# Patient Record
Sex: MALE | Race: WHITE | NOT HISPANIC OR LATINO | Employment: OTHER | URBAN - METROPOLITAN AREA
[De-identification: names, ages, dates, MRNs, and addresses within clinical notes are randomized per-mention and may not be internally consistent; named-entity substitution may affect disease eponyms.]

---

## 2020-09-16 ENCOUNTER — OFFICE VISIT CONVERTED (OUTPATIENT)
Dept: NEUROLOGY | Facility: CLINIC | Age: 70
End: 2020-09-16
Attending: PSYCHIATRY & NEUROLOGY

## 2021-05-10 NOTE — H&P
History and Physical      Patient Name: CASSANDRA ARRINGTON   Patient ID: [id number]   Sex: Male   YOB: 1950    Referring Provider: Asa Marsh MD    Visit Date: September 16, 2020    Provider: Remy Barth MD   Location: [facility]   Location Address: [address] [place] [address]  [id number]   Location Phone: [phone]          Chief Complaint     New pt here for Parkinson's disease.       History Of Present Illness  CASSANDRA ARRINGTON is a 70 year old male who presents today to [facility] today referred from Asa Marsh MD.      70-year-old man evaluated for Parkinson's disease but [place].  He states that a neurologist at that time gave him Neupro patch which given dyskinetic movements.  He states that he came to [place] about 6 years ago with his wife and he saw a neurologist in [place] back then who gave him carbidopa/levodopa and he tried it for a week at a time but it gave him dyskinetic movements.  He tried it a couple of times more and he had side effects and he refuses to take it at this time.  He has also been taking pramipexole 0.5 mg and he supposed to take it 3 times a day but he is only taking it once a day.  He is also taking amantadine 100 mg twice a day which he only takes once a day.  His wife thinks that he has memory problems however he takes care of his own medications.    His wife helps him with all activities of daily living because he has had 2 hip replacement and had 3 spine surgeries.  He needs assistance for bathing, dressing.  He needs at times assistance getting up from a sitting to standing position.  He was using a cane before 1-1/2 to 2 years ago.  Since that time is been using a walker.  He has fallen down a few times sideways and backwards.  He states that 6 years ago he was able to walk 1 mile a day.    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  He has not driven for 2 years.  He has problems with sleep.  His wife states that he goes into his study and reads in the evenings.  He drinks coffee in the morning and Pepsi during the daytime.  He does not watch his caffeine intake.  He takes melatonin at night.  He wants to know if there is any other stronger medications that he can take.    He has no history of REM behavior disorder.  He does not give any symptoms suggestive of REM behavior disorder at this time as well.  His wife states that he has significant amount of drooling.       Medication List  amantadine HCl 100 mg oral capsule; aspirin 81 mg oral tablet,delayed release (DR/EC); atorvastatin 80 mg oral tablet; buspirone 5 mg oral tablet; Bystolic 5 mg oral tablet; ciprofloxacin 500 mg/5 mL oral suspension,microcapsule recon; clopidogrel 75 mg oral tablet; diltiazem HCl 360 mg oral capsule,extended release 24 hr; furosemide 20 mg oral tablet; glycopyrrol 2 mg oral; isosorbide mononitrate 30 mg oral tablet extended release 24 hr; nitroglycerin 0.4 mg sublingual tablet, sublingual; pramipexole 0.5 mg oral tablet; tamsulosin 0.4 mg oral capsule; tramadol 50 mg oral tablet         Allergy List  Codiene         Social History  Alcohol (Never); Tobacco (Never)         Review of Systems  · Constitutional  o Admits  o : fatigue, weight loss  o Denies  o : chills, excessive sweating, fever, sycope/passing out, weight gain  · Eyes  o Admits  o : blurry vision  o Denies  o : changes in vision, double vision  · HENT  o Admits  o : nasal congestion  o Denies  o : loss of hearing, ringing in the ears, ear aches, sore throat, sinus pain, nose bleeds, seasonal allergies  · Cardiovascular  o Admits  o : easy burising or bleeding  o Denies  o : blood clots, swollen legs, anemia, transfusions  · Respiratory  o Admits  o : shortness of breath  o Denies  o : dry cough, productive cough, pneumonia, COPD  · Gastrointestinal  o Admits  o : difficulty  "swallowing  o Denies  o : reflux  · Genitourinary  o Denies  o : incontinence, erectile dysfunction  · Neurologic  o Admits  o : loss of balance, falls, dizziness/vertigo, difficulty with sleep, difficulty with coordination  o Denies  o : headache, seizure, stroke, tremor, numbness/tingling/paresthesia , difficulty with dexterity, weakness  · Musculoskeletal  o Admits  o : weakness, low back pain  o Denies  o : neck stiffness/pain, swollen lymph nodes, muscle aches, joint pain, spasms, sciatica, pain radiating in arm, pain radiating in leg  · Endocrine  o Admits  o : diabetes  o Denies  o : thyroid disorder  · Psychiatric  o Denies  o : anxiety, depression      Vitals  Date Time BP Position Site L\R Cuff Size HR RR TEMP (F) WT  HT  BMI kg/m2 BSA m2 O2 Sat HC       09/16/2020 03:08 PM        97.6         09/16/2020 03:32 /45 Sitting    75 - R   200lbs 0oz 5'  7\" 31.32 2.07           Physical Examination     He is alert, fluent, phasic, follows commands well.  His Mini-Mental status score is 26 out of 30.  He missed 3 out of 3 recent memory and he missed the date.  Clock drawing is intact.  He can tell me the president [place] at the previous president.  Optic disks are normal bilaterally, visual fields are full, EOMs are limited on upward gaze.  He cannot look upwards.  Facial strength is full, soft palate elevation and tongue are normal.  There is a mild tremor noted in the left hand at rest.  There is no significant rigidity or cogwheeling.  There is no weakness individual muscle testing.  Reflexes are absent.  Station and gait he has difficulty getting up from a chair and needed to be pushed up with the help of his wife.  He has start hesitation.  He takes slow and small shuffling steps.  Once going is able to ambulate but he has freezing when he stops.  Turning is significantly difficult for him with small steps.  Heart is regular in rhythm normal in rate.           Assessment  · Dementia associated " with Parkinson's disease       Parkinson's disease     331.82/G20  Dementia in other diseases classified elsewhere without behavioral disturbance     331.82/F02.80  I discussed with his wife that he needs assistance for his medications and his wife should be the one to make sure that he is taking his medications properly. In regards to the sleep problems I told the wife that he cannot take caffeine after 12 noon. He should not take caffeinated drinks such as Pepsi. Once he is ready to sleep he should just get ready and not stimulate himself and then go to bed at a designated time and keep that schedule.  · Parkinson's Disease     332.0/G20  I discussed with him and his wife the difference between Parkinson's disease and Parkinson syndrome. It is possible he has supranuclear palsy. I also told him that we cannot tell the difference at this time since is not taking any medications. I told him that pramipexole is 0.5 mg is ineffective and so is amounted in the 100 mg daily. He refuses to take carbidopa/levodopa. I will increase pramipexole to take 0.5 mg 3 times a day at 6 AM, 12 noon and 6 PM for 1 week and then 1 mg 3 times a day the second week if there is no improvement of symptoms and then 1.5 mg 3 times a day the third week and on. I told him that side effects of higher doses of pramipexole includes confusion, hallucination, dyskinetic movements. They are to call our office for any problems with the medications. He is to take amantadine 100 mg twice a day. I told him and his wife that amantadine can also cause hallucinations and confusion.    I will refer him to the [facility] movement disorder clinic to be evaluated for Duopa pump as well as possible DBS. I do not think he is a candidate for DBS however. I will see him again in 2 months time for follow-up.    60 minutes was spent for this high complexity encounter more than half the time was spent face-to-face with the patient for examination,  counseling, planning and recommendations.    Problems Reconciled  Plan  · Orders  o NEUROLOGY CONSULTATION. (NEURO) - 332.0/G20, 331.82/G20, 331.82/F02.80 - 09/17/2020   [facility] movement disorder clinic for duopa pump for DBS evaluation  · Medications  o Medications have been Reconciled  o Transition of Care or Provider Policy  · Instructions  o Encouraged to follow-up with Primary Care Provider for preventative care.            Electronically Signed by: Remy Barth MD -Author on September 17, 2020 08:22:34 AM

## 2021-05-12 ENCOUNTER — CONVERSION ENCOUNTER (OUTPATIENT)
Dept: CARDIOLOGY | Facility: CLINIC | Age: 71
End: 2021-05-12

## 2021-05-12 ENCOUNTER — OFFICE VISIT CONVERTED (OUTPATIENT)
Dept: CARDIOLOGY | Facility: CLINIC | Age: 71
End: 2021-05-12
Attending: INTERNAL MEDICINE

## 2021-05-14 VITALS
HEIGHT: 67 IN | SYSTOLIC BLOOD PRESSURE: 125 MMHG | HEART RATE: 75 BPM | DIASTOLIC BLOOD PRESSURE: 45 MMHG | BODY MASS INDEX: 31.39 KG/M2 | WEIGHT: 200 LBS | TEMPERATURE: 97.6 F

## 2021-05-23 ENCOUNTER — TRANSCRIBE ORDERS (OUTPATIENT)
Dept: CARDIOLOGY | Facility: CLINIC | Age: 71
End: 2021-05-23

## 2021-05-23 DIAGNOSIS — Z95.828 STATUS POST VASCULAR BYPASS: Primary | ICD-10-CM

## 2021-06-05 NOTE — H&P
History and Physical      Patient Name: Imtiaz Cabezas   Patient ID: [id number]   Sex: Male   YOB: 1950    Referring Provider: José Luis SPANN    Visit Date: May 12, 2021    Provider: Simone Ross MD   Location: [facility]   Location Address: [address] [place] [address], [address]   [place]  [id number]   Location Phone: [phone]          Chief Complaint     Shortness of breath, evaluate his coronary artery disease, and establish cardiology.       History Of Present Illness  Consult requested by: José Luis SPANN   Imtiaz Cabezas is a 70 year old male who has seen a cardiologist in [place] long term but as he gets older he wants one closer to home, he does not want to be driving back and forth to [place] so he comes here to establish a cardiologist. He complains of shortness of breath, particularly with activities, mild with moderate exertion and also when he gets excited. That is long term and attributed to his COPD. He has some swelling which is mild and long term for him. He denies any chest pain, no palpitations. He has some dizziness that he attributes to his Parkinson's but no syncope, PND, or orthopnea.   PAST MEDICAL HISTORY: Positive for diabetes, hypertension, prostate cancer, sleep apnea, congestive heart failure, arthritis, previous MI and circulation issues. Hospitalizations/surgeries include a bypass x3 in January 2012 at [place] (LIMA to the left anterior descending, SVG to right coronary artery, SVG to OM), a stent to the right coronary artery in January 2016. He had ICD implant for V-tach he says was last checked about a week ago.   FAMILY HISTORY: Positive for hypertension and heart disease. Negative for diabetes. His mom had rheumatic fever and had mitral valve issues from a young age.   CURRENT MEDICATIONS: Based on list and some bottles. Amantadine 100 mg twice daily; Nitroglycerin 0.4 mg as needed; Hydroxyzine HCL 10 mg as needed; Mirtazapine 30 mg daily as  "needed; Tramadol HCL 50 mg three times daily as needed; Sinemet twice daily; Flomax 0.4 mg 2 daily; Bystolic 5 mg daily; Glycopyrrolate 2 mg 1/2 daily as needed; Pramipexole 0.25 mg twice daily; Buspirone 5 mg three times daily; Furosemide 20 mg daily; Vitamin D3 125 mcg daily; Diltiazem 24-HR  mg; Clopidogrel 75 mg daily. He admits that he has stopped taking his atorvastatin for no particular reason and he has also stopped taking his aspirin.   CHOLESTEROL STATUS: Uncertain.   PSYCHOSOCIAL HISTORY: Denies alcohol or tobacco use.   ALLERGIES: Codeine.       Review of Systems  · Constitutional  o Admits  o : fatigue, good general health lately, recent weight changes   · Eyes  o Denies  o : double vision  · HENT  o Admits  o : chronic sinus problem  o Denies  o : hearing loss or ringing, swollen glands in neck  · Cardiovascular  o Admits  o : swelling (feet, ankles, hands), shortness of breath while walking or lying flat  o Denies  o : chest pain, palpitations (fast, fluttering, or skipping beats)  · Respiratory  o Denies  o : shortness of breath, asthma or wheezing, COPD  · Gastrointestinal  o Denies  o : ulcers, nausea or vomiting  · Neurologic  o Admits  o : lightheaded or dizzy, stroke  o Denies  o : headaches  · Musculoskeletal  o Admits  o : joint pain, back pain  · Endocrine  o Admits  o : diabetes  o Denies  o : thyroid disease, heat or cold intolerance, excessive thirst or urination  · Heme-Lymph  o Admits  o : bleeding or bruising tendency  o Denies  o : anemia      Vitals  Date Time BP Position Site L\R Cuff Size HR RR TEMP (F) WT  HT  BMI kg/m2 BSA m2 O2 Sat FR L/min FiO2        05/12/2021 02:13 /64 Sitting    76 - R   194lbs 16oz 5'  7\" 30.54 2.04             Physical Examination  · Constitutional  o Appearance  o : Awake, alert, in no acute distress. He ambulates with a rolling walker. He is accompanied by his wife.   · Eyes  o Conjunctivae  o : Conjunctivae normal.  o Pupils and " Irises  o : Pupils equal and reactive to light.  · Ears, Nose, Mouth and Throat  o Oral Cavity  o :   § Oral Mucosa  § : Normal oral mucosa.  · Neck  o Inspection/Palpation/Auscultation  o : No lymphadenopathy. No JVD. No bruit. Good carotid upstroke.  · Respiratory  o Respiratory  o : Clear to percussion and auscultation. Good respiratory effort.  · Cardiovascular  o Heart  o : PMI is slightly displaced. Heart sounds are distant. S1 and S2 are regular. No S3 and no S4. He has a faint murmur at the base, negative diastolic murmur.   o Peripheral Vascular System  o :   § Femoral Arteries  § : Good femoral pulses.  § Pedal Pulses  § : Good pedal pulses. No pedal edema.  · Gastrointestinal  o Abdominal Examination  o : Soft. No masses or tenderness noted. No hepatosplenomegaly. Abdominal aorta not palpable.  · Musculoskeletal  o General  o : Muscle strength is normal with normal tone.  · Skin and Subcutaneous Tissue  o General Inspection  o : Within normal limits.  o Digits and Nails  o : Nails are normal.  · EKG  o EKG  o : Was performed in the office today  o Indications  o : Coronary artery disease.  o Results  o : Sinus rhythm wtih a rate of 75 with a left bundle branch block.  o Comparison  o : No other EKGs to compare with.  · Labs  o Labs  o : Cholesterol taken recently by primary care provider showed total cholesterol 186, triglycerides 115, HDL is 52, LDL is at 113.           Assessment     1.  Shortness of breath is stable.  2.  Coronary artery disease, previous bypass, stents, and MI without angina.  3.  Hyperlipidemia, needs tighter control, would like LDL below 70.  4.  Hypertension, controlled.   5.  Presence of an ICD device.   6.  Diabetes mellitus, insulin dependent without an ACE or ARB.          Plan     1. He has issues with some swelling at times as well as constipation. We are going to stop his diltiazem.  2. Increase Bystolic to 10 mg a day for rate control and his blood pressure.  3.  Start  losartan 25 mg half a tab in the morning for 6 days and then 1 tab for both secondary prevention with diabetes as well as for hypertension.  4.  Start aspirin 81 every other day because he complains of bruising at times.   5.  Start atorvastatin 40 mg once a day for his hyperlipidemia.   6.  Will followup in 4 months with an echocardiogram. Will check lipids and CMP at that time.   7.  Will check a BMP in 1 month since starting the losartan.   8.  We will try to get his ICD records and start checking it here and moving his remove readings down to us.   9.  We discussed the risks versus benefits of COVID vaccine and I strongly encouraged him to get it down.         AGUSTO Thakkar/iSmone Ross MD, [facility]  JF:PM:vh             Electronically Signed by: Amina Sanchez-, OT -Author on May 15, 2021 07:39:48 AM  Electronically Co-signed by: AGUSTO Ackerman -Reviewer on May 17, 2021 07:32:21 AM  Electronically Co-signed by: Simone Ross MD -Reviewer on May 22, 2021 04:46:03 PM

## 2021-06-16 ENCOUNTER — TELEPHONE (OUTPATIENT)
Dept: CARDIOLOGY | Facility: CLINIC | Age: 71
End: 2021-06-16

## 2021-06-16 NOTE — TELEPHONE ENCOUNTER
I accepted patients home remote follow up and I received an alert that his Right Ventricular lead has noise on it and lead impedence is 2725 ohms.  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

## 2021-07-15 VITALS
HEART RATE: 76 BPM | SYSTOLIC BLOOD PRESSURE: 134 MMHG | WEIGHT: 195 LBS | DIASTOLIC BLOOD PRESSURE: 64 MMHG | BODY MASS INDEX: 30.61 KG/M2 | HEIGHT: 67 IN

## 2021-09-03 PROBLEM — I25.810 CORONARY ARTERY DISEASE INVOLVING CORONARY BYPASS GRAFT OF NATIVE HEART WITHOUT ANGINA PECTORIS: Status: ACTIVE | Noted: 2021-09-03

## 2021-09-03 PROBLEM — Z95.810 PRESENCE OF COMBINATION INTERNAL CARDIAC DEFIBRILLATOR (ICD) AND PACEMAKER: Chronic | Status: ACTIVE | Noted: 2021-09-03

## 2021-09-03 PROBLEM — E78.5 HYPERLIPIDEMIA: Chronic | Status: ACTIVE | Noted: 2021-09-03

## 2021-09-03 PROBLEM — E78.5 HYPERLIPIDEMIA: Status: ACTIVE | Noted: 2021-09-03

## 2021-09-03 PROBLEM — I10 HIGH BLOOD PRESSURE: Chronic | Status: ACTIVE | Noted: 2021-09-03

## 2021-09-03 PROBLEM — I10 HIGH BLOOD PRESSURE: Status: ACTIVE | Noted: 2021-09-03

## 2021-09-08 ENCOUNTER — CLINICAL SUPPORT NO REQUIREMENTS (OUTPATIENT)
Dept: CARDIOLOGY | Facility: CLINIC | Age: 71
End: 2021-09-08

## 2021-09-08 ENCOUNTER — OFFICE VISIT (OUTPATIENT)
Dept: CARDIOLOGY | Facility: CLINIC | Age: 71
End: 2021-09-08

## 2021-09-08 VITALS
SYSTOLIC BLOOD PRESSURE: 146 MMHG | DIASTOLIC BLOOD PRESSURE: 64 MMHG | HEART RATE: 76 BPM | WEIGHT: 190 LBS | HEIGHT: 67 IN | BODY MASS INDEX: 29.82 KG/M2

## 2021-09-08 DIAGNOSIS — I25.810 CORONARY ARTERY DISEASE INVOLVING CORONARY BYPASS GRAFT OF NATIVE HEART WITHOUT ANGINA PECTORIS: Chronic | ICD-10-CM

## 2021-09-08 DIAGNOSIS — I10 ESSENTIAL HYPERTENSION: Chronic | ICD-10-CM

## 2021-09-08 DIAGNOSIS — I50.42 CHRONIC COMBINED SYSTOLIC AND DIASTOLIC CONGESTIVE HEART FAILURE (HCC): ICD-10-CM

## 2021-09-08 DIAGNOSIS — I25.810 CORONARY ARTERY DISEASE INVOLVING CORONARY BYPASS GRAFT OF NATIVE HEART WITHOUT ANGINA PECTORIS: Primary | Chronic | ICD-10-CM

## 2021-09-08 DIAGNOSIS — Z95.810 PRESENCE OF COMBINATION INTERNAL CARDIAC DEFIBRILLATOR (ICD) AND PACEMAKER: Chronic | ICD-10-CM

## 2021-09-08 DIAGNOSIS — E78.5 HYPERLIPIDEMIA, UNSPECIFIED HYPERLIPIDEMIA TYPE: Chronic | ICD-10-CM

## 2021-09-08 DIAGNOSIS — E78.5 HYPERLIPIDEMIA, UNSPECIFIED HYPERLIPIDEMIA TYPE: ICD-10-CM

## 2021-09-08 DIAGNOSIS — Z95.810 PRESENCE OF COMBINATION INTERNAL CARDIAC DEFIBRILLATOR (ICD) AND PACEMAKER: Primary | ICD-10-CM

## 2021-09-08 PROCEDURE — 93283 PRGRMG EVAL IMPLANTABLE DFB: CPT | Performed by: INTERNAL MEDICINE

## 2021-09-08 PROCEDURE — 99214 OFFICE O/P EST MOD 30 MIN: CPT | Performed by: INTERNAL MEDICINE

## 2021-09-08 NOTE — PROGRESS NOTES
Chief Complaint  Hypertension and Follow-up    Subjective            Imtiaz Cabezas presents to [facility]  Mr. Cabezas is a 71 years old gentleman with coronary disease previous bypass surgery hypertension hyperlipidemia who is doing better. He denies any definite chest pain palpitations dizziness syncope. His device check reveals that his RV lead is not working with increasing impedance. He did not bring his medication bottles and does not know his medications      Past Medical History:   Diagnosis Date   • Arthritis    • Coronary artery disease involving coronary bypass graft of native heart without angina pectoris 1/1/2016     bypass x3 in January 2012 at [place] (LIMA to the left anterior descending, SVG to right coronary artery, SVG to OM), a stent to the right coronary artery in January 2016   • Diabetes (CMS/[facility])    • Heart attack (CMS/[facility])    • High blood pressure    • Parkinson disease (CMS/[facility])        Allergies   Allergen Reactions   • Codeine Anaphylaxis        Past Surgical History:   Procedure Laterality Date   • ARTERIAL BYPASS SURGERY     • BACK SURGERY     • CARDIAC SURGERY     • CAROTID STENT     • HIP SURGERY     • INSERT / REPLACE / REMOVE PACEMAKER     • PACEMAKER IMPLANTATION          Social History     Tobacco Use   • Smoking status: Never Smoker   • Smokeless tobacco: Never Used   Vaping Use   • Vaping Use: Never used   Substance Use Topics   • Alcohol use: Never   • Drug use: Never       Family History   Problem Relation Age of Onset   • Parkinsonism Other    • Diabetes Other    • Other Mother         mitral valve replacement        Prior to Admission medications    Not on File        Review of Systems   Constitutional: Positive for fatigue.   Respiratory: Negative for cough and shortness of breath.    Cardiovascular: Negative for chest pain, palpitations and leg swelling.   Neurological: Positive for dizziness.        Objective     /64   Pulse 76   Ht  "170 cm (66.93\")   Wt 86.2 kg (190 lb)   BMI 29.82 kg/m²       Physical Exam  Constitutional:       General: He is awake.      Appearance: Normal appearance.   Neck:      Thyroid: No thyromegaly.      Vascular: No carotid bruit or JVD.   Cardiovascular:      Rate and Rhythm: Normal rate and regular rhythm.      Chest Wall: PMI is not displaced.      Pulses: Normal pulses.      Heart sounds: S1 normal and S2 normal. Murmur heard.   Systolic murmur is present.   No friction rub. No gallop. No S3 or S4 sounds.    Pulmonary:      Effort: Pulmonary effort is normal.      Breath sounds: Normal breath sounds and air entry. No wheezing, rhonchi or rales.   Abdominal:      General: Bowel sounds are normal.      Palpations: Abdomen is soft. There is no mass.      Tenderness: There is no abdominal tenderness.   Musculoskeletal:      Cervical back: Neck supple.      Right lower leg: No edema.      Left lower leg: No edema.   Neurological:      Mental Status: He is alert and oriented to person, place, and time.   Psychiatric:         Mood and Affect: Mood normal.         Behavior: Behavior is cooperative.           Result Review :                      Outside lab including CBC chemistry lipid panel was reviewed     Assessment and Plan        Diagnoses and all orders for this visit:    1. Coronary artery disease involving coronary bypass graft of native heart without angina pectoris (Primary)  Assessment & Plan:  Coronary artery disease is terrible has not had any angina pectoris. He had bypass x3 in January 2012 at [facility] with LIMA to the LAD, SVG to her right coronary artery and SVG to obtuse marginal branch and a subsequent stent to the right coronary artery in January 2016. He had a ICD placed in February or March of this year for an episode of V. tach in [place]    Orders:  -     Lipid Panel; Future  -     Comprehensive Metabolic Panel; Future  -     Magnesium; Future  -     TSH; Future  -     proBNP; " Future    2. Hyperlipidemia, unspecified hyperlipidemia type  Assessment & Plan:  Lipid abnormalities are at goal.  HDL 59 LDL 52 triglycerides 67    Orders:  -     Lipid Panel; Future  -     Comprehensive Metabolic Panel; Future  -     Magnesium; Future  -     TSH; Future  -     proBNP; Future    3. Essential hypertension  -     Lipid Panel; Future  -     Comprehensive Metabolic Panel; Future  -     Magnesium; Future  -     TSH; Future  -     proBNP; Future    4. Presence of combination internal cardiac defibrillator (ICD) and pacemaker  Assessment & Plan:  Patient's ICD is functioning okay.  His RV lead impedance is gone up.  He has not had any ventricular tachycardia episodes.    Approximate battery life is 5 to 6 years.  Patient is being recommended to visit his electrophysiology in [place] again to see if the RV lead needs to be repositioned or changed    Orders:  -     Lipid Panel; Future  -     Comprehensive Metabolic Panel; Future  -     Magnesium; Future  -     TSH; Future  -     proBNP; Future    5. Chronic combined systolic and diastolic congestive heart failure (CMS/HCC)  -     Lipid Panel; Future  -     Comprehensive Metabolic Panel; Future  -     Magnesium; Future  -     TSH; Future  -     proBNP; Future    He has been recommended to continue his Bystolic 10 mg a day losartan 25 mg a day and atorvastatin 40 mg at. He will do a 2-week blood pressure log since he checks his pressures at home at least 2-3 times a day and states that they are much better controlled than what we found today. He will bring it to us in 2 to 3 weeks and also do a 2-week blood pressure log prior to his appointment in 4 to 5 months. I already contacted Dr. Thompson  about his RV lead issues. He will see him in the office in the next week or 2      Follow Up     Return in about 6 months (around 3/8/2022) for Device check, Labs with outside lab ordered.    Patient was given instructions and counseling regarding his condition or  for health maintenance advice. Please see specific information pulled into the AVS if appropriate.

## 2021-09-08 NOTE — ASSESSMENT & PLAN NOTE
Patient's ICD is functioning okay.  His RV lead impedance is gone up.  He has not had any ventricular tachycardia episodes.    Approximate battery life is 5 to 6 years.  Patient is being recommended to visit his electrophysiology in [place] again to see if the RV lead needs to be repositioned or changed

## 2021-09-08 NOTE — ASSESSMENT & PLAN NOTE
Coronary artery disease is stable.  He has not had any angina pectoris. He had bypass x3 in January 2012 at [facility], [place] with LIMA to the LAD, SVG to her right coronary artery and SVG to obtuse marginal branch and a subsequent stent to the right coronary artery in January 2016. He had a ICD placed in February or March of this year for an episode of V. tach in [place]

## 2021-09-08 NOTE — PROGRESS NOTES
Patient has a dual chamber ICD device that was Implanted by Dr. Thompson.  The Right Ventricular lead is showing signs of insulation fracture due to noise, a chest xray was performed and there were no visual sighting of insulation fracture.  His device was programmed to extend detection of sensing the noise and he is on home remote follow up.

## 2021-09-09 ENCOUNTER — TELEPHONE (OUTPATIENT)
Dept: CARDIOLOGY | Facility: CLINIC | Age: 71
End: 2021-09-09

## 2021-10-04 ENCOUNTER — TELEPHONE (OUTPATIENT)
Dept: CARDIOLOGY | Facility: CLINIC | Age: 71
End: 2021-10-04

## 2021-10-05 NOTE — TELEPHONE ENCOUNTER
Please restart losartan 25 mg a day.  This is not just for blood pressure but also to help protect the kidneys in view of his diabetes.  We had tried to stop his diltiazem because of his complaints of swelling and constipation.  If he is not have any issues with this he can continue this med until his appointment.  Do another blood pressure log please

## 2021-10-05 NOTE — TELEPHONE ENCOUNTER
meds are as follows    Lasix 20mg q am  Isosorbide 30mg q am  Plavix 75mg q am  Pramipexole  Bid  Hydroxyzine 10mg PRN    Diltiazem Er 360mg q evening (didn't stop as directed at [facility])  Bystolic 10mg q evening  Flomax bid  Atorvastatin 40mg qhs  Asa 81mg when he remembers  Amantadine 100mg qd    Not taking losartan.  He took one does and it didn't help his BP so he stopped it.

## 2021-10-05 NOTE — TELEPHONE ENCOUNTER
Blood pressures appear to be high frequently.  Can you please clarify his medications when he takes them?

## 2022-08-24 ENCOUNTER — TELEPHONE (OUTPATIENT)
Dept: NEUROLOGY | Facility: OTHER | Age: 72
End: 2022-08-24

## 2022-08-24 NOTE — TELEPHONE ENCOUNTER
PT'S WIFE CALLED TO SEE ABOUT TRANSFERRING CARE FOR HER  FOR PARKINSON'S AS THEY HAVE RECENTLY MOVED.    GAVE HER OFFICE PHONE AND THE HUB FAX.    PT'S WIFE PHONE [phone].    GAVE HER [employer] PHONE NUMBER ALSO AS PT DOESN'T HAVE INSURANCE.

## 2023-02-10 ENCOUNTER — OFFICE VISIT (OUTPATIENT)
Dept: NEUROSURGERY | Facility: CLINIC | Age: 73
End: 2023-02-10

## 2023-02-10 VITALS — WEIGHT: 172 LBS | HEIGHT: 67 IN | BODY MASS INDEX: 27 KG/M2 | TEMPERATURE: 97.1 F

## 2023-02-10 DIAGNOSIS — R26.9 ABNORMALITY OF GAIT: ICD-10-CM

## 2023-02-10 DIAGNOSIS — M48.062 SPINAL STENOSIS, LUMBAR REGION, WITH NEUROGENIC CLAUDICATION: Primary | ICD-10-CM

## 2023-02-10 DIAGNOSIS — G20 PARKINSON DISEASE: ICD-10-CM

## 2023-02-10 DIAGNOSIS — M47.816 LUMBAR SPONDYLOSIS: ICD-10-CM

## 2023-02-10 DIAGNOSIS — M51.37 DEGENERATION OF LUMBAR OR LUMBOSACRAL INTERVERTEBRAL DISC: ICD-10-CM

## 2023-02-10 DIAGNOSIS — Z00.6 EXAMINATION FOR NORMAL COMPARISON FOR CLINICAL RESEARCH: Primary | ICD-10-CM

## 2023-02-10 PROCEDURE — 99204 OFFICE O/P NEW MOD 45 MIN: CPT | Performed by: PHYSICIAN ASSISTANT

## 2023-02-10 NOTE — PROGRESS NOTES
Patient: Imtiaz Cabezas  : 1950  Chart #: [id number]    Date of Service: 02/10/2023    CHIEF COMPLAINT: Back pain with walking difficulties    History of Present Illness Mr. Cabezas is a 72 year old gentleman with a PMHx significant for Parkinson Disease, DMII, CAD (s/p bypass x3), ICD and pacemaker placed, prostate CA. He underwent decompressive laminectomies at L2-3, L3-4, and L4-5 in .  He presents today with complaints of back pain as well as unsteady gait.  Symptoms have been present and progressing over the past several years.  In part his gait is unsteady due to his Parkinson's disease.  His back bothers him all the time regardless of position but it does seem to be worse when standing.  He has a motorized wheelchair that he is in today but he likes to use a rolling walker when he is at home.  He has some pain into the right thigh.  He has not had formal therapy in some time.  He used to do water therapy.  He denies focal weakness.  He is fearful of falling backwards when walking.  Again he has had no recent therapies for his back.      Past Medical History:   Diagnosis Date   • Arthritis    • Coronary artery disease involving coronary bypass graft of native heart without angina pectoris 2016     bypass x3 in 2012 at [place] (LIMA to the left anterior descending, SVG to right coronary artery, SVG to OM), a stent to the right coronary artery in 2016   • Diabetes (CMS/[facility]ly [facility])    • Heart attack (CMS/Formerly [facility])    • High blood pressure    • Parkinson disease (CMS/Formerly [facility])        No current outpatient medications on file.    Past Surgical History:   Procedure Laterality Date   • ARTERIAL BYPASS SURGERY     • BACK SURGERY     • CARDIAC SURGERY     • CAROTID STENT     • HIP SURGERY     • INSERT / REPLACE / REMOVE PACEMAKER     • PACEMAKER IMPLANTATION         Social History     Socioeconomic History   • Marital status:    Tobacco Use   • Smoking status: Never   • Smokeless tobacco: Never  "  Vaping Use   • Vaping Use: Never used   Substance and Sexual Activity   • Alcohol use: Never   • Drug use: Never   • Sexual activity: Defer         Review of Systems    Objective   Vital Signs: Temperature 97.1 °F (36.2 °C), temperature source Infrared, height 170.2 cm (67\"), weight 78 kg (172 lb).  Physical Exam  Vitals and nursing note reviewed.   Constitutional:       General: He is not in acute distress.     Appearance: He is well-developed.   HENT:      Head: Normocephalic and atraumatic.   Eyes:      Pupils: Pupils are equal, round, and reactive to light.   Pulmonary:      Breath sounds: Normal breath sounds.   Psychiatric:         Behavior: Behavior normal.         Thought Content: Thought content normal.     Musculoskeletal:     Strength is intact in upper and lower extremities to direct testing.     Patient is able to stand and walk in the room with assistance.  His gait is shuffling     Straight leg raising is negative.  Edy sign negative  Neurologic:     Muscle tone is normal throughout.     Coordination is intact.     Deep tendon reflexes: 1+ and symmetrical.     Sensation is intact to light touch throughout.     Patient is oriented to person, place, and time.       Independent review of radiographic imaging: Patient has a CT scan of the lumbar spine for my review today.  This demonstrates multilevel lumbar spondylosis and disc narrowing    Assessment & Plan   Diagnosis:  1.  Chronic back pain with probable lumbar stenosis  2.  Parkinson's to disease  3.  Gait difficulty    Medical Decision Making: Mr. Cabezas is a very pleasant 72-year-old gentleman who has a chronic history of back difficulties.  He had multilevel lumbar stenosis status post laminectomies many years ago.  He has worked hard on his life and has a lot of wear and tear on his spine, and has always had some degree of pain. He weaned himself from tramadol recently because he felt he was forming a habit of being on it. He is on Plavix " and is limited in terms of medicines he can take for arthritic pain.  He also has Parkinson's disease.  A combination of the above limits his walking.  He has a complicated medical history that makes him a poor surgical candidate.  He cannot have an MRI due to pacemaker status.  Given he has not tried any recent therapies and the fact that I would not be eager to recommend a surgery given the aforementioned complicated medical history, I would like to see him do some formal physical therapy and maybe even water therapy for a while.  If pain becomes more of an issue we might consider a referral to pain management for injections.  I will see him back after he has done a few months of physical therapy and further recommendations will be made at that time.    Diagnoses and all orders for this visit:    1. Spinal stenosis, lumbar region, with neurogenic claudication (Primary)    2. Lumbar spondylosis  -     Ambulatory Referral to Physical Therapy Evaluate and treat    3. Abnormality of gait    4. Parkinson disease (HCC)    5. Degeneration of lumbar or lumbosacral intervertebral disc                        Enma Olivarez PA-C  Patient Care Team:  Hadley Reyna APRN as PCP - General (Nurse Practitioner)

## 2024-05-08 ENCOUNTER — APPOINTMENT (OUTPATIENT)
Dept: CT IMAGING | Facility: HOSPITAL | Age: 74
End: 2024-05-08
Payer: MEDICAID

## 2024-05-08 ENCOUNTER — HOSPITAL ENCOUNTER (OUTPATIENT)
Facility: HOSPITAL | Age: 74
Setting detail: OBSERVATION
Discharge: NURSING, REHAB OR LONG-TERM CARE | End: 2024-05-10
Attending: STUDENT IN AN ORGANIZED HEALTH CARE EDUCATION/TRAINING PROGRAM | Admitting: STUDENT IN AN ORGANIZED HEALTH CARE EDUCATION/TRAINING PROGRAM
Payer: MEDICAID

## 2024-05-08 DIAGNOSIS — F02.C0 SEVERE DEMENTIA DUE TO PARKINSON'S DISEASE, WITHOUT BEHAVIORAL DISTURBANCE, PSYCHOTIC DISTURBANCE, MOOD DISTURBANCE, OR ANXIETY: ICD-10-CM

## 2024-05-08 DIAGNOSIS — R53.1 GENERALIZED WEAKNESS: ICD-10-CM

## 2024-05-08 DIAGNOSIS — G20.A1 MILD DEMENTIA DUE TO PARKINSON'S DISEASE, WITHOUT BEHAVIORAL DISTURBANCE, PSYCHOTIC DISTURBANCE, MOOD DISTURBANCE, OR ANXIETY: ICD-10-CM

## 2024-05-08 DIAGNOSIS — G93.41 METABOLIC ENCEPHALOPATHY: ICD-10-CM

## 2024-05-08 DIAGNOSIS — E16.0 HYPOGLYCEMIA DUE TO INSULIN: Primary | ICD-10-CM

## 2024-05-08 DIAGNOSIS — T38.3X5A HYPOGLYCEMIA DUE TO INSULIN: Primary | ICD-10-CM

## 2024-05-08 DIAGNOSIS — G20.A1 SEVERE DEMENTIA DUE TO PARKINSON'S DISEASE, WITHOUT BEHAVIORAL DISTURBANCE, PSYCHOTIC DISTURBANCE, MOOD DISTURBANCE, OR ANXIETY: ICD-10-CM

## 2024-05-08 DIAGNOSIS — F02.A0 MILD DEMENTIA DUE TO PARKINSON'S DISEASE, WITHOUT BEHAVIORAL DISTURBANCE, PSYCHOTIC DISTURBANCE, MOOD DISTURBANCE, OR ANXIETY: ICD-10-CM

## 2024-05-08 LAB
BASOPHILS # BLD AUTO: 0.07 10*3/MM3 (ref 0–0.2)
BASOPHILS NFR BLD AUTO: 1.2 % (ref 0–1.5)
DEPRECATED RDW RBC AUTO: 41.5 FL (ref 37–54)
EOSINOPHIL # BLD AUTO: 0.31 10*3/MM3 (ref 0–0.4)
EOSINOPHIL NFR BLD AUTO: 5.3 % (ref 0.3–6.2)
ERYTHROCYTE [DISTWIDTH] IN BLOOD BY AUTOMATED COUNT: 12.7 % (ref 12.3–15.4)
GLUCOSE BLDC GLUCOMTR-MCNC: 41 MG/DL (ref 70–130)
HCT VFR BLD AUTO: 38.4 % (ref 37.5–51)
HGB BLD-MCNC: 13.4 G/DL (ref 13–17.7)
HOLD SPECIMEN: NORMAL
HOLD SPECIMEN: NORMAL
IMM GRANULOCYTES # BLD AUTO: 0.01 10*3/MM3 (ref 0–0.05)
IMM GRANULOCYTES NFR BLD AUTO: 0.2 % (ref 0–0.5)
LYMPHOCYTES # BLD AUTO: 1.67 10*3/MM3 (ref 0.7–3.1)
LYMPHOCYTES NFR BLD AUTO: 28.4 % (ref 19.6–45.3)
MCH RBC QN AUTO: 31.2 PG (ref 26.6–33)
MCHC RBC AUTO-ENTMCNC: 34.9 G/DL (ref 31.5–35.7)
MCV RBC AUTO: 89.5 FL (ref 79–97)
MONOCYTES # BLD AUTO: 0.67 10*3/MM3 (ref 0.1–0.9)
MONOCYTES NFR BLD AUTO: 11.4 % (ref 5–12)
NEUTROPHILS NFR BLD AUTO: 3.16 10*3/MM3 (ref 1.7–7)
NEUTROPHILS NFR BLD AUTO: 53.5 % (ref 42.7–76)
NRBC BLD AUTO-RTO: 0 /100 WBC (ref 0–0.2)
PLATELET # BLD AUTO: 217 10*3/MM3 (ref 140–450)
PMV BLD AUTO: 9.7 FL (ref 6–12)
RBC # BLD AUTO: 4.29 10*6/MM3 (ref 4.14–5.8)
WBC NRBC COR # BLD AUTO: 5.89 10*3/MM3 (ref 3.4–10.8)
WHOLE BLOOD HOLD COAG: NORMAL
WHOLE BLOOD HOLD SPECIMEN: NORMAL

## 2024-05-08 PROCEDURE — 99285 EMERGENCY DEPT VISIT HI MDM: CPT

## 2024-05-08 PROCEDURE — 85730 THROMBOPLASTIN TIME PARTIAL: CPT | Performed by: STUDENT IN AN ORGANIZED HEALTH CARE EDUCATION/TRAINING PROGRAM

## 2024-05-08 PROCEDURE — 93005 ELECTROCARDIOGRAM TRACING: CPT | Performed by: STUDENT IN AN ORGANIZED HEALTH CARE EDUCATION/TRAINING PROGRAM

## 2024-05-08 PROCEDURE — 0042T HC CT CEREBRAL PERFUSION W/WO CONTRAST: CPT

## 2024-05-08 PROCEDURE — 70496 CT ANGIOGRAPHY HEAD: CPT

## 2024-05-08 PROCEDURE — 70498 CT ANGIOGRAPHY NECK: CPT

## 2024-05-08 PROCEDURE — 99291 CRITICAL CARE FIRST HOUR: CPT

## 2024-05-08 PROCEDURE — 85025 COMPLETE CBC W/AUTO DIFF WBC: CPT | Performed by: STUDENT IN AN ORGANIZED HEALTH CARE EDUCATION/TRAINING PROGRAM

## 2024-05-08 PROCEDURE — 96376 TX/PRO/DX INJ SAME DRUG ADON: CPT

## 2024-05-08 PROCEDURE — 80053 COMPREHEN METABOLIC PANEL: CPT | Performed by: STUDENT IN AN ORGANIZED HEALTH CARE EDUCATION/TRAINING PROGRAM

## 2024-05-08 PROCEDURE — G0378 HOSPITAL OBSERVATION PER HR: HCPCS

## 2024-05-08 PROCEDURE — 70450 CT HEAD/BRAIN W/O DYE: CPT

## 2024-05-08 PROCEDURE — 82948 REAGENT STRIP/BLOOD GLUCOSE: CPT | Performed by: STUDENT IN AN ORGANIZED HEALTH CARE EDUCATION/TRAINING PROGRAM

## 2024-05-08 PROCEDURE — 85610 PROTHROMBIN TIME: CPT | Performed by: STUDENT IN AN ORGANIZED HEALTH CARE EDUCATION/TRAINING PROGRAM

## 2024-05-08 PROCEDURE — 84484 ASSAY OF TROPONIN QUANT: CPT | Performed by: STUDENT IN AN ORGANIZED HEALTH CARE EDUCATION/TRAINING PROGRAM

## 2024-05-08 RX ORDER — DEXTROSE MONOHYDRATE 25 G/50ML
50 INJECTION, SOLUTION INTRAVENOUS
Status: DISCONTINUED | OUTPATIENT
Start: 2024-05-08 | End: 2024-05-10 | Stop reason: HOSPADM

## 2024-05-08 RX ORDER — NALOXONE HYDROCHLORIDE 1 MG/ML
2 INJECTION INTRAMUSCULAR; INTRAVENOUS; SUBCUTANEOUS ONCE
Status: DISCONTINUED | OUTPATIENT
Start: 2024-05-08 | End: 2024-05-10 | Stop reason: HOSPADM

## 2024-05-08 RX ORDER — SODIUM CHLORIDE 0.9 % (FLUSH) 0.9 %
10 SYRINGE (ML) INJECTION AS NEEDED
Status: DISCONTINUED | OUTPATIENT
Start: 2024-05-08 | End: 2024-05-10 | Stop reason: HOSPADM

## 2024-05-08 RX ORDER — DEXTROSE MONOHYDRATE 25 G/50ML
INJECTION, SOLUTION INTRAVENOUS
Status: COMPLETED
Start: 2024-05-08 | End: 2024-05-08

## 2024-05-08 RX ADMIN — DEXTROSE MONOHYDRATE 50 ML: 25 INJECTION, SOLUTION INTRAVENOUS at 23:40

## 2024-05-09 ENCOUNTER — APPOINTMENT (OUTPATIENT)
Dept: GENERAL RADIOLOGY | Facility: HOSPITAL | Age: 74
End: 2024-05-09
Payer: MEDICAID

## 2024-05-09 PROBLEM — T38.3X5A HYPOGLYCEMIA DUE TO INSULIN: Status: ACTIVE | Noted: 2024-05-09

## 2024-05-09 PROBLEM — E16.0 HYPOGLYCEMIA DUE TO INSULIN: Status: ACTIVE | Noted: 2024-05-09

## 2024-05-09 LAB
ALBUMIN SERPL-MCNC: 4 G/DL (ref 3.5–5.2)
ALBUMIN/GLOB SERPL: 1.5 G/DL
ALP SERPL-CCNC: 102 U/L (ref 39–117)
ALT SERPL W P-5'-P-CCNC: 13 U/L (ref 1–41)
ANION GAP SERPL CALCULATED.3IONS-SCNC: 10.3 MMOL/L (ref 5–15)
ANION GAP SERPL CALCULATED.3IONS-SCNC: 9.4 MMOL/L (ref 5–15)
APTT PPP: 30 SECONDS (ref 23.5–35.5)
AST SERPL-CCNC: 13 U/L (ref 1–40)
BASOPHILS # BLD AUTO: 0.07 10*3/MM3 (ref 0–0.2)
BASOPHILS NFR BLD AUTO: 1.2 % (ref 0–1.5)
BILIRUB SERPL-MCNC: 0.4 MG/DL (ref 0–1.2)
BUN SERPL-MCNC: 14 MG/DL (ref 8–23)
BUN SERPL-MCNC: 16 MG/DL (ref 8–23)
BUN/CREAT SERPL: 15.1 (ref 7–25)
BUN/CREAT SERPL: 15.8 (ref 7–25)
CALCIUM SPEC-SCNC: 8.7 MG/DL (ref 8.6–10.5)
CALCIUM SPEC-SCNC: 9 MG/DL (ref 8.6–10.5)
CHLORIDE SERPL-SCNC: 104 MMOL/L (ref 98–107)
CHLORIDE SERPL-SCNC: 106 MMOL/L (ref 98–107)
CO2 SERPL-SCNC: 24.7 MMOL/L (ref 22–29)
CO2 SERPL-SCNC: 25.6 MMOL/L (ref 22–29)
CREAT SERPL-MCNC: 0.93 MG/DL (ref 0.76–1.27)
CREAT SERPL-MCNC: 1.01 MG/DL (ref 0.76–1.27)
DEPRECATED RDW RBC AUTO: 42.3 FL (ref 37–54)
EGFRCR SERPLBLD CKD-EPI 2021: 78.5 ML/MIN/1.73
EGFRCR SERPLBLD CKD-EPI 2021: 86.7 ML/MIN/1.73
EOSINOPHIL # BLD AUTO: 0.23 10*3/MM3 (ref 0–0.4)
EOSINOPHIL NFR BLD AUTO: 3.9 % (ref 0.3–6.2)
ERYTHROCYTE [DISTWIDTH] IN BLOOD BY AUTOMATED COUNT: 12.8 % (ref 12.3–15.4)
GEN 5 2HR TROPONIN T REFLEX: 49 NG/L
GLOBULIN UR ELPH-MCNC: 2.6 GM/DL
GLUCOSE BLDC GLUCOMTR-MCNC: 120 MG/DL (ref 70–130)
GLUCOSE BLDC GLUCOMTR-MCNC: 192 MG/DL (ref 70–130)
GLUCOSE BLDC GLUCOMTR-MCNC: 200 MG/DL (ref 70–130)
GLUCOSE BLDC GLUCOMTR-MCNC: 225 MG/DL (ref 70–130)
GLUCOSE BLDC GLUCOMTR-MCNC: 281 MG/DL (ref 70–130)
GLUCOSE BLDC GLUCOMTR-MCNC: 42 MG/DL (ref 70–130)
GLUCOSE BLDC GLUCOMTR-MCNC: 81 MG/DL (ref 70–130)
GLUCOSE BLDC GLUCOMTR-MCNC: 82 MG/DL (ref 70–130)
GLUCOSE BLDC GLUCOMTR-MCNC: 84 MG/DL (ref 70–130)
GLUCOSE BLDC GLUCOMTR-MCNC: 85 MG/DL (ref 70–130)
GLUCOSE BLDC GLUCOMTR-MCNC: 93 MG/DL (ref 70–130)
GLUCOSE SERPL-MCNC: 36 MG/DL (ref 65–99)
GLUCOSE SERPL-MCNC: 94 MG/DL (ref 65–99)
HBA1C MFR BLD: 7.7 % (ref 4.8–5.6)
HCT VFR BLD AUTO: 35.8 % (ref 37.5–51)
HGB BLD-MCNC: 12.4 G/DL (ref 13–17.7)
IMM GRANULOCYTES # BLD AUTO: 0.02 10*3/MM3 (ref 0–0.05)
IMM GRANULOCYTES NFR BLD AUTO: 0.3 % (ref 0–0.5)
INR PPP: 1.11 (ref 0.9–1.1)
LYMPHOCYTES # BLD AUTO: 1.13 10*3/MM3 (ref 0.7–3.1)
LYMPHOCYTES NFR BLD AUTO: 19 % (ref 19.6–45.3)
MCH RBC QN AUTO: 31.2 PG (ref 26.6–33)
MCHC RBC AUTO-ENTMCNC: 34.6 G/DL (ref 31.5–35.7)
MCV RBC AUTO: 90.2 FL (ref 79–97)
MONOCYTES # BLD AUTO: 0.56 10*3/MM3 (ref 0.1–0.9)
MONOCYTES NFR BLD AUTO: 9.4 % (ref 5–12)
NEUTROPHILS NFR BLD AUTO: 3.93 10*3/MM3 (ref 1.7–7)
NEUTROPHILS NFR BLD AUTO: 66.2 % (ref 42.7–76)
NRBC BLD AUTO-RTO: 0 /100 WBC (ref 0–0.2)
PLATELET # BLD AUTO: 184 10*3/MM3 (ref 140–450)
PMV BLD AUTO: 9.7 FL (ref 6–12)
POTASSIUM SERPL-SCNC: 3.6 MMOL/L (ref 3.5–5.2)
POTASSIUM SERPL-SCNC: 3.8 MMOL/L (ref 3.5–5.2)
PROT SERPL-MCNC: 6.6 G/DL (ref 6–8.5)
PROTHROMBIN TIME: 14.8 SECONDS (ref 12.3–15.1)
RBC # BLD AUTO: 3.97 10*6/MM3 (ref 4.14–5.8)
SODIUM SERPL-SCNC: 139 MMOL/L (ref 136–145)
SODIUM SERPL-SCNC: 141 MMOL/L (ref 136–145)
TROPONIN T DELTA: -10 NG/L
TROPONIN T SERPL HS-MCNC: 59 NG/L
WBC NRBC COR # BLD AUTO: 5.94 10*3/MM3 (ref 3.4–10.8)

## 2024-05-09 PROCEDURE — 96372 THER/PROPH/DIAG INJ SC/IM: CPT

## 2024-05-09 PROCEDURE — 25810000003 DEXTROSE-NACL PER 500 ML: Performed by: STUDENT IN AN ORGANIZED HEALTH CARE EDUCATION/TRAINING PROGRAM

## 2024-05-09 PROCEDURE — 82948 REAGENT STRIP/BLOOD GLUCOSE: CPT

## 2024-05-09 PROCEDURE — 25010000002 ENOXAPARIN PER 10 MG: Performed by: STUDENT IN AN ORGANIZED HEALTH CARE EDUCATION/TRAINING PROGRAM

## 2024-05-09 PROCEDURE — 96365 THER/PROPH/DIAG IV INF INIT: CPT

## 2024-05-09 PROCEDURE — 71045 X-RAY EXAM CHEST 1 VIEW: CPT

## 2024-05-09 PROCEDURE — 83036 HEMOGLOBIN GLYCOSYLATED A1C: CPT | Performed by: NURSE PRACTITIONER

## 2024-05-09 PROCEDURE — 84484 ASSAY OF TROPONIN QUANT: CPT | Performed by: STUDENT IN AN ORGANIZED HEALTH CARE EDUCATION/TRAINING PROGRAM

## 2024-05-09 PROCEDURE — 92610 EVALUATE SWALLOWING FUNCTION: CPT

## 2024-05-09 PROCEDURE — 25510000001 IOPAMIDOL 61 % SOLUTION: Performed by: STUDENT IN AN ORGANIZED HEALTH CARE EDUCATION/TRAINING PROGRAM

## 2024-05-09 PROCEDURE — G0378 HOSPITAL OBSERVATION PER HR: HCPCS

## 2024-05-09 PROCEDURE — 97166 OT EVAL MOD COMPLEX 45 MIN: CPT

## 2024-05-09 PROCEDURE — 85025 COMPLETE CBC W/AUTO DIFF WBC: CPT | Performed by: STUDENT IN AN ORGANIZED HEALTH CARE EDUCATION/TRAINING PROGRAM

## 2024-05-09 PROCEDURE — 80048 BASIC METABOLIC PNL TOTAL CA: CPT | Performed by: STUDENT IN AN ORGANIZED HEALTH CARE EDUCATION/TRAINING PROGRAM

## 2024-05-09 PROCEDURE — 82948 REAGENT STRIP/BLOOD GLUCOSE: CPT | Performed by: STUDENT IN AN ORGANIZED HEALTH CARE EDUCATION/TRAINING PROGRAM

## 2024-05-09 PROCEDURE — 96366 THER/PROPH/DIAG IV INF ADDON: CPT

## 2024-05-09 PROCEDURE — 99204 OFFICE O/P NEW MOD 45 MIN: CPT | Performed by: PSYCHIATRY & NEUROLOGY

## 2024-05-09 PROCEDURE — 36415 COLL VENOUS BLD VENIPUNCTURE: CPT

## 2024-05-09 PROCEDURE — 97162 PT EVAL MOD COMPLEX 30 MIN: CPT

## 2024-05-09 PROCEDURE — 96361 HYDRATE IV INFUSION ADD-ON: CPT

## 2024-05-09 PROCEDURE — 99223 1ST HOSP IP/OBS HIGH 75: CPT | Performed by: STUDENT IN AN ORGANIZED HEALTH CARE EDUCATION/TRAINING PROGRAM

## 2024-05-09 PROCEDURE — 63710000001 INSULIN LISPRO (HUMAN) PER 5 UNITS: Performed by: STUDENT IN AN ORGANIZED HEALTH CARE EDUCATION/TRAINING PROGRAM

## 2024-05-09 PROCEDURE — 25810000003 SODIUM CHLORIDE 0.9 % SOLUTION: Performed by: STUDENT IN AN ORGANIZED HEALTH CARE EDUCATION/TRAINING PROGRAM

## 2024-05-09 RX ORDER — DEXTROSE AND SODIUM CHLORIDE 5; .9 G/100ML; G/100ML
75 INJECTION, SOLUTION INTRAVENOUS CONTINUOUS
Status: DISCONTINUED | OUTPATIENT
Start: 2024-05-09 | End: 2024-05-09

## 2024-05-09 RX ORDER — DILTIAZEM HYDROCHLORIDE 120 MG/1
360 CAPSULE, COATED, EXTENDED RELEASE ORAL DAILY PRN
COMMUNITY
End: 2024-05-10 | Stop reason: HOSPADM

## 2024-05-09 RX ORDER — DILTIAZEM HYDROCHLORIDE 180 MG/1
360 CAPSULE, COATED, EXTENDED RELEASE ORAL DAILY PRN
Status: DISCONTINUED | OUTPATIENT
Start: 2024-05-09 | End: 2024-05-09

## 2024-05-09 RX ORDER — QUETIAPINE FUMARATE 100 MG/1
150 TABLET, FILM COATED ORAL NIGHTLY
COMMUNITY

## 2024-05-09 RX ORDER — INSULIN ASPART 100 [IU]/ML
INJECTION, SOLUTION INTRAVENOUS; SUBCUTANEOUS 3 TIMES DAILY
COMMUNITY
End: 2024-05-10 | Stop reason: HOSPADM

## 2024-05-09 RX ORDER — POLYETHYLENE GLYCOL 3350 17 G/17G
17 POWDER, FOR SOLUTION ORAL DAILY PRN
Status: DISCONTINUED | OUTPATIENT
Start: 2024-05-09 | End: 2024-05-10 | Stop reason: HOSPADM

## 2024-05-09 RX ORDER — CLOPIDOGREL BISULFATE 75 MG/1
75 TABLET ORAL DAILY
COMMUNITY

## 2024-05-09 RX ORDER — TRAMADOL HYDROCHLORIDE 50 MG/1
50 TABLET ORAL EVERY 12 HOURS PRN
Status: DISCONTINUED | OUTPATIENT
Start: 2024-05-09 | End: 2024-05-10 | Stop reason: HOSPADM

## 2024-05-09 RX ORDER — CLOPIDOGREL BISULFATE 75 MG/1
75 TABLET ORAL DAILY
Status: DISCONTINUED | OUTPATIENT
Start: 2024-05-09 | End: 2024-05-10 | Stop reason: HOSPADM

## 2024-05-09 RX ORDER — POLYETHYLENE GLYCOL 3350 17 G/17G
17 POWDER, FOR SOLUTION ORAL DAILY
COMMUNITY

## 2024-05-09 RX ORDER — ACETAMINOPHEN 160 MG/5ML
650 SOLUTION ORAL EVERY 4 HOURS PRN
Status: DISCONTINUED | OUTPATIENT
Start: 2024-05-09 | End: 2024-05-09

## 2024-05-09 RX ORDER — ACETAMINOPHEN 650 MG/1
650 SUPPOSITORY RECTAL EVERY 4 HOURS PRN
Status: DISCONTINUED | OUTPATIENT
Start: 2024-05-09 | End: 2024-05-09

## 2024-05-09 RX ORDER — PRAMIPEXOLE DIHYDROCHLORIDE 0.5 MG/1
0.5 TABLET ORAL 3 TIMES DAILY
COMMUNITY

## 2024-05-09 RX ORDER — SODIUM CHLORIDE 9 MG/ML
40 INJECTION, SOLUTION INTRAVENOUS AS NEEDED
Status: DISCONTINUED | OUTPATIENT
Start: 2024-05-09 | End: 2024-05-10 | Stop reason: HOSPADM

## 2024-05-09 RX ORDER — MIRTAZAPINE 30 MG/1
30 TABLET, FILM COATED ORAL NIGHTLY
COMMUNITY

## 2024-05-09 RX ORDER — SODIUM CHLORIDE 0.9 % (FLUSH) 0.9 %
10 SYRINGE (ML) INJECTION AS NEEDED
Status: DISCONTINUED | OUTPATIENT
Start: 2024-05-09 | End: 2024-05-10 | Stop reason: HOSPADM

## 2024-05-09 RX ORDER — DILTIAZEM HYDROCHLORIDE 120 MG/1
120 CAPSULE, COATED, EXTENDED RELEASE ORAL
Status: DISCONTINUED | OUTPATIENT
Start: 2024-05-09 | End: 2024-05-10 | Stop reason: HOSPADM

## 2024-05-09 RX ORDER — ALBUTEROL SULFATE 90 UG/1
2 AEROSOL, METERED RESPIRATORY (INHALATION) EVERY 4 HOURS PRN
Status: DISCONTINUED | OUTPATIENT
Start: 2024-05-09 | End: 2024-05-10 | Stop reason: HOSPADM

## 2024-05-09 RX ORDER — TRAMADOL HYDROCHLORIDE 50 MG/1
50 TABLET ORAL EVERY 12 HOURS PRN
COMMUNITY

## 2024-05-09 RX ORDER — AMANTADINE HYDROCHLORIDE 100 MG/1
100 CAPSULE, GELATIN COATED ORAL 2 TIMES DAILY
Status: DISCONTINUED | OUTPATIENT
Start: 2024-05-09 | End: 2024-05-10 | Stop reason: HOSPADM

## 2024-05-09 RX ORDER — LISINOPRIL 5 MG/1
2.5 TABLET ORAL DAILY
Status: DISCONTINUED | OUTPATIENT
Start: 2024-05-09 | End: 2024-05-10 | Stop reason: HOSPADM

## 2024-05-09 RX ORDER — HYDROXYZINE HYDROCHLORIDE 10 MG/1
10 TABLET, FILM COATED ORAL 2 TIMES DAILY
COMMUNITY

## 2024-05-09 RX ORDER — NICOTINE POLACRILEX 4 MG
15 LOZENGE BUCCAL
Qty: 60 G
Start: 2024-05-09

## 2024-05-09 RX ORDER — ALBUTEROL SULFATE 90 UG/1
2 AEROSOL, METERED RESPIRATORY (INHALATION) EVERY 4 HOURS PRN
COMMUNITY

## 2024-05-09 RX ORDER — TAMSULOSIN HYDROCHLORIDE 0.4 MG/1
0.4 CAPSULE ORAL DAILY
Status: DISCONTINUED | OUTPATIENT
Start: 2024-05-09 | End: 2024-05-10 | Stop reason: HOSPADM

## 2024-05-09 RX ORDER — INSULIN LISPRO 100 [IU]/ML
3-14 INJECTION, SOLUTION INTRAVENOUS; SUBCUTANEOUS
Status: DISCONTINUED | OUTPATIENT
Start: 2024-05-09 | End: 2024-05-09

## 2024-05-09 RX ORDER — BISACODYL 5 MG/1
5 TABLET, DELAYED RELEASE ORAL DAILY PRN
Status: DISCONTINUED | OUTPATIENT
Start: 2024-05-09 | End: 2024-05-10 | Stop reason: HOSPADM

## 2024-05-09 RX ORDER — GLYCOPYRROLATE 1 MG/1
1 TABLET ORAL DAILY PRN
Status: DISCONTINUED | OUTPATIENT
Start: 2024-05-09 | End: 2024-05-10 | Stop reason: HOSPADM

## 2024-05-09 RX ORDER — ENOXAPARIN SODIUM 100 MG/ML
40 INJECTION SUBCUTANEOUS DAILY
Status: DISCONTINUED | OUTPATIENT
Start: 2024-05-09 | End: 2024-05-10 | Stop reason: HOSPADM

## 2024-05-09 RX ORDER — AMOXICILLIN 250 MG
2 CAPSULE ORAL 2 TIMES DAILY PRN
Status: DISCONTINUED | OUTPATIENT
Start: 2024-05-09 | End: 2024-05-10 | Stop reason: HOSPADM

## 2024-05-09 RX ORDER — TAMSULOSIN HYDROCHLORIDE 0.4 MG/1
1 CAPSULE ORAL DAILY
COMMUNITY

## 2024-05-09 RX ORDER — NICOTINE POLACRILEX 4 MG
15 LOZENGE BUCCAL
Start: 2024-05-09 | End: 2024-05-09

## 2024-05-09 RX ORDER — NICOTINE POLACRILEX 4 MG
15 LOZENGE BUCCAL
Status: DISCONTINUED | OUTPATIENT
Start: 2024-05-09 | End: 2024-05-10 | Stop reason: HOSPADM

## 2024-05-09 RX ORDER — GLYCOPYRROLATE 1 MG/1
1 TABLET ORAL DAILY PRN
COMMUNITY

## 2024-05-09 RX ORDER — PRAMIPEXOLE DIHYDROCHLORIDE 0.25 MG/1
0.5 TABLET ORAL 3 TIMES DAILY
Status: DISCONTINUED | OUTPATIENT
Start: 2024-05-09 | End: 2024-05-10 | Stop reason: HOSPADM

## 2024-05-09 RX ORDER — LISINOPRIL 2.5 MG/1
2.5 TABLET ORAL DAILY
COMMUNITY

## 2024-05-09 RX ORDER — AMANTADINE HYDROCHLORIDE 100 MG/1
100 CAPSULE, GELATIN COATED ORAL 2 TIMES DAILY
COMMUNITY

## 2024-05-09 RX ORDER — ONDANSETRON 2 MG/ML
4 INJECTION INTRAMUSCULAR; INTRAVENOUS EVERY 6 HOURS PRN
Status: DISCONTINUED | OUTPATIENT
Start: 2024-05-09 | End: 2024-05-10 | Stop reason: HOSPADM

## 2024-05-09 RX ORDER — ACETAMINOPHEN 325 MG/1
650 TABLET ORAL EVERY 4 HOURS PRN
Status: DISCONTINUED | OUTPATIENT
Start: 2024-05-09 | End: 2024-05-10 | Stop reason: HOSPADM

## 2024-05-09 RX ORDER — SODIUM CHLORIDE 0.9 % (FLUSH) 0.9 %
10 SYRINGE (ML) INJECTION EVERY 12 HOURS SCHEDULED
Status: DISCONTINUED | OUTPATIENT
Start: 2024-05-09 | End: 2024-05-10 | Stop reason: HOSPADM

## 2024-05-09 RX ORDER — DILTIAZEM HYDROCHLORIDE 120 MG/1
120 CAPSULE, COATED, EXTENDED RELEASE ORAL
Start: 2024-05-10 | End: 2024-05-09

## 2024-05-09 RX ORDER — DEXTROSE MONOHYDRATE 25 G/50ML
25 INJECTION, SOLUTION INTRAVENOUS
Status: DISCONTINUED | OUTPATIENT
Start: 2024-05-09 | End: 2024-05-10 | Stop reason: HOSPADM

## 2024-05-09 RX ORDER — BISACODYL 10 MG
10 SUPPOSITORY, RECTAL RECTAL DAILY PRN
Status: DISCONTINUED | OUTPATIENT
Start: 2024-05-09 | End: 2024-05-10 | Stop reason: HOSPADM

## 2024-05-09 RX ORDER — NITROGLYCERIN 0.4 MG/1
0.4 TABLET SUBLINGUAL
Status: DISCONTINUED | OUTPATIENT
Start: 2024-05-09 | End: 2024-05-10 | Stop reason: HOSPADM

## 2024-05-09 RX ORDER — DILTIAZEM HYDROCHLORIDE 120 MG/1
120 CAPSULE, COATED, EXTENDED RELEASE ORAL
Qty: 30 CAPSULE | Refills: 0
Start: 2024-05-10

## 2024-05-09 RX ORDER — LATANOPROST 50 UG/ML
1 SOLUTION/ DROPS OPHTHALMIC NIGHTLY
COMMUNITY

## 2024-05-09 RX ADMIN — PRAMIPEXOLE DIHYDROCHLORIDE 0.5 MG: 0.25 TABLET ORAL at 18:05

## 2024-05-09 RX ADMIN — DEXTROSE AND SODIUM CHLORIDE 75 ML/HR: 5; 900 INJECTION, SOLUTION INTRAVENOUS at 01:50

## 2024-05-09 RX ADMIN — CLOPIDOGREL BISULFATE 75 MG: 75 TABLET ORAL at 09:21

## 2024-05-09 RX ADMIN — AMANTADINE HYDROCHLORIDE 100 MG: 100 CAPSULE ORAL at 20:19

## 2024-05-09 RX ADMIN — LISINOPRIL 2.5 MG: 5 TABLET ORAL at 09:21

## 2024-05-09 RX ADMIN — Medication 10 ML: at 20:19

## 2024-05-09 RX ADMIN — INSULIN LISPRO 5 UNITS: 100 INJECTION, SOLUTION INTRAVENOUS; SUBCUTANEOUS at 09:20

## 2024-05-09 RX ADMIN — PRAMIPEXOLE DIHYDROCHLORIDE 0.5 MG: 0.25 TABLET ORAL at 20:19

## 2024-05-09 RX ADMIN — ENOXAPARIN SODIUM 40 MG: 100 INJECTION SUBCUTANEOUS at 02:53

## 2024-05-09 RX ADMIN — DILTIAZEM HYDROCHLORIDE 120 MG: 120 CAPSULE, COATED, EXTENDED RELEASE ORAL at 12:05

## 2024-05-09 RX ADMIN — TAMSULOSIN HYDROCHLORIDE 0.4 MG: 0.4 CAPSULE ORAL at 09:21

## 2024-05-09 RX ADMIN — Medication 10 ML: at 09:21

## 2024-05-09 RX ADMIN — Medication 15 G: at 15:35

## 2024-05-09 RX ADMIN — PRAMIPEXOLE DIHYDROCHLORIDE 0.5 MG: 0.25 TABLET ORAL at 09:21

## 2024-05-09 RX ADMIN — IOPAMIDOL 100 ML: 612 INJECTION, SOLUTION INTRAVENOUS at 00:17

## 2024-05-09 RX ADMIN — AMANTADINE HYDROCHLORIDE 100 MG: 100 CAPSULE ORAL at 09:21

## 2024-05-09 RX ADMIN — SODIUM CHLORIDE 500 ML: 9 INJECTION, SOLUTION INTRAVENOUS at 04:38

## 2024-05-09 RX ADMIN — INSULIN LISPRO 8 UNITS: 100 INJECTION, SOLUTION INTRAVENOUS; SUBCUTANEOUS at 12:05

## 2024-05-09 NOTE — PLAN OF CARE
Goal Outcome Evaluation:                      New admission this day. Blood sugar monitored,. SLP evaluation completed, see note. Implanted device interrogated this day. Discharge pending

## 2024-05-09 NOTE — THERAPY EVALUATION
Patient Name: Imtiaz Cabezas  : 1950    MRN: [id number]                              Today's Date: 2024       Admit Date: 2024    Visit Dx:     ICD-10-CM ICD-9-CM   1. Hypoglycemia due to insulin  E16.0 251.1    T38.3X5A    2. Metabolic encephalopathy  G93.41 348.31   3. Generalized weakness  R53.1 780.79     Patient Active Problem List   Diagnosis    High blood pressure    Coronary artery disease involving coronary bypass graft of native heart without angina pectoris    Hyperlipidemia    Presence of combination internal cardiac defibrillator (ICD) and pacemaker    Hypoglycemia due to insulin     Past Medical History:   Diagnosis Date    Arthritis     Coronary artery disease involving coronary bypass graft of native heart without angina pectoris 2016     bypass x3 in 2012 at [place] (LIMA to the left anterior descending, SVG to right coronary artery, SVG to OM), a stent to the right coronary artery in 2016    Diabetes     Heart attack     High blood pressure     Impaired mobility     Parkinson disease      Past Surgical History:   Procedure Laterality Date    ARTERIAL BYPASS SURGERY      BACK SURGERY      CARDIAC SURGERY      CAROTID STENT      HIP SURGERY      INSERT / REPLACE / REMOVE PACEMAKER      PACEMAKER IMPLANTATION        General Information       Row Name 24 1301          OT Time and Intention    Document Type evaluation  -SD     Mode of Treatment occupational therapy  -SD       Row Name 24 1301          General Information    Patient Profile Reviewed yes  -SD     Prior Level of Function min assist:;all household mobility;max assist:;ADL's  Can normally feed self  -SD     Existing Precautions/Restrictions fall  -SD     Barriers to Rehab medically complex;previous functional deficit  -SD       Row Name 24 1301          Living Environment    People in Home facility resident  -SD       Row Name 24 1301          Cognition    Orientation Status  (Cognition) oriented x 4  -SD       Row Name 05/09/24 1301          Safety Issues, Functional Mobility    Safety Issues Affecting Function (Mobility) safety precautions follow-through/compliance;safety precaution awareness  -SD     Impairments Affecting Function (Mobility) balance;coordination;endurance/activity tolerance;motor control;motor planning;postural/trunk control;strength  -SD               User Key  (r) = Recorded By, (t) = Taken By, (c) = Cosigned By      Initials Name Provider Type    SD Ronit Marrufo OT Occupational Therapist                     Mobility/ADL's       [facility] 05/09/24 1302          Bed Mobility    Bed Mobility supine-sit  -SD     Supine-Sit [place] (Bed Mobility) moderate assist (50% patient effort)  -SD     Assistive Device (Bed Mobility) bed rails;draw sheet;head of bed elevated  -SD       Row Name 05/09/24 1302          Transfers    Transfers sit-stand transfer;bed-chair transfer  -SD       Row Name 05/09/24 1302          Bed-Chair Transfer    Bed-Chair [place] (Transfers) minimum assist (75% patient effort)  -SD     Assistive Device (Bed-Chair Transfers) walker, front-wheeled  -SD       Row Name 05/09/24 1302          Sit-Stand Transfer    Sit-Stand [place] (Transfers) minimum assist (75% patient effort)  -SD     Assistive Device (Sit-Stand Transfers) walker, front-wheeled  -SD       Row Name 05/09/24 1302          Functional Mobility    Functional Mobility- Ind. Level not tested  -SD       Row Name 05/09/24 1302          Activities of Daily Living    BADL Assessment/Intervention bathing;upper body dressing;lower body dressing;grooming;feeding;toileting  -SD       Row Name 05/09/24 1302          Bathing Assessment/Intervention    [place] Level (Bathing) maximum assist (25% patient effort)  -SD       Row Name 05/09/24 1302          Upper Body Dressing Assessment/Training    [place] Level (Upper Body Dressing) moderate assist (50% patient effort)  -SD        Row Name 05/09/24 1302          Lower Body Dressing Assessment/Training    [place] Level (Lower Body Dressing) maximum assist (25% patient effort)  -SD       Row Name 05/09/24 1302          Grooming Assessment/Training    [place] Level (Grooming) maximum assist (25% patient effort)  -SD       Row Name 05/09/24 1302          Self-Feeding Assessment/Training    [place] Level (Feeding) minimum assist (75% patient effort);moderate assist (50% patient effort)  -SD       Row Name 05/09/24 1302          Toileting Assessment/Training    [place] Level (Toileting) maximum assist (25% patient effort)  -SD               User Key  (r) = Recorded By, (t) = Taken By, (c) = Cosigned By      Initials Name Provider Type    SD Ronit Marrufo OT Occupational Therapist                   Obj/Interventions       Row Name 05/09/24 1303          Range of Motion Comprehensive    General Range of Motion bilateral upper extremity ROM WFL  -SD       Row Name 05/09/24 1303          Strength Comprehensive (MMT)    General Manual Muscle Testing (MMT) Assessment upper extremity strength deficits identified  -SD     Comment, General Manual Muscle Testing (MMT) Assessment UB 3/5 - 3+/5  -SD               User Key  (r) = Recorded By, (t) = Taken By, (c) = Cosigned By      Initials Name Provider Type    Ronit Andino OT Occupational Therapist                   Goals/Plan       Row Name 05/09/24 1306          Dressing Goal 1 (OT)    Activity/Device (Dressing Goal 1, OT) upper body dressing  -SD     [place]/Cues Needed (Dressing Goal 1, OT) minimum assist (75% or more patient effort)  -SD     Time Frame (Dressing Goal 1, OT) 2 weeks  -SD     Progress/Outcome (Dressing Goal 1, OT) goal ongoing  -SD       Row Name 05/09/24 1306          Grooming Goal 1 (OT)    Activity/Device (Grooming Goal 1, OT) grooming skills, all  -SD     [place] (Grooming Goal 1, OT) minimum assist (75% or more patient effort)  -SD      Time Frame (Grooming Goal 1, OT) 2 weeks  -SD     Progress/Outcome (Grooming Goal 1, OT) goal ongoing  -SD       Row Name 05/09/24 1306          Self-Feeding Goal 1 (OT)    Activity/Device (Self-Feeding Goal 1, OT) self-feeding skills, all  -SD     [place] Level/Cues Needed (Self-Feeding Goal 1, OT) standby assist  -SD     Time Frame (Self-Feeding Goal 1, OT) long term goal (LTG)  -SD     Progress/Outcomes (Self-Feeding Goal 1, OT) goal ongoing  -SD       Row Name 05/09/24 1306          Strength Goal 1 (OT)    Strength Goal 1 (OT) Patient to perform UB AROM/ther ex as tolerated  -SD     Time Frame (Strength Goal 1, OT) long term goal (LTG)  -SD     Progress/Outcome (Strength Goal 1, OT) goal ongoing  -SD       Row Name 05/09/24 1306          Therapy Assessment/Plan (OT)    Planned Therapy Interventions (OT) activity tolerance training;adaptive equipment training;BADL retraining;patient/caregiver education/training;ROM/therapeutic exercise;strengthening exercise;transfer/mobility retraining  -SD               User Key  (r) = Recorded By, (t) = Taken By, (c) = Cosigned By      Initials Name Provider Type    Ronit Andino OT Occupational Therapist                   Clinical Impression       Row Name 05/09/24 1303          Pain Assessment    Pretreatment Pain Rating 0/10 - no pain  -SD     Posttreatment Pain Rating 0/10 - no pain  -SD       Row Name 05/09/24 1303          Plan of Care Review    Plan of Care Reviewed With patient  -SD     Progress no change  -SD     Outcome Evaluation OT eval completed. Patient is supine in bed, denies pain at rest, is Ox4. Patient performed supine to sit with mod A, sit to stand min A using RW, tf from bed to chair with min A using RW. Patient requires max A for bathing, dressing, toileting tasks, mod A for self feeding and grooming. Patient is expected to benefit from continued OT services prior to DC and will return to [facility] when medically appropriate.  -SD       Row  Name 05/09/24 1303          Therapy Assessment/Plan (OT)    Patient/Family Therapy Goal Statement (OT) return to [facility]  -SD     Rehab Potential (OT) fair, will monitor progress closely  -SD     Criteria for Skilled Therapeutic Interventions Met (OT) skilled treatment is necessary  -SD     Therapy Frequency (OT) 3 times/wk  5 times if indicated  -SD       Row Name 05/09/24 1303          Therapy Plan Review/Discharge Plan (OT)    Anticipated Discharge Disposition (OT) other (see comments)  [facility]  -SD       Row Name 05/09/24 1303          Vital Signs    O2 Delivery Pre Treatment room air  -SD       Row Name 05/09/24 1303          Positioning and Restraints    Pre-Treatment Position in bed  -SD     Post Treatment Position chair  -SD     In Chair sitting;call light within reach;encouraged to call for assist;exit alarm on  -SD               User Key  (r) = Recorded By, (t) = Taken By, (c) = Cosigned By      Initials Name Provider Type    Ronit Andino, OT Occupational Therapist                   Outcome Measures       Row Name 05/09/24 1309          How much help from another is currently needed...    Putting on and taking off regular lower body clothing? 2  -SD     Bathing (including washing, rinsing, and drying) 2  -SD     Toileting (which includes using toilet bed pan or urinal) 2  -SD     Putting on and taking off regular upper body clothing 2  -SD     Taking care of personal grooming (such as brushing teeth) 2  -SD     Eating meals 2  -SD     AM-PAC 6 Clicks Score (OT) 12  -SD       Row Name 05/09/24 1038 05/09/24 0954       How much help from another person do you currently need...    Turning from your back to your side while in flat bed without using bedrails? 3  -HW 3  -LH    Moving from lying on back to sitting on the side of a flat bed without bedrails? 2  -HW 3  -LH    Moving to and from a bed to a chair (including a wheelchair)? 3  -HW 3  -LH    Standing up from a chair using your arms (e.g.,  wheelchair, bedside chair)? 3  -HW 3  -LH    Climbing 3-5 steps with a railing? 2  -HW 2  -LH    To walk in hospital room? 3  -HW 3  -LH    AM-PAC 6 Clicks Score (PT) 16  -HW 17  -LH    Highest Level of Mobility Goal 5 --> Static standing  - 5 --> Static standing  -      Row Name 05/09/24 1309 05/09/24 1038       Functional Assessment    Outcome Measure Options AM-PAC 6 Clicks Daily Activity (OT)  -SD AM-PAC 6 Clicks Basic Mobility (PT)  -              User Key  (r) = Recorded By, (t) = Taken By, (c) = Cosigned By      Initials Name Provider Type    Ronit Andino OT Occupational Therapist     Kedar Gusman, RN Registered Nurse     Christa Chinchilla, PT Physical Therapist                    Occupational Therapy Education       Title: PT OT SLP Therapies (In Progress)       Topic: Occupational Therapy (In Progress)       Point: ADL training (Done)       Description:   Instruct learner(s) on proper safety adaptation and remediation techniques during self care or transfers.   Instruct in proper use of assistive devices.                  Learning Progress Summary             Patient Acceptance, E,TB, VU by SD at 5/9/2024 1309    Comment: OT POC                         Point: Home exercise program (Not Started)       Description:   Instruct learner(s) on appropriate technique for monitoring, assisting and/or progressing therapeutic exercises/activities.                  Learner Progress:  Not documented in this visit.              Point: Precautions (Not Started)       Description:   Instruct learner(s) on prescribed precautions during self-care and functional transfers.                  Learner Progress:  Not documented in this visit.              Point: Body mechanics (Not Started)       Description:   Instruct learner(s) on proper positioning and spine alignment during self-care, functional mobility activities and/or exercises.                  Learner Progress:  Not documented in this visit.                               User Key       Initials Effective Dates Name Provider Type Discipline    SD 06/16/21 -  Ronit Marrufo OT Occupational Therapist OT                  OT Recommendation and Plan  Planned Therapy Interventions (OT): activity tolerance training, adaptive equipment training, BADL retraining, patient/caregiver education/training, ROM/therapeutic exercise, strengthening exercise, transfer/mobility retraining  Therapy Frequency (OT): 3 times/wk (5 times if indicated)  Plan of Care Review  Plan of Care Reviewed With: patient  Progress: no change  Outcome Evaluation: OT eval completed. Patient is supine in bed, denies pain at rest, is Ox4. Patient performed supine to sit with mod A, sit to stand min A using RW, tf from bed to chair with min A using RW. Patient requires max A for bathing, dressing, toileting tasks, mod A for self feeding and grooming. Patient is expected to benefit from continued OT services prior to DC and will return to [facility] when medically appropriate.     Time Calculation:   Evaluation Complexity (OT)  Review Occupational Profile/Medical/Therapy History Complexity: expanded/moderate complexity  Assessment, Occupational Performance/Identification of Deficit Complexity: 3-5 performance deficits  Clinical Decision Making Complexity (OT): detailed assessment/moderate complexity  Overall Complexity of Evaluation (OT): moderate complexity     Time Calculation- OT       Row Name 05/09/24 1310             Time Calculation- OT    OT Start Time 0950  -SD      OT Received On 05/09/24  -SD      OT Goal Re-Cert Due Date 05/21/24  -SD         Untimed Charges    OT Eval/Re-eval Minutes 45  -SD         Total Minutes    Untimed Charges Total Minutes 45  -SD       Total Minutes 45  -SD                User Key  (r) = Recorded By, (t) = Taken By, (c) = Cosigned By      Initials Name Provider Type    SD Ronit Marrufo OT Occupational Therapist                  Therapy Charges for Today       Code  Description Service Date Service Provider Modifiers Qty    [id number] HC OT EVAL MOD COMPLEXITY 3 5/9/2024 Ronit Marrufo OT GO 1                 Ronit Marrufo OT  5/9/2024

## 2024-05-09 NOTE — PLAN OF CARE
Goal Outcome Evaluation:  Plan of Care Reviewed With: patient        Progress: no change  Outcome Evaluation: OT eval completed. Patient is supine in bed, denies pain at rest, is Ox4. Patient performed supine to sit with mod A, sit to stand min A using RW, tf from bed to chair with min A using RW. Patient requires max A for bathing, dressing, toileting tasks, mod A for self feeding and grooming. Patient is expected to benefit from continued OT services prior to DC and will return to [facility] when medically appropriate.      Anticipated Discharge Disposition (OT): other (see comments) ([facility])

## 2024-05-09 NOTE — CONSULTS
DOS: 2024  NAME: Imtiaz Cabezas   : 1950  PCP: Provider, No Known  CC: Evaluation for Parkinson's disease  Referring MD: Kael Olvera MD, Liudmila Carpenter MD    Neurological Problem and Interval History:  73 y.o. right-handed white male with a Hx of Parkinson plus syndrome including progressive supranuclear palsy who has been followed by Dr. Liudmila Carpenter at the [facility] in [facility] as follows:    72 year old right handed white male with Dx of Parkinson's 10-15 years ago. Presenting symptoms of balance and gait onset first. Pt wheel chair bound for the past few years but is able to use walker around the house. Pt notes he feels tremors but no outward shaking. Family hx of younger brother and Uncle with Parkinson's. Pt needs help with ADL. Eating and chewing is difficult for pt in the morning but becomes easier in the afternoon. Pt has attempted to take Carbi dopa/Levadopa in the past but notes significant dyskinesia.  Sleep issues even with Remeron and Trazodone.       Review of Systems   Constitutional: Positive for fatigue. Negative for fever and unexpected weight change.   HENT: Positive for drooling. Negative for congestion, trouble swallowing and voice change.   Eyes: Positive for photophobia. Negative for visual disturbance.   Respiratory: Negative for cough and shortness of breath.   Cardiovascular: Negative for chest pain and palpitations.   Gastrointestinal: Positive for constipation. Negative for diarrhea, nausea and vomiting.   Endocrine: Positive for cold intolerance. Negative for heat intolerance.   Genitourinary: Positive for urgency. Negative for difficulty urinating.   Musculoskeletal: Positive for back pain. Negative for gait problem and neck stiffness.   Skin: Negative for rash and wound.   Allergic/Immunologic: Negative.   Neurological: Positive for speech difficulty. Negative for dizziness, tremors, seizures, syncope, weakness, numbness and headaches.   Hematological:  Negative.   Psychiatric/Behavioral: Positive for sleep disturbance. Negative for agitation, behavioral problems, confusion and hallucinations.   All other systems reviewed and are negative.      Radiology Results (last 7 days)   ** No results found for the last 168 hours. **       No visits with results within 1 Day(s) from this visit.   Latest known visit with results is:   No results found for any previous visit.         Objective     /64 (BP Location: Left arm, Patient Position: Sitting)  Pulse 75     Neurologic Exam     Mental Status   Oriented to person, place, and time.   Attention: normal. Concentration: normal.   Speech: speech is normal   Level of consciousness: alert  Knowledge: good.   Able to name object. Able to read. Able to repeat. Able to write. Normal comprehension.   hypophonia     Cranial Nerves   Cranial nerves II through XII intact.     CN III, IV, VI   Upgaze: abnormal  Downgaze: abnormal  Intact lateral gaze but impaired vertical gaze.     Motor Exam   Muscle bulk: normal  Overall muscle tone: normal    Strength   Strength 5/5 throughout.   Right neck flexion: 4/5  Left neck flexion: 4/5  Right neck extension: 4/5  Left neck extension: 4/5  Right deltoid: 4/5  Left deltoid: 4/5  Right biceps: 4/5  Left biceps: 4/5  Right triceps: 4/5  Left triceps: 4/5  Right wrist flexion: 4/5  Left wrist flexion: 4/5  Right wrist extension: 4/5  Left wrist extension: 4/5  Right interossei: 4/5  Left interossei: 4/5  Right abdominals: 4/5  Left abdominals: 4/5  Right iliopsoas: 4/5  Left iliopsoas: 4/5  Right quadriceps: 4/5  Left quadriceps: 4/5  Right hamstrin/5  Left hamstrin/5  Right glutei: 4/5  Left glutei: 4/5  Right anterior tibial: 4/5  Left anterior tibial: 4/5  Right posterior tibial: 4/5  Left posterior tibial: 4/5  Right peroneal: 4/5  Left peroneal: 4/5  Right gastroc: 4/5  Left gastroc: 4/5Generalized bradykinesia but no tremors or cogwheeling.     Sensory Exam   Light touch  normal.   Pinprick normal.     Gait, Coordination, and Reflexes     Gait  Gait: (Confined to wheelchair)    Reflexes   Right brachioradialis: 2+  Left brachioradialis: 2+  Right biceps: 2+  Left biceps: 2+  Right triceps: 2+  Left triceps: 2+  Right patellar: 2+  Left patellar: 2+  Right achilles: 2+  Left achilles: 2+      Physical Exam  Vitals and nursing note reviewed. Exam conducted with a chaperone present.   Constitutional:   Appearance: Normal appearance. He is normal weight.   HENT:   Head: Normocephalic and atraumatic.   Cardiovascular:   Rate and Rhythm: Normal rate and regular rhythm.   Pulses: Normal pulses.   Heart sounds: Normal heart sounds.   Pulmonary:   Effort: Pulmonary effort is normal.   Breath sounds: Normal breath sounds.   Musculoskeletal:   Cervical back: Normal range of motion and neck supple.   Neurological:   Mental Status: He is alert and oriented to person, place, and time.   Cranial Nerves: Cranial nerves 2-12 are intact.   Motor: Motor strength is normal.   Deep Tendon Reflexes:   Reflex Scores:  Tricep reflexes are 2+ on the right side and 2+ on the left side.  Bicep reflexes are 2+ on the right side and 2+ on the left side.  Brachioradialis reflexes are 2+ on the right side and 2+ on the left side.  Patellar reflexes are 2+ on the right side and 2+ on the left side.  Achilles reflexes are 2+ on the right side and 2+ on the left side.  Psychiatric:   Speech: Speech normal.             Assessment   Imtiaz was seen today for parkinson's disease.    Diagnoses and all orders for this visit:    Parkinson's disease (HCC)    Progressive supranuclear palsy (HCC)    Sialorrhea    REM behavioral disorder      1. Parkinson's disease (HCC)   2. Progressive supranuclear palsy (HCC)   3. Sialorrhea   4. REM behavioral disorder     Plan   Patient came to the clinic accompanied by wife son and daughter-in-law they were all present in the room throughout the exam and history and decision making.  I  have explained them in great detail about the diagnosis, complications and prognosis.  I recommended symptomatic management mainly. For now increasing the dose of Mirapex 0.5 mg to 3 times a day.  Adding low-dose Klonopin 0.5 mg 1 to 2 pills at nighttime for REM sleep behavior disorder.  Trying to arrange Botox samples for sialorrhea.    Return in about 3 months (around 9/20/2023), or When botox get approved, for botox in salivary glands, No isnurance, need Sample botox.      This patient was admitted with altered mentation but after hydration he is very much awake alert and oriented to person place time and situation and was able to give me a good history and tells me that he lives at home with his wife and he gets around using a walker.  However when he has to go outside of the house he needs to be in a wheelchair as he tends to fall backwards very easily.  He was unable to tolerate carbidopa/levodopa which gave him a lot of dyskinetic reactions and he has been switched to pramipexole 0.5 mg 3 times daily with food which he has been tolerating well.  He also received Botox for his excessive drooling/sialorrhea.  The patient feels back to his his own self and he passed his bedside swallow and he drank well water without problems.  He usually eats chopped up food but he can take his medications without crushing.  He followed one-step, two-step and three-step commands remarkably well and there is no tremors noticeable in the outstretched hands.      Past Medical/Surgical Hx:  Past Medical History:   Diagnosis Date    Arthritis     Coronary artery disease involving coronary bypass graft of native heart without angina pectoris 1/1/2016     bypass x3 in January 2012 at [facility] (LIMA to the left anterior descending, SVG to right coronary artery, SVG to OM), a stent to the right coronary artery in January 2016    Diabetes     Heart attack     High blood pressure     Parkinson disease      Past Surgical History:    Procedure Laterality Date    ARTERIAL BYPASS SURGERY      BACK SURGERY      CARDIAC SURGERY      CAROTID STENT      HIP SURGERY      INSERT / REPLACE / REMOVE PACEMAKER      PACEMAKER IMPLANTATION         Review of Systems:    Constitutional: Pleasant gentleman currently laying comfortably in the bed in no distress.  Cardiovascular: No chest pain or palpitations noted.  Respiratory: No shortness of breath noted.  Gastrointestinal: No nausea and vomiting noted.  Genitourinary: No bladder incontinence noted.  Musculoskeletal: Moves all extremities remarkably well.  Dermatological: No skin breakdown noted.  Neurological: Stigmata of Parkinson plus syndrome but no acute changes.  Psychiatric: Denies any major anxiety or depression or hallucinations or delusions  Ophthalmological: No visual changes noted.          Medications On Admission  Medications Prior to Admission   Medication Sig Dispense Refill Last Dose    amantadine (SYMMETREL) 100 MG capsule Take 1 capsule by mouth 2 (Two) Times a Day.   5/8/2024    clopidogrel (PLAVIX) 75 MG tablet Take 1 tablet by mouth Daily.   5/8/2024    hydrOXYzine (ATARAX) 10 MG tablet Take 1 tablet by mouth 2 (Two) Times a Day.   5/8/2024    insulin detemir (LEVEMIR) 100 UNIT/ML injection Inject 28 Units under the skin into the appropriate area as directed Every Night. Just moved up to 28 units from 24 units on 5/8 5/8/2024    insulin regular (humuLIN R,novoLIN R) 100 UNIT/ML injection Inject  under the skin into the appropriate area as directed 3 (Three) Times a Day Before Meals. Sliding scale   5/8/2024    latanoprost (XALATAN) 0.005 % ophthalmic solution Administer 1 drop to both eyes Every Night.   5/8/2024    lisinopril (PRINIVIL,ZESTRIL) 2.5 MG tablet Take 1 tablet by mouth Daily.   5/8/2024    mirtazapine (REMERON SOL-TAB) 30 MG disintegrating tablet Place 1 tablet on the tongue Every Night.   5/8/2024    polyethylene glycol (MiraLax) 17 g packet Take 17 g by mouth Daily.    5/8/2024    pramipexole (MIRAPEX) 0.5 MG tablet Take 1 tablet by mouth 3 (Three) Times a Day.   5/8/2024    QUEtiapine (SEROquel) 100 MG tablet Take 1 tablet by mouth Every Night.   5/8/2024    tamsulosin (FLOMAX) 0.4 MG capsule 24 hr capsule Take 1 capsule by mouth Daily.   5/8/2024 at vitamin d3    vitamin D3 125 MCG (5000 UT) capsule capsule Take 1 capsule by mouth Daily.   5/8/2024    albuterol sulfate  (90 Base) MCG/ACT inhaler Inhale 2 puffs Every 4 (Four) Hours As Needed for Wheezing.   Unknown    dilTIAZem CD (CARDIZEM CD) 120 MG 24 hr capsule Take 3 capsules by mouth Daily As Needed (For BP >150/90).   Unknown    glycopyrrolate (ROBINUL) 1 MG tablet Take 1 tablet by mouth Daily As Needed (prn).   Unknown    traMADol (ULTRAM) 50 MG tablet Take 1 tablet by mouth Every 12 (Twelve) Hours As Needed for Moderate Pain.   Unknown       Prior to Admission medications    Medication Sig Start Date End Date Taking? Authorizing Provider   amantadine (SYMMETREL) 100 MG capsule Take 1 capsule by mouth 2 (Two) Times a Day.   Yes ProviderMerly MD   clopidogrel (PLAVIX) 75 MG tablet Take 1 tablet by mouth Daily.   Yes ProviderMerly MD   hydrOXYzine (ATARAX) 10 MG tablet Take 1 tablet by mouth 2 (Two) Times a Day.   Yes Merly Robledo MD   insulin detemir (LEVEMIR) 100 UNIT/ML injection Inject 28 Units under the skin into the appropriate area as directed Every Night. Just moved up to 28 units from 24 units on 5/8   Yes ProviderMerly MD   insulin regular (humuLIN R,novoLIN R) 100 UNIT/ML injection Inject  under the skin into the appropriate area as directed 3 (Three) Times a Day Before Meals. Sliding scale   Yes Merly Robledo MD   latanoprost (XALATAN) 0.005 % ophthalmic solution Administer 1 drop to both eyes Every Night.   Yes Merly Robledo MD   lisinopril (PRINIVIL,ZESTRIL) 2.5 MG tablet Take 1 tablet by mouth Daily.   Yes Merly Robledo MD   mirtazapine (REMERON  SOL-TAB) 30 MG disintegrating tablet Place 1 tablet on the tongue Every Night.   Yes Merly Robledo MD   polyethylene glycol (MiraLax) 17 g packet Take 17 g by mouth Daily.   Yes Merly Robledo MD   pramipexole (MIRAPEX) 0.5 MG tablet Take 1 tablet by mouth 3 (Three) Times a Day.   Yes Merly Robledo MD   QUEtiapine (SEROquel) 100 MG tablet Take 1 tablet by mouth Every Night.   Yes Merly Robledo MD   tamsulosin (FLOMAX) 0.4 MG capsule 24 hr capsule Take 1 capsule by mouth Daily.   Yes Merly Robledo MD   vitamin D3 125 MCG (5000 UT) capsule capsule Take 1 capsule by mouth Daily.   Yes Merly Robledo MD   albuterol sulfate  (90 Base) MCG/ACT inhaler Inhale 2 puffs Every 4 (Four) Hours As Needed for Wheezing.    Merly Robledo MD   dilTIAZem CD (CARDIZEM CD) 120 MG 24 hr capsule Take 3 capsules by mouth Daily As Needed (For BP >150/90).    Merly Robledo MD   glycopyrrolate (ROBINUL) 1 MG tablet Take 1 tablet by mouth Daily As Needed (prn).    Merly Robledo MD   traMADol (ULTRAM) 50 MG tablet Take 1 tablet by mouth Every 12 (Twelve) Hours As Needed for Moderate Pain.    Merly Robledo MD        Allergies:  Allergies   Allergen Reactions    Codeine Anaphylaxis       Social Hx:  Social History     Socioeconomic History    Marital status:    Tobacco Use    Smoking status: Never    Smokeless tobacco: Never   Vaping Use    Vaping status: Never Used   Substance and Sexual Activity    Alcohol use: Never    Drug use: Never    Sexual activity: Defer       Family Hx:  Family History   Problem Relation Age of Onset    Parkinsonism Other     Diabetes Other     Other Mother         mitral valve replacement       Review of Imaging (Interpretation of images not reports): Cardiac pacemaker check on April 20, 2021 at the [facility] as follows:      Procedures  - documented in this encounter  Procedures  Procedure Name  Priority Date/Time Associated Diagnosis Comments   CARDIAC DEVICE CHECK - IN CLINIC Routine 04/20/2021 4:37 PM EDT Automatic implantable cardiac defibrillator in situ        CT Head Without Contrast Stroke Protocol    Addendum Date: 5/9/2024    ADDENDUM REPORT ADDENDUM: This report was discussed with Jason Taylor MD on May 09, 2024 00:20:00 EDT. Authenticated and Electronically Signed by Malcolm Mccabe DO on 05/09/2024 12:20:51 AM    Result Date: 5/9/2024  FINAL REPORT TECHNIQUE: null CLINICAL HISTORY: Neuro deficit, acute, stroke suspected COMPARISON: null FINDINGS: CT head without contrast Comparison: None Findings: No intracranial mass, midline shift, hydrocephalus, or acute hemorrhage. Subcortical/periventricular white matter hypoattenuation statistically representing changes of chronic microvascular ischemia. Global parenchymal volume loss which is commensurate with reported age. The visualized paranasal sinuses and mastoid air cells are normal. The orbits are unremarkable. No skull fracture.     Impression: IMPRESSION: 1. No acute intracranial process. 2. Subcortical/periventricular white matter hypoattenuation statistically representing changes of chronic microvascular ischemia. Global parenchymal volume loss which is commensurate with reported age. Authenticated and Electronically Signed by Malcolm Mccabe DO on 05/09/2024 12:16:49 AM            Additional Tests Performed: The electrocardiogram showed a normal QTc interval.              Laboratory Results:   Lab Results   Component Value Date    GLUCOSE 94 05/09/2024    CALCIUM 8.7 05/09/2024     05/09/2024    K 3.8 05/09/2024    CO2 24.7 05/09/2024     05/09/2024    BUN 14 05/09/2024    CREATININE 0.93 05/09/2024    BCR 15.1 05/09/2024    ANIONGAP 10.3 05/09/2024     Lab Results   Component Value Date    WBC 5.94 05/09/2024    HGB 12.4 (L) 05/09/2024    HCT 35.8 (L) 05/09/2024    MCV 90.2 05/09/2024     05/09/2024       Lab Results    Component Value Date    INR 1.11 (H) 05/08/2024    PROTIME 14.8 05/08/2024            Physical Examination:  /89   Pulse 84   Temp 97.7 °F (36.5 °C)   Resp 10   Wt 73 kg (161 lb)   SpO2 98%   BMI 25.22 kg/m²   General Appearance:   Well developed, well nourished, well groomed, alert, and cooperative.  HEENT: Normocephalic.    Neck and Spine: Normal range of motion.  Normal alignment. No mass or tenderness. No bruits.    Extremities:    No edema or deformities. Normal joint ROM.   Skin:    No rashes or birth marks.    Neurological examination:  Higher Integrative  Function: Oriented to time, place and person. Normal registration, recall, attention span and concentration. Normal language including comprehension, spontaneous speech, repetition, reading, writing, naming and vocabulary. No neglect with normal visual-spatial function and construction. Normal fund of knowledge and higher integrative function.  CN II:  Pupils are equal, round, and reactive to light. Normal visual acuity and visual fields.    CN III IV VI: Extraocular movements are full without nystagmus.   CN V:  Normal facial sensation and strength of muscles of mastication.  CN VII:  Facial movements are symmetric. No weakness.  CN VIII: Auditory acuity is normal.  CN IX & X: Symmetric palatal movement.  CN XI:  Sternocleidomastoid and trapezius are normal.  No weakness.  CN XII:  The tongue is midline.  No atrophy or fasciculations.  Motor:  Normal muscle strength, bulk and tone in upper and lower extremities.  No fasciculations, rigidity, spasticity, or abnormal movements.  Sensation: Normal to light touch, pinprick, vibration, temperature, and proprioception in arms and legs. Normal graphesthesia and no extinction on DSS.  Station and Gait: Gait and Romberg were not tested.  Usually gets around using a walker in his house and outside the house he uses a wheelchair..    Coordination:  Finger to nose test shows no dysmetria.    Heel to shin  normal.    NIHSS:    Baseline  0-->Alert: keenly responsive  0-->Answers both questions correctly  0-->Performs both tasks correctly  0=normal  0=No visual loss  0=Normal symmetric movement  0-->No drift: limb holds 90 (or 45) degrees for full 10 secs  0-->No drift: limb holds 90 (or 45) degrees for full 10 secs  0-->No drift: limb holds 90 (or 45) degrees for full 10 secs  0-->No drift: limb holds 90 (or 45) degrees for full 10 secs  0=Absent  0=Normal; no sensory loss  0=No aphasia, normal  0=Normal  0=No abnormality    Total score: 0    Diagnoses / Discussion:  73 y.o. who presents with Sx of underlying medical decompensation with dehydration and freezing episodes from the Parkinson's disease which can simulate strokes also known as the curse of Lazarus.  Patient currently back to baseline.    Plan:  Continue his home medications as before.  Physical therapy/Occupational Therapy to evaluate him to make sure he is safe to go home independently with his wife.  Speech pathology will also evaluate him to make sure that he does not choke with his altered consistency diet.  Start him on his amantadine 100 mg at 9 AM and Mirapex 0.5 mg 3 times daily with food.  Once medically cleared he can be discharged home and followed up as an outpatient with the [facility] with Dr. Stephanie Carpenter in 1 month's time..     I have discussed the above with the patient and Dr. Kael Olvera and at this point I do not have any further recommendations and will be signing off.  Time spent with patient: 70 minutes in face-to-face evaluation and management of the patient using the dedicated telemedicine device without any interruption with the help of Deacon dial rounding nurse with the patient located at the [facility] and myself at a remote location.    Electronically signed by Neena Lopez MD, 05/09/24, 9:12 AM EDT.    Dictated using Dragon dictation.

## 2024-05-09 NOTE — PLAN OF CARE
Goal Outcome Evaluation:              Outcome Evaluation: Bedside eval of swallow completed with pt. seated upright in bed for po trials. He was very pleasant and cooperative. He had some mild sticky secretions in oral cavity prior to po trials. Oral mech was remarkable for generalized weakness and reduced vocal volume as a result of Parkinson's disease and PSP which may affect strength and coordination at times. Pt. was given trials of regular solid in small amount, puree, and thin liquid. Oral phase was moderately impaired due to impact and progressive nature of weakness affecting bolus prep and transit time with solid and puree, and coordination/control with thin liquid at times. No overt s/s aspiration or other pharyngeal phase dysphagia exhibited with consistencies/trials presented, but pt. is at increased risk of aspiration from decreased control/coordination with thin liquids particularly. But no difficulty with single sip-swallows to help with pacing and amount of liquid. Pt. has been tolerating reg diet with thin liquids prior to admit and denied dysphagia with exception of difficulty chewing some solids. Recommend: 1. mechanical soft diet with thin liq; no mixed consistencies, 2. meds with pudding/applesauce, 3. single sip-swallows, no consecutive swallows, 4. aspiration precautions. D/W pt. and RN following eval.      Anticipated Discharge Disposition (SLP): unknown          SLP Swallowing Diagnosis: mild-moderate, oral dysphagia, other (see comments) (risk of pharyngeal swallow) (05/09/24 1123)  Treatment Assessment (SLP): mild-moderate, oral dysphagia (05/09/24 1123)           Anesthesia Type: 0.5% lidocaine with 1:200,000 epinephrine and a 1:10 solution of 8.4% sodium bicarbonate

## 2024-05-09 NOTE — DISCHARGE INSTRUCTIONS
Patient to be transferred back to the [facility] today.  Follow-up with PCP/cardiology/electrophysiologist/neurology.  Would uptitrate Levemir and decrease/discontinue fast-acting as patient having several episodes of hypoglycemia.  Cardizem scheduled 120 mg daily due to brief episodes of SVT.  Strict return precautions given.  Keep glucose gel available at all times due to hypoglycemia.

## 2024-05-09 NOTE — PLAN OF CARE
Goal Outcome Evaluation:  Plan of Care Reviewed With: patient        Progress: no change  Outcome Evaluation: Pt participated in PT initial evaluation this date. Pt presents supine in bed, pleasant and agreeable to PT evaluation. Pt denies reports of pain at rest, AOx4. Pt reports at baseline, he lives at home in a H with 1 KRISTI. Pt reports his wife assists him with all activities, states he ambulates with a rollator household distances and uses a w/c for community mobility. Pt denies reports of recent falls. Pt performed supine to sit on EOB with mod A x1, able to maintain static sitting balance at EOB with SBA and VC. Pt performed sit to stand transfer with RW and min A x1, noted RLE leg length discrepancy during stance, pt states this is chronic. Pt ambulated x2' to bedside chair with RW and min A. Pt noted to have a strong posterior lean during stand >sit transfer. Following evaluation, pt left seated in bedside chair with chair alarm active, call light and all needs within reach. Recommend skilled PT intervention during IP Admission to address identified impairments and allow pt to return to PLOF. Once medically stable, recommend pt to d/c home with support from famiyl and HHPT.      Anticipated Discharge Disposition (PT): home with assist, home with home health

## 2024-05-09 NOTE — CASE MANAGEMENT/SOCIAL WORK
Discharge Planning Assessment  [facility]     Patient Name: Imtiaz Cabezas  MRN: [id number]  Today's Date: 5/9/2024    Admit Date: 5/8/2024    Plan: pt sitting up in room; attempting to feed self but having a difficult time; assisted pt and he ate the pudding and drank water, with a gentle reminder to swallow and not hold food in mouth; informed rn;; pt able to answer 1-2 word questions; agreed to speaking with spouse; called and spoke with spouse, stated son/Imtiaz is POA and stated may bring a copy for chart one day; stated no financial issues but they only have medicaid because spouse was a retired pino and never paid into medicare; stated she does plan for him to return to [facility] on d/c and that he has a primary provider that comes to the facitly but she doesn't know their name; stated pt can usually feed self but does make a mess; stated pt does have some issues with depression and unsure if on medication at moment for that but aware; stated does not walk but utilizes a wheelchair and will need EMS transport back to facility; cm will continue to follow   Discharge Needs Assessment       Row Name 05/09/24 1116       Living Environment    People in Home other (see comments)    Unique Family Situation lives at [facility]    Current Living Arrangements assisted living facility    Potentially Unsafe Housing Conditions none    Primary Care Provided by other (see comments)    Provides Primary Care For no one, unable/limited ability to care for self    Family Caregiver if Needed spouse;child(genevieve), adult    Family Caregiver Names wife stated son Imtaiz STEPHENS; informed to bring copy for chart    Quality of Family Relationships supportive    Able to Return to Prior Arrangements yes    Living Arrangement Comments plans to go back to [facility]       Resource/Environmental Concerns    Resource/Environmental Concerns none    Transportation Concerns none       Transition Planning    Patient/Family  Anticipates Transition to other (see comments)    Patient/Family Anticipated Services at Transition     Transportation Anticipated health plan transportation       Discharge Needs Assessment    Readmission Within the Last 30 Days no previous admission in last 30 days    Current Outpatient/Agency/Support Group assisted living facility    Equipment Currently Used at Home walker, rolling    Concerns to be Addressed discharge planning    Anticipated Changes Related to Illness none    Equipment Needed After Discharge none    Provided Post Acute Provider List? N/A    Provided Post Acute Provider Quality & Resource List? N/A    Current Discharge Risk cognitively impaired;chronically ill                   Discharge Plan       Row Name 05/09/24 1118       Plan    Plan pt sitting up in room; attempting to feed self but having a difficult time; assisted pt and he ate the pudding and drank water, with a gentle reminder to swallow and not hold food in mouth; informed rn;; pt able to answer 1-2 word questions; agreed to speaking with spouse; called and spoke with spouse, stated son/Imtiaz is POA and stated may bring a copy for chart one day; stated no financial issues but they only have medicaid because spouse was a retired pino and never paid into medicare; stated she does plan for him to return to [facility] on d/c and that he has a primary provider that comes to the facitly but she doesn't know their name; stated pt can usually feed self but does make a mess; stated pt does have some issues with depression and unsure if on medication at moment for that but aware; stated does not walk but utilizes a wheelchair and will need EMS transport back to facility; cm will continue to follow                  Continued Care and Services - Admitted Since 5/8/2024    No active coordination exists for this encounter.          Demographic Summary       Row Name 05/09/24 1110       General Information    Admission Type  observation    Arrived From emergency department    Referral Source admission list    Reason for Consult discharge planning    Preferred Language English       Contact Information    Permission Granted to Share Info With family/designee  spouse                   Functional Status       Row Name 05/09/24 1115       Functional Status    Usual Activity Tolerance poor    Current Activity Tolerance poor       Functional Status, IADL    Medications completely dependent    Meal Preparation completely dependent    Housekeeping completely dependent    Laundry completely dependent    Shopping completely dependent       Mental Status    General Appearance WDL X  pt responding with 1-2 words; sitting up in chair; needed assist with pudding but then ate whole container and drank water with assist       Mental Status Summary    Recent Changes in Mental Status/Cognitive Functioning no changes       Employment/    Employment Status disabled                   Psychosocial    No documentation.                  Abuse/Neglect    No documentation.                  Legal    No documentation.                  Substance Abuse    No documentation.                  Patient Forms    No documentation.                     Araceli Miller RN

## 2024-05-09 NOTE — THERAPY EVALUATION
Acute Care - Speech Language Pathology   Swallow Initial Evaluation [facility]     Patient Name: Imtiaz Cabezas  : 1950  MRN: [id number]  Today's Date: 2024               Admit Date: 2024    Visit Dx:     ICD-10-CM ICD-9-CM   1. Hypoglycemia due to insulin  E16.0 251.1    T38.3X5A    2. Metabolic encephalopathy  G93.41 348.31   3. Generalized weakness  R53.1 780.79     Patient Active Problem List   Diagnosis    High blood pressure    Coronary artery disease involving coronary bypass graft of native heart without angina pectoris    Hyperlipidemia    Presence of combination internal cardiac defibrillator (ICD) and pacemaker    Hypoglycemia due to insulin     Past Medical History:   Diagnosis Date    Arthritis     Coronary artery disease involving coronary bypass graft of native heart without angina pectoris 2016     bypass x3 in 2012 at [place] (LIMA to the left anterior descending, SVG to right coronary artery, SVG to OM), a stent to the right coronary artery in 2016    Diabetes     Heart attack     High blood pressure     Impaired mobility     Parkinson disease      Past Surgical History:   Procedure Laterality Date    ARTERIAL BYPASS SURGERY      BACK SURGERY      CARDIAC SURGERY      CAROTID STENT      HIP SURGERY      INSERT / REPLACE / REMOVE PACEMAKER      PACEMAKER IMPLANTATION         SLP Recommendation and Plan  SLP Swallowing Diagnosis: mild-moderate, oral dysphagia, other (see comments) (risk of pharyngeal swallow) (24 1123)  SLP Diet Recommendation: mechanical ground textures, thin liquids, no mixed consistencies (24 1123)  Recommended Precautions and Strategies: upright posture during/after eating, small bites of food and sips of liquid, general aspiration precautions, other (see comments) (single sip-swallows, no consecutive swallows) (24 1123)  SLP Rec. for Method of Medication Administration: meds whole, meds crushed, with puree, as tolerated  (05/09/24 1123)     Monitor for Signs of Aspiration: notify SLP if any concerns, yes, cough, gurgly voice, throat clearing, right lower lobe infiltrates, pneumonia (05/09/24 1123)     Swallow Criteria for Skilled Therapeutic Interventions Met: baseline status, current level of function same as previous level of function (05/09/24 1123)  Anticipated Discharge Disposition (SLP): unknown (05/09/24 1123)     Therapy Frequency (Swallow): other (see comments) (follow for diet cleo) (05/09/24 1123)     Oral Care Recommendations: Oral Care BID/PRN, Denture Care (05/09/24 1123)                        Treatment Assessment (SLP): mild-moderate, oral dysphagia (05/09/24 1123)               Outcome Evaluation: Bedside eval of swallow completed with pt. seated upright in bed for po trials. He was very pleasant and cooperative. He had some mild sticky secretions in oral cavity prior to po trials. Oral mech was remarkable for generalized weakness and reduced vocal volume as a result of Parkinson's disease and PSP which may affect strength and coordination at times. Pt. was given trials of regular solid in small amount, puree, and thin liquid. Oral phase was moderately impaired due to impact and progressive nature of weakness affecting bolus prep and transit time with solid and puree, and coordination/control with thin liquid at times. No overt s/s aspiration or other pharyngeal phase dysphagia exhibited with consistencies/trials presented, but pt. is at increased risk of aspiration from decreased control/coordination with thin liquids particularly. But no difficulty with single sip-swallows to help with pacing and amount of liquid. Pt. has been tolerating reg diet with thin liquids prior to admit and denied dysphagia with exception of difficulty chewing some solids. Recommend: 1. mechanical soft diet with thin liq; no mixed consistencies, 2. meds with pudding/applesauce, 3. single sip-swallows, no consecutive swallows, 4. aspiration  precautions. D/W pt. and RN following eval.      SWALLOW EVALUATION (Last 72 Hours)       SLP Adult Swallow Evaluation       Row Name 05/09/24 1123                   Rehab Evaluation    Document Type evaluation  -TM        Subjective Information no complaints  -TM        Patient Observations alert;cooperative  -TM        Patient/Family/Caregiver Comments/Observations family not present  -TM        Patient Effort good  -TM           General Information    Patient Profile Reviewed yes  -TM        Pertinent History Of Current Problem PSP, Parkinson's, DM, cardiac  -TM        Current Method of Nutrition regular textures;thin liquids  -TM        Precautions/Limitations, Vision WFL;for purposes of eval  -TM        Precautions/Limitations, Hearing WFL;for purposes of eval  -TM        Prior Level of Function-Communication WFL  -TM        Prior Level of Function-Swallowing no diet consistency restrictions  -TM        Plans/Goals Discussed with patient  -TM        Barriers to Rehab medically complex  -TM        Patient's Goals for Discharge patient did not state  -TM           Pain    Additional Documentation Pain Scale: Numbers Pre/Post-Treatment (Group)  -TM           Pain Scale: Numbers Pre/Post-Treatment    Pretreatment Pain Rating 0/10 - no pain  -TM        Posttreatment Pain Rating 0/10 - no pain  -TM           Oral Motor Structure and Function    Oral Lesions or Structural Abnormalities and/or variants none identified  -TM        Dentition Assessment lower dentures/partial in place;upper dentures/partial in place  -TM        Secretion Management other (see comments)  some sticky secretions in oral cavity  -TM        Mucosal Quality sticky  -TM        Volitional Cough weak  -TM           Oral Musculature and Cranial Nerve Assessment    Oral Motor General Assessment generalized oral motor weakness  -TM        Mandibular Impairment Detail, Cranial Nerve V (Trigeminal) reduced strength bilaterally  -TM        Oral Labial  or Buccal Impairment, Detail, Cranial Nerve VII (Facial): reduced strength bilaterally  -TM        Lingual Impairment, Detail. Cranial Nerves IX, XII (Glossopharyngeal and Hypoglossal) reduced strength  -TM        Vocal Impairment, Detail. Cranial Nerve X (Vagus) vocal quality abnormality (see comments)  reduced volume due to Parkinson's  -TM        Oral Motor, Comment reduced diadokokinesis  -TM           General Eating/Swallowing Observations    Respiratory Support Currently in Use room air  -TM        Eating/Swallowing Skills fed by SLP  -TM        Positioning During Eating upright in bed  -TM        Utensils Used spoon;straw  -TM        Consistencies Trialed regular textures;pureed;thin liquids  -TM        Pre SpO2 (%) 96  -TM        Post SpO2 (%) 96  -TM           Respiratory    Respiratory Status WFL;room air;during swallowing/eating  -TM           Clinical Swallow Eval    Oral Prep Phase impaired  -TM        Oral Transit impaired  -TM        Oral Residue WFL  -TM        Pharyngeal Phase suspected pharyngeal impairment  -TM        Clinical Swallow Evaluation Summary Bedside eval of swallow completed with pt. seated upright in bed for po trials.  He was very pleasant and cooperative.  He had some mild sticky secretions in oral cavity prior to po trials.  Oral mech was remarkable for generalized weakness and reduced vocal volume as a result of Parkinson's disease and PSP which may affect strength and coordination at times. Pt. was given trials of regular solid in small amount, puree, and thin liquid.  Oral phase was moderately impaired due to impact and progressive nature of weakness affecting bolus prep and transit time with solid and puree, and coordination/control with thin liquid at times.  No overt s/s aspiration or other pharyngeal phase dysphagia exhibited with consistencies/trials presented, but pt. is at increased risk of aspiration from decreased control/coordination with thin liquids particularly.   But no difficulty with single sip-swallows to help with pacing and amount of liquid.  Pt. has been tolerating reg diet with thin liquids prior to admit and denied dysphagia with exception of difficulty chewing some solids.  Recommend:  1. mechanical soft diet with thin liq; no mixed consistencies, 2. meds with pudding/applesauce, 3. single sip-swallows, no consecutive swallows, 4. aspiration precautions.  D/W pt. and RN following eval.  -TM           Oral Prep Concerns    Oral Prep Concerns increased prep time;prolonged mastication  -TM        Prolonged Mastication regular consistencies  -TM        Increased Prep Time regular consistencies  -TM           Oral Transit Concerns    Oral Transit Concerns increased oral transit time  -TM        Increased Oral Transit Time other (see comments)  various  -TM           Pharyngeal Phase Concerns    Pharyngeal Phase Concerns other (see comments)  risk of decreased control/coordination  -TM           SLP Evaluation Clinical Impression    SLP Swallowing Diagnosis mild-moderate;oral dysphagia;other (see comments)  risk of pharyngeal swallow  -TM        Functional Impact risk of aspiration/pneumonia  -TM        Swallow Criteria for Skilled Therapeutic Interventions Met baseline status;current level of function same as previous level of function  -TM           SLP Treatment Clinical Impressions    Treatment Assessment (SLP) mild-moderate;oral dysphagia  -TM        Barriers to Overall Progress (SLP) Medically complex;Baseline deficits  -TM           Recommendations    Therapy Frequency (Swallow) other (see comments)  follow for diet cleo  -TM        SLP Diet Recommendation mechanical ground textures;thin liquids;no mixed consistencies  -TM        Recommended Precautions and Strategies upright posture during/after eating;small bites of food and sips of liquid;general aspiration precautions;other (see comments)  single sip-swallows, no consecutive swallows  -TM        Oral Care  Recommendations Oral Care BID/PRN;Denture Care  -        SLP Rec. for Method of Medication Administration meds whole;meds crushed;with puree;as tolerated  -        Monitor for Signs of Aspiration notify SLP if any concerns;yes;cough;gurgly voice;throat clearing;right lower lobe infiltrates;pneumonia  -        Anticipated Discharge Disposition (SLP) unknown  -                  User Key  (r) = Recorded By, (t) = Taken By, (c) = Cosigned By      Initials Name Effective Dates     Guerline Mahoney 06/16/21 -                     EDUCATION  The patient has been educated in the following areas:   Dysphagia (Swallowing Impairment) Oral Care/Hydration Modified Diet Instruction.              Time Calculation:    Time Calculation- SLP       Row Name 05/09/24 1343             Time Calculation- SLP    SLP Start Time 1123  -      SLP Received On 05/09/24  -         Untimed Charges    SLP Eval/Re-eval  ST Eval Oral Pharyng Swallow - [id number]  -                User Key  (r) = Recorded By, (t) = Taken By, (c) = Cosigned By      Initials Name Provider Type     Guerline Mahoney Speech and Language Pathologist                    Therapy Charges for Today       Code Description Service Date Service Provider Modifiers Qty    [id number] HC ST EVAL ORAL PHARYNG SWALLOW 4 5/9/2024 Guerline Mahoney GN 1                 Guerline Mahoney  5/9/2024

## 2024-05-09 NOTE — THERAPY EVALUATION
Patient Name: Imtiaz Cabezas  : 1950    MRN: [id number]                              Today's Date: 2024       Admit Date: 2024    Visit Dx:     ICD-10-CM ICD-9-CM   1. Hypoglycemia due to insulin  E16.0 251.1    T38.3X5A    2. Metabolic encephalopathy  G93.41 348.31   3. Generalized weakness  R53.1 780.79     Patient Active Problem List   Diagnosis    High blood pressure    Coronary artery disease involving coronary bypass graft of native heart without angina pectoris    Hyperlipidemia    Presence of combination internal cardiac defibrillator (ICD) and pacemaker    Hypoglycemia due to insulin     Past Medical History:   Diagnosis Date    Arthritis     Coronary artery disease involving coronary bypass graft of native heart without angina pectoris 2016     bypass x3 in 2012 at [facility] (LIMA to the left anterior descending, SVG to right coronary artery, SVG to OM), a stent to the right coronary artery in 2016    Diabetes     Heart attack     High blood pressure     Impaired mobility     Parkinson disease      Past Surgical History:   Procedure Laterality Date    ARTERIAL BYPASS SURGERY      BACK SURGERY      CARDIAC SURGERY      CAROTID STENT      HIP SURGERY      INSERT / REPLACE / REMOVE PACEMAKER      PACEMAKER IMPLANTATION        General Information       Row Name 24 1030          Physical Therapy Time and Intention    Document Type evaluation  -     Mode of Treatment physical therapy  -       Row Name 24 1030          General Information    Patient Profile Reviewed yes  -HW     Prior Level of Function min assist:;all household mobility  -     Existing Precautions/Restrictions fall  -     Barriers to Rehab previous functional deficit;medically complex  -       Row Name 24 1030          Living Environment    People in Home spouse  -       Row Name 24 1030          Home Main Entrance    Number of Stairs, Main Entrance one  -       Row Name  05/09/24 1030          Stairs Within Home, Primary    Number of Stairs, Within Home, Primary none  -       Row Name 05/09/24 1030          Cognition    Orientation Status (Cognition) oriented x 4  -       Row Name 05/09/24 1030          Safety Issues, Functional Mobility    Safety Issues Affecting Function (Mobility) insight into deficits/self-awareness;safety precaution awareness;awareness of need for assistance;positioning of assistive device;safety precautions follow-through/compliance;problem-solving  -     Impairments Affecting Function (Mobility) balance;endurance/activity tolerance;motor control;postural/trunk control;strength;coordination;range of motion (ROM)  -               User Key  (r) = Recorded By, (t) = Taken By, (c) = Cosigned By      Initials Name Provider Type     Christa Chinchilla PT Physical Therapist                   Mobility       Row Name 05/09/24 1031          Bed Mobility    Bed Mobility supine-sit  -     Supine-Sit [place] (Bed Mobility) moderate assist (50% patient effort);verbal cues;nonverbal cues (demo/gesture)  -     Assistive Device (Bed Mobility) bed rails;draw sheet;head of bed elevated  -[facility] Name 05/09/24 1031          Sit-Stand Transfer    Sit-Stand [place] (Transfers) minimum assist (75% patient effort);verbal cues;nonverbal cues (demo/gesture)  -     Assistive Device (Sit-Stand Transfers) walker, front-wheeled  -       Row Name 05/09/24 1031          Gait/Stairs (Locomotion)    [place] Level (Gait) minimum assist (75% patient effort)  -     Assistive Device (Gait) walker, front-wheeled  -     Patient was able to Ambulate yes  -     Distance in Feet (Gait) 2  -     Deviations/Abnormal Patterns (Gait) gait speed decreased;festinating/shuffling;base of support, narrow;bilateral deviations;kervin decreased  -     Bilateral Gait Deviations forward flexed posture  -     Right Sided Gait Deviations weight shift ability decreased  RLE  leg length discrepancy  -     [place] Level (Stairs) not tested  -               User Key  (r) = Recorded By, (t) = Taken By, (c) = Cosigned By      Initials Name Provider Type    Christa Verdugo PT Physical Therapist                   Obj/Interventions       Row Name 05/09/24 1032          Range of Motion Comprehensive    General Range of Motion bilateral lower extremity ROM WFL  -       Row Name 05/09/24 1032          Strength Comprehensive (MMT)    General Manual Muscle Testing (MMT) Assessment lower extremity strength deficits identified  -     Comment, General Manual Muscle Testing (MMT) Assessment BLE MMT grossly 4-/5  -       Row Name 05/09/24 1032          Balance    Balance Assessment sitting static balance;sitting dynamic balance;sit to stand dynamic balance;standing static balance;standing dynamic balance  -     Static Sitting Balance standby assist  -     Dynamic Sitting Balance standby assist  -     Position, Sitting Balance unsupported;sitting edge of bed  -     Sit to Stand Dynamic Balance minimal assist  -     Static Standing Balance minimal assist  -     Dynamic Standing Balance minimal assist  -     Position/Device Used, Standing Balance supported;walker, rolling  -       Row Name 05/09/24 1032          Sensory Assessment (Somatosensory)    Sensory Assessment (Somatosensory) not tested  -               User Key  (r) = Recorded By, (t) = Taken By, (c) = Cosigned By      Initials Name Provider Type    Christa Verdugo PT Physical Therapist                   Goals/Plan       Row Name 05/09/24 1037          Bed Mobility Goal 1 (PT)    Activity/Assistive Device (Bed Mobility Goal 1, PT) bed mobility activities, all  -     [place] Level/Cues Needed (Bed Mobility Goal 1, PT) minimum assist (75% or more patient effort)  -     Time Frame (Bed Mobility Goal 1, PT) long term goal (LTG);10 days  -     Progress/Outcomes (Bed Mobility Goal 1, PT) new goal  -        Row Name 05/09/24 1037          Transfer Goal 1 (PT)    Activity/Assistive Device (Transfer Goal 1, PT) transfers, all;other (see comments)  LRAD  -HW     [place] Level/Cues Needed (Transfer Goal 1, PT) contact guard required  -HW     Time Frame (Transfer Goal 1, PT) long term goal (LTG);10 days  -HW     Progress/Outcome (Transfer Goal 1, PT) new goal  -       Row Name 05/09/24 1037          Gait Training Goal 1 (PT)    Activity/Assistive Device (Gait Training Goal 1, PT) gait (walking locomotion);other (see comments)  LRAD  -HW     [place] Level (Gait Training Goal 1, PT) contact guard required  -HW     Distance (Gait Training Goal 1, PT) 75  -HW     Time Frame (Gait Training Goal 1, PT) long term goal (LTG);10 days  -HW     Progress/Outcome (Gait Training Goal 1, PT) new goal  -       Row Name 05/09/24 1037          Patient Education Goal (PT)    Activity (Patient Education Goal, PT) Pt will demonstrate (I) with BLE ther-ex 3x10 to improve strength and endurance  -HW     [place]/Cues/Accuracy (Memory Goal 2, PT) demonstrates adequately  -HW     Time Frame (Patient Education Goal, PT) short term goal (STG);5 days  -HW     Progress/Outcome (Patient Education Goal, PT) new goal  -       Row Name 05/09/24 1037          Therapy Assessment/Plan (PT)    Planned Therapy Interventions (PT) balance training;bed mobility training;gait training;home exercise program;neuromuscular re-education;motor coordination training;patient/family education;postural re-education;transfer training;stretching;strengthening;ROM (range of motion)  -               User Key  (r) = Recorded By, (t) = Taken By, (c) = Cosigned By      Initials Name Provider Type     Christa Chinchilla, PT Physical Therapist                   Clinical Impression       Row Name 05/09/24 1033          Pain    Pretreatment Pain Rating 0/10 - no pain  -     Posttreatment Pain Rating 0/10 - no pain  -       Row Name 05/09/24 1033          Plan of  Care Review    Plan of Care Reviewed With patient  -     Progress no change  -     Outcome Evaluation Pt participated in PT initial evaluation this date. Pt presents supine in bed, pleasant and agreeable to PT evaluation. Pt denies reports of pain at rest, AOx4. Pt reports at baseline, he lives at home in a H with 1 KRISTI. Pt reports his wife assists him with all activities, states he ambulates with a rollator household distances and uses a w/c for community mobility. Pt denies reports of recent falls. Pt performed supine to sit on EOB with mod A x1, able to maintain static sitting balance at EOB with SBA and VC. Pt performed sit to stand transfer with RW and min A x1, noted RLE leg length discrepancy during stance, pt states this is chronic. Pt ambulated x2' to bedside chair with RW and min A. Pt noted to have a strong posterior lean during stand >sit transfer. Following evaluation, pt left seated in bedside chair with chair alarm active, call light and all needs within reach. Recommend skilled PT intervention during IP Admission to address identified impairments and allow pt to return to [facility]. Once medically stable, recommend pt to d/c home with support from andreayl and PT.  -       Row Name 05/09/24 1033          Therapy Assessment/Plan (PT)    Patient/Family Therapy Goals Statement (PT) Pt reports he would [facility]     Rehab Potential (PT) fair, will monitor progress closely  -     Criteria for Skilled Interventions Met (PT) yes  -     Therapy Frequency (PT) 5 times/wk  -     Predicted Duration of Therapy Intervention (PT) 10 days  -       Row Name 05/09/24 1033          Vital Signs    O2 Delivery Pre Treatment room air  -HW     O2 Delivery Intra Treatment room air  -HW     O2 Delivery Post Treatment room air  -HW     Pre Patient Position Supine  -HW     Intra Patient Position Standing  -HW     Post Patient Position Sitting  -HW       Row Name 05/09/24 1033           Positioning and Restraints    Pre-Treatment Position in bed  -     Post Treatment Position chair  -     In Chair sitting;call light within reach;encouraged to call for assist;exit alarm on  -               User Key  (r) = Recorded By, (t) = Taken By, (c) = Cosigned By      Initials Name Provider Type     Christa Chinchilla PT Physical Therapist                   Outcome Measures       Row Name 05/09/24 1038 05/09/24 0954       How much help from another person do you currently need...    Turning from your back to your side while in flat bed without using bedrails? 3  - 3  -LH    Moving from lying on back to sitting on the side of a flat bed without bedrails? 2  - 3  -LH    Moving to and from a bed to a chair (including a wheelchair)? 3  - 3  -LH    Standing up from a chair using your arms (e.g., wheelchair, bedside chair)? 3  - 3  -LH    Climbing 3-5 steps with a railing? 2  - 2  -LH    To walk in hospital room? 3  - 3  -LH    AM-PAC 6 Clicks Score (PT) 16  - 17  -    Highest Level of Mobility Goal 5 --> Static standing  - 5 --> Static standing  -      Row Name 05/09/24 1038          Functional Assessment    Outcome Measure Options AM-PAC 6 Clicks Basic Mobility (PT)  -               User Key  (r) = Recorded By, (t) = Taken By, (c) = Cosigned By      Initials Name Provider Type     Kedar Gusman, RN Registered Nurse     Christa Chinchilla PT Physical Therapist                                 Physical Therapy Education       Title: PT OT SLP Therapies (In Progress)       Topic: Physical Therapy (In Progress)       Point: Mobility training (Done)       Learning Progress Summary             Patient Acceptance, E,TB, VU by  at 5/9/2024 1039    Comment: Educated pt on importance of OOB mobility during IP admission                         Point: Home exercise program (Not Started)       Learner Progress:  Not documented in this visit.              Point: Body mechanics (Not Started)        Learner Progress:  Not documented in this visit.              Point: Precautions (Not Started)       Learner Progress:  Not documented in this visit.                              User Key       Initials Effective Dates Name Provider Type Discipline     11/29/23 -  Christa Chinchilla PT Physical Therapist PT                  PT Recommendation and Plan  Planned Therapy Interventions (PT): balance training, bed mobility training, gait training, home exercise program, neuromuscular re-education, motor coordination training, patient/family education, postural re-education, transfer training, stretching, strengthening, ROM (range of motion)  Plan of Care Reviewed With: patient  Progress: no change  Outcome Evaluation: Pt participated in PT initial evaluation this date. Pt presents supine in bed, pleasant and agreeable to PT evaluation. Pt denies reports of pain at rest, AOx4. Pt reports at baseline, he lives at home in a [facility] with 1 KRISTI. Pt reports his wife assists him with all activities, states he ambulates with a rollator household distances and uses a w/c for community mobility. Pt denies reports of recent falls. Pt performed supine to sit on EOB with mod A x1, able to maintain static sitting balance at EOB with SBA and VC. Pt performed sit to stand transfer with RW and min A x1, noted RLE leg length discrepancy during stance, pt states this is chronic. Pt ambulated x2' to bedside chair with RW and min A. Pt noted to have a strong posterior lean during stand >sit transfer. Following evaluation, pt left seated in bedside chair with chair alarm active, call light and all needs within reach. Recommend skilled PT intervention during IP Admission to address identified impairments and allow pt to return to [facility]. Once medically stable, recommend pt to d/c home with support from famiyl and HHPT.     Time Calculation:   PT Evaluation Complexity  History, PT Evaluation Complexity: 1-2 personal factors and/or  comorbidities  Examination of Body Systems (PT Eval Complexity): total of 3 or more elements  Clinical Presentation (PT Evaluation Complexity): evolving  Clinical Decision Making (PT Evaluation Complexity): moderate complexity  Overall Complexity (PT Evaluation Complexity): moderate complexity     PT Charges       Row Name 05/09/24 1039             Time Calculation    Start Time 0948  -HW      PT Received On 05/09/24  -      PT Goal Re-Cert Due Date 05/19/24  -         Untimed Charges    PT Eval/Re-eval Minutes 42  -HW         Total Minutes    Untimed Charges Total Minutes 42  -HW       Total Minutes 42  -HW                User Key  (r) = Recorded By, (t) = Taken By, (c) = Cosigned By      Initials Name Provider Type     Christa Chinchilla, TAMRA Physical Therapist                  Therapy Charges for Today       Code Description Service Date Service Provider Modifiers Qty    [id number]  PT EVAL MOD COMPLEXITY 3 5/9/2024 Christa Chinchilla PT GP 1            PT G-Codes  Outcome Measure Options: AM-PAC 6 Clicks Basic Mobility (PT)  AM-PAC 6 Clicks Score (PT): 16  PT Discharge Summary  Anticipated Discharge Disposition (PT): home with assist, home with home health    Christa Chinchilla PT  5/9/2024

## 2024-05-09 NOTE — CASE MANAGEMENT/SOCIAL WORK
Continued Stay Note  [facility]     Patient Name: Imtiaz Cabezas  MRN: [id number]  Today's Date: 5/9/2024    Admit Date: 5/8/2024    Plan: spoke with Chitra/nurse Colón; stated pt can come back when ready by EMS; call report to [phone]; confirmed only insurance is [employer]   Discharge Plan       Row Name 05/09/24 1132       Plan    Plan spoke with Chitra/nurse Colón; stated pt can come back when ready by EMS; call report to [phone]; confirmed only insurance is [employer]  17:06 EDT  Called and informed Chitra/Eldon that pt has a PT/OT home health order; stated they can provide inhouse, to send order with patient; informed rn and agreed      Row Name 05/09/24 9613       Plan    Plan pt sitting up in room; attempting to feed self but having a difficult time; assisted pt and he ate the pudding and drank water, with a gentle reminder to swallow and not hold food in mouth; informed rn;; pt able to answer 1-2 word questions; agreed to speaking with spouse; called and spoke with spouse, stated son/Imtiaz is POA and stated may bring a copy for chart one day; stated no financial issues but they only have medicaid because spouse was a retired pino and never paid into medicare; stated she does plan for him to return to [facility] on d/c and that he has a primary provider that comes to the facitly but she doesn't know their name; stated pt can usually feed self but does make a mess; stated pt does have some issues with depression and unsure if on medication at moment for that but aware; stated does not walk but utilizes a wheelchair and will need EMS transport back to facility; cm will continue to follow                   Discharge Codes    No documentation.                       Araceli Miller RN

## 2024-05-09 NOTE — ED PROVIDER NOTES
EMERGENCY DEPARTMENT ENCOUNTER    Pt Name: Imtiaz Cabezas  MRN: [id number]  Pt :   1950  Room Number:    Date of encounter:  2024  PCP: Provider, No Known  ED Provider: Jason Taylor MD    Historian: EMS      HPI:  Chief Complaint: Found down        Context: Imtiaz Cabezas is a 73 y.o. male who presents to the ED c/o altered mental status/found down.  Patient had pushed his call button and was found unresponsive in his room only responding to painful stimuli.  Reportedly alert and oriented at baseline.  EMS report patient was intermittently hypotensive in route along with head what appeared to be A-fib on cardiac rhythm.  No medications given prior to arrival.      PAST MEDICAL HISTORY  Past Medical History:   Diagnosis Date    Arthritis     Coronary artery disease involving coronary bypass graft of native heart without angina pectoris 2016     bypass x3 in 2012 at [place] (LIMA to the left anterior descending, SVG to right coronary artery, SVG to OM), a stent to the right coronary artery in 2016    Diabetes     Heart attack     High blood pressure     Parkinson disease          PAST SURGICAL HISTORY  Past Surgical History:   Procedure Laterality Date    ARTERIAL BYPASS SURGERY      BACK SURGERY      CARDIAC SURGERY      CAROTID STENT      HIP SURGERY      INSERT / REPLACE / REMOVE PACEMAKER      PACEMAKER IMPLANTATION           FAMILY HISTORY  Family History   Problem Relation Age of Onset    Parkinsonism Other     Diabetes Other     Other Mother         mitral valve replacement         SOCIAL HISTORY  Social History     Socioeconomic History    Marital status:    Tobacco Use    Smoking status: Never    Smokeless tobacco: Never   Vaping Use    Vaping status: Never Used   Substance and Sexual Activity    Alcohol use: Never    Drug use: Never    Sexual activity: Defer         ALLERGIES  Codeine        REVIEW OF SYSTEMS  Review of Systems     All systems reviewed and  negative except for those discussed in HPI.       PHYSICAL EXAM    I have reviewed the triage vital signs and nursing notes.    ED Triage Vitals   Temp Pulse Resp BP SpO2   -- -- -- -- --      Temp src Heart Rate Source Patient Position BP Location FiO2 (%)   -- -- -- -- --       Physical Exam    General: Minimally responsive, elderly, mouth held agape  HEENT: Atraumatic, normocephalic, PERRLA, mucous membranes moist  NECK:  Supple, atraumatic  Cardiovascular:  Regular rate, regular rhythm, no murmurs, rubs, or gallops.  Extremities well perfused   Respiratory:  Regular rate, clear lungs to auscultation bilaterally.  No rhonchi, rales, wheezing  Abdominal:  Soft, nondistended, nontender.  No guarding or rebound.  No palpable masses  Extremity:  No visible bony abnormalities in all 4 extremities.  Full range of motion of all extremities.  Skin:  Warm and dry.  No rashes  Neuro: Responds only to painful stimuli.  Appears to move right side less than left side but unable to fully examine based on mental status.  No verbal responses.          LAB RESULTS  Recent Results (from the past 24 hour(s))   POC Glucose Once    Collection Time: 05/08/24 11:37 PM    Specimen: Blood   Result Value Ref Range    Glucose 41 (C) 70 - 130 mg/dL   Protime-INR    Collection Time: 05/08/24 11:39 PM    Specimen: Blood   Result Value Ref Range    Protime 14.8 12.3 - 15.1 Seconds    INR 1.11 (H) 0.90 - 1.10   aPTT    Collection Time: 05/08/24 11:39 PM    Specimen: Blood   Result Value Ref Range    PTT 30.0 23.5 - 35.5 seconds   Single High Sensitivity Troponin T    Collection Time: 05/08/24 11:39 PM    Specimen: Blood   Result Value Ref Range    HS Troponin T 59 (C) <22 ng/L   CBC Auto Differential    Collection Time: 05/08/24 11:39 PM    Specimen: Blood   Result Value Ref Range    WBC 5.89 3.40 - 10.80 10*3/mm3    RBC 4.29 4.14 - 5.80 10*6/mm3    Hemoglobin 13.4 13.0 - 17.7 g/dL    Hematocrit 38.4 37.5 - 51.0 %    MCV 89.5 79.0 - 97.0 fL     MCH 31.2 26.6 - 33.0 pg    MCHC 34.9 31.5 - 35.7 g/dL    RDW 12.7 12.3 - 15.4 %    RDW-SD 41.5 37.0 - 54.0 fl    MPV 9.7 6.0 - 12.0 fL    Platelets 217 140 - 450 10*3/mm3    Neutrophil % 53.5 42.7 - 76.0 %    Lymphocyte % 28.4 19.6 - 45.3 %    Monocyte % 11.4 5.0 - 12.0 %    Eosinophil % 5.3 0.3 - 6.2 %    Basophil % 1.2 0.0 - 1.5 %    Immature Grans % 0.2 0.0 - 0.5 %    Neutrophils, Absolute 3.16 1.70 - 7.00 10*3/mm3    Lymphocytes, Absolute 1.67 0.70 - 3.10 10*3/mm3    Monocytes, Absolute 0.67 0.10 - 0.90 10*3/mm3    Eosinophils, Absolute 0.31 0.00 - 0.40 10*3/mm3    Basophils, Absolute 0.07 0.00 - 0.20 10*3/mm3    Immature Grans, Absolute 0.01 0.00 - 0.05 10*3/mm3    nRBC 0.0 0.0 - 0.2 /100 WBC   Green Top (Gel)    Collection Time: 05/08/24 11:39 PM   Result Value Ref Range    Extra Tube Hold for add-ons.    Lavender Top    Collection Time: 05/08/24 11:39 PM   Result Value Ref Range    Extra Tube hold for add-on    Gold Top - SST    Collection Time: 05/08/24 11:39 PM   Result Value Ref Range    Extra Tube Hold for add-ons.    Light Blue Top    Collection Time: 05/08/24 11:39 PM   Result Value Ref Range    Extra Tube Hold for add-ons.    Comprehensive Metabolic Panel    Collection Time: 05/08/24 11:39 PM    Specimen: Blood   Result Value Ref Range    Glucose 36 (C) 65 - 99 mg/dL    BUN 16 8 - 23 mg/dL    Creatinine 1.01 0.76 - 1.27 mg/dL    Sodium 139 136 - 145 mmol/L    Potassium 3.6 3.5 - 5.2 mmol/L    Chloride 104 98 - 107 mmol/L    CO2 25.6 22.0 - 29.0 mmol/L    Calcium 9.0 8.6 - 10.5 mg/dL    Total Protein 6.6 6.0 - 8.5 g/dL    Albumin 4.0 3.5 - 5.2 g/dL    ALT (SGPT) 13 1 - 41 U/L    AST (SGOT) 13 1 - 40 U/L    Alkaline Phosphatase 102 39 - 117 U/L    Total Bilirubin 0.4 0.0 - 1.2 mg/dL    Globulin 2.6 gm/dL    A/G Ratio 1.5 g/dL    BUN/Creatinine Ratio 15.8 7.0 - 25.0    Anion Gap 9.4 5.0 - 15.0 mmol/L    eGFR 78.5 >60.0 mL/min/1.73   POC Glucose Once    Collection Time: 05/09/24 12:22 AM    Specimen: Blood    Result Value Ref Range    Glucose 120 70 - 130 mg/dL   POC Glucose Once    Collection Time: 05/09/24  1:29 AM    Specimen: Blood   Result Value Ref Range    Glucose 84 70 - 130 mg/dL       If labs were ordered, I independently reviewed the results and considered them in treating the patient.        RADIOLOGY  CT Angiogram Neck    Result Date: 5/9/2024  FINAL REPORT TECHNIQUE: null CLINICAL HISTORY: Stroke, follow up COMPARISON: null FINDINGS: CT angiography neck with contrast. 3D Postprocessing. Comparison: CT/SR - CT ANGIOGRAM HEAD W AI ANALYSIS FOR LVO - 05/08/2024 11:42 PM EDT CT/SR - CT HEAD WO CONTRAST STROKE PROTOCOL - 05/08/2024 11:42 PM EDT Findings: Aortic arch and cervical great vessels are patent. Calcified atherosclerotic disease and soft intimal plaque within the left common carotid artery resulting in less than 25% stenosis. Moderate atherosclerosis involving the bilateral carotid bulbs which extends into the proximal bilateral internal carotid arteries without high-grade stenosis. Atherosclerosis involving the bilateral ICAs of the petrous segments and the cavernous segments without stenosis. Calcified atherosclerotic disease at the origin of the bilateral vertebral arteries without stenosis. Visualized intracranial arteries are patent. No aneurysm, dissection, or occlusion. No cervical mass or fluid collection. The left lobe of the thyroid is heterogeneous. Lung apices clear. No acute fracture. Multilevel degenerative changes of the cervical spine. Partially visualized median sternotomy wires.     IMPRESSION: 1. No occlusion or hemodynamically significant stenosis involving the neck vasculature. 2. Calcified atherosclerotic disease and soft intimal plaque involving the left common carotid artery resulting in less than 25% stenosis. 3. Moderate atherosclerosis involving the bilateral carotid bulbs which extends into the proximal bilateral internal carotid arteries without high-grade stenosis.  Atherosclerosis involving the bilateral ICAs of the petrous segments and the cavernous segments without stenosis. 4. Calcified atherosclerotic disease of the origin of the bilateral vertebral arteries without high-grade stenosis. Authenticated and Electronically Signed by Malcolm Mccabe DO on 05/09/2024 12:39:56 AM    CT Angiogram Head w AI Analysis of LVO    Result Date: 5/9/2024  FINAL REPORT TECHNIQUE: null CLINICAL HISTORY: Stroke, follow up COMPARISON: null FINDINGS: CT angiography head with contrast. 3D Postprocessing. Comparison: None Findings: Intracranial arteries are patent. Moderate atherosclerosis bilateral ICAs at the level of the carotid siphons without flow-limiting stenosis No aneurysm, dissection, or occlusion. No abnormal intracranial enhancement. Subcortical/periventricular white matter hypoattenuation statistically representing changes of chronic microvascular ischemia. Global parenchymal volume loss which is commensurate with reported age. No intracranial mass, midline shift, hydrocephalus, abnormal contrast enhancement, or acute hemorrhage. No skull fracture.     IMPRESSION: 1. No occlusion or hemodynamically significant stenosis involving the cerebral vasculature. Authenticated and Electronically Signed by Malcolm Mccabe DO on 05/09/2024 12:31:21 AM    CT CEREBRAL PERFUSION WITH & WITHOUT CONTRAST    Result Date: 5/9/2024  FINAL REPORT TECHNIQUE: null CLINICAL HISTORY: Neuro deficit, acute, stroke suspected COMPARISON: null FINDINGS: CT brain perfusion Comparison: None Findings: Perfusion maps are unremarkable without evidence of cerebral ischemia or core infarct. The cerebral blood flow of less than 30% is 0 mL. The T-max of greater than 6 seconds is 0 mL.     IMPRESSION: Normal brain CT perfusion. No evidence for core infarct or ischemia. Authenticated and Electronically Signed by Malcolm Mccabe DO on 05/09/2024 12:27:56 AM    CT Head Without Contrast Stroke Protocol    Addendum Date: 5/9/2024     ADDENDUM REPORT ADDENDUM: This report was discussed with Jason Taylor MD on May 09, 2024 00:20:00 EDT. Authenticated and Electronically Signed by Malcolm Mccabe DO on 05/09/2024 12:20:51 AM    Result Date: 5/9/2024  FINAL REPORT TECHNIQUE: null CLINICAL HISTORY: Neuro deficit, acute, stroke suspected COMPARISON: null FINDINGS: CT head without contrast Comparison: None Findings: No intracranial mass, midline shift, hydrocephalus, or acute hemorrhage. Subcortical/periventricular white matter hypoattenuation statistically representing changes of chronic microvascular ischemia. Global parenchymal volume loss which is commensurate with reported age. The visualized paranasal sinuses and mastoid air cells are normal. The orbits are unremarkable. No skull fracture.     IMPRESSION: 1. No acute intracranial process. 2. Subcortical/periventricular white matter hypoattenuation statistically representing changes of chronic microvascular ischemia. Global parenchymal volume loss which is commensurate with reported age. Authenticated and Electronically Signed by Malcolm Mccabe DO on 05/09/2024 12:16:49 AM     I ordered and independently reviewed the above noted radiographic studies.     See radiologist's dictation for official interpretation.        PROCEDURES    Critical Care    Performed by: Jason Taylor MD  Authorized by: Jason Taylor MD    Critical care provider statement:     Critical care time (minutes):  40    Critical care time was exclusive of:  Separately billable procedures and treating other patients and teaching time    Critical care was necessary to treat or prevent imminent or life-threatening deterioration of the following conditions:  Shock, metabolic crisis and CNS failure or compromise    Critical care was time spent personally by me on the following activities:  Ordering and review of radiographic studies, ordering and review of laboratory studies, development of treatment plan with patient or  surrogate, evaluation of patient's response to treatment, examination of patient, obtaining history from patient or surrogate, re-evaluation of patient's condition, pulse oximetry and ordering and performing treatments and interventions    I assumed direction of critical care for this patient from another provider in my specialty: no      Care discussed with: admitting provider        ECG 12 Lead Stroke Evaluation   Final Result          MEDICATIONS GIVEN IN ER    Medications   sodium chloride 0.9 % flush 10 mL (has no administration in time range)   Naloxone HCl (NARCAN) injection 2 mg (0 mg Intravenous Hold 5/9/24 0019)   dextrose (D50W) (25 g/50 mL) IV injection 50 mL (50 mL Intravenous Given 5/8/24 2340)   dextrose 5 % and sodium chloride 0.9 % infusion (has no administration in time range)   iopamidol (ISOVUE-300) 61 % injection 100 mL (100 mL Intravenous Given 5/9/24 0017)         MEDICAL DECISION MAKING, PROGRESS, and CONSULTS    All labs, if obtained, have been independently reviewed by me.  All radiology studies, if obtained, have been reviewed by me and the radiologist dictating the report.  All EKG's, if obtained, have been independently viewed and interpreted by me.      Discussion below represents my analysis of pertinent findings related to patient's condition, differential diagnosis, treatment plan and final disposition.    Imtiaz Cabezas is a 73 y.o. male who presents to the ED by EMS for altered mental status.  Significantly altered on arrival with no verbal response and only responds to painful stimuli.  GCS of 8 on arrival.  Brought in by EMS.  Differential includes was not limited to stroke, cardiac arrhythmia, myocardial infarction, aspiration event, hypoglycemia, sepsis.  Extensive labs ordered along with stroke protocol CT and CTA imaging.  Critical point-of-care glucose on arrival of 41, for which patient was given D50 IV injection.    Chest x-ray personally reviewed and interpreted negative  for evidence of acute infectious abnormality or pneumonia.  Mentation improving following D50.    CT head without contrast obtained which was negative for acute intracranial abnormality.  Upon further history reported from patient's wife the rehab facility/nursing home recently upped his insulin dosing, potentially causing tonight's hypoglycemic episode.    Patient still has generalized weakness on exam but no focal weakness noted.  Patient was able to wake up and talk to his wife at bedside and denies any current complaints other than feeling very tired.  Patient glucose started to slowly drop again so patient was started on low-dose D5 drip.  Patient to be admitted by hospitalist for further observation.  Patient's family agreeable with this plan.                                 Orders placed during this visit:  Orders Placed This Encounter   Procedures    Critical Care    CT Head Without Contrast Stroke Protocol    CT Angiogram Head w AI Analysis of LVO    CT Angiogram Neck    CT CEREBRAL PERFUSION WITH & WITHOUT CONTRAST    XR Chest 1 View    Urinalysis With Microscopic If Indicated (No Culture) - Urine, Catheter    [place] Draw    Protime-INR    aPTT    Single High Sensitivity Troponin T    CBC Auto Differential    Comprehensive Metabolic Panel    High Sensitivity Troponin T 2Hr    NPO Diet NPO Type: Strict NPO    Initiate Department's Acute Stroke Process (Team D, Code 19, etc.)    Perform NIH Stroke Scale    Measure Actual Weight    Notify Provider    Notify Provider for SBP Greater Than 140 for Hemorrhagic Stroke Patient    Head of Bed 30 Degrees or Less    Undress and Gown    Continuous Pulse Oximetry    Vital Signs    Neuro Checks    No Hypotonic Fluids    Nursing Dysphagia Screening (Complete Prior to Giving anything PO)    RN to Place Order SLP Consult (IF swallow screen failed) - Eval & Treat Choosing Reason of RN Dysphagia Screen Failed    Inpatient Neurology Consult Stroke    Inpatient Neurology  Consult Stroke    Oxygen Therapy- Nasal Cannula; Titrate 1-6 LPM Per SpO2; 90 - 95%    SLP Consult: Eval & Treat RN Dysphagia Screen Failed    POC Glucose Once    POC Glucose STAT    POC Glucose STAT    POC Glucose Once    POC Glucose Once    ECG 12 Lead Stroke Evaluation    Insert Large-Bore Peripheral IV - RIGHT AC Preferred    Initiate Observation Status    CBC & Differential    Green Top (Gel)    Lavender Top    Gold Top - SST    Light Blue Top         ED Course:    Consultants:                  Shared Decision Making:  After my consideration of clinical presentation and any laboratory/radiology studies obtained, I discussed the findings with the patient/patient representative who is in agreement with the treatment plan and the final disposition.   Risks and benefits of discharge and/or observation/admission were discussed.      AS OF 01:36 EDT VITALS:    BP - 111/52  HR - 78  TEMP - 97.7 °F (36.5 °C)  O2 SATS - 97%                  DIAGNOSIS  Final diagnoses:   Hypoglycemia due to insulin   Metabolic encephalopathy   Generalized weakness         DISPOSITION  Admit      Please note that portions of this document were completed with voice recognition software.        Jason Taylor MD  05/09/24 0136

## 2024-05-09 NOTE — DISCHARGE SUMMARY
[facility]   DISCHARGE SUMMARY      Name:  Imtiaz Cabezas   Age:  73 y.o.  Sex:  male  :  1950  MRN:  [id number]   Visit Number:  [id number]    Admission Date:  2024  Date of Discharge:  2024  Primary Care Physician:  Provider, No Known    Important issues to note:    -Patient admitted with unresponsiveness with arriving fingerstick blood sugar of 41.  Patient improved after amp of D50.  -Patient also noted to have brief episodes of SVT during admission for which he was completely asymptomatic.  ICD interrogation completed.  Patient has not followed with electrophysiologist or cardiology in the past 2 years.  Follow-up appointments encouraged.  Cardizem scheduled instead of as needed as blood pressure tolerates.  -A1c 7.7.  Given poor p.o. intake would decrease fast acting insulin and uptitrate Levemir as needed.  -Speech consulted during admission and recommended mechanical ground diet with thin liquids and no mixed consistencies with continued aspiration precautions.  -Home health ordered as wife interested in continuing PT and OT.  -Otherwise reassuring labs and vitals noted.  Patient will be transferred back to the [facility] today.  -Strict return precautions given.    -Addendum 1700-patient discharge held due to continued hypoglycemia.  Patient alert and oriented.  Otherwise asymptomatic.  Sliding scale insulin discontinued with basal added.  Patient able to take p.o. with improved glucose noted.    Discharge Diagnoses:     Acute metabolic encephalopathy secondary to hypoglycemia, POA  Supraventricular tachycardia, POA  Elevated troponin, POA  Parkinson's disease  Diabetes mellitus type 2 on insulin  Coronary artery disease with history of CABG  Essential hypertension  Hyperlipidemia  V. tach/sick sinus syndrome status post ICD  Spinal stenosis    Problem List:     Active Hospital Problems    Diagnosis  POA    **Hypoglycemia due to insulin [E16.0, T38.3X5A]  Yes       Resolved Hospital Problems   No resolved problems to display.     Presenting Problem:    Chief Complaint   Patient presents with    Loss of Consciousness      Consults:     Consulting Physician(s)         Provider   Role Specialty     Neena Lopez MD      Consulting Physician Neurology          Procedures Performed:        History of presenting illness/Hospital Course:    Imtiaz Cabezas is a 73-year-old man, nursing home resident, with past medical history of Parkinson's disease, type 2 diabetes on insulin, coronary artery disease with history of CABG, hypertension, hyperlipidemia, atrial cardiac defibrillator and pacemaker, history of prostate cancer, history of laminectomies, spinal stenosis.  Presented to [facility] ED 5/8/2024 with concern for altered mental status, found down.  Reportedly was unresponsive, only responding to painful stimuli.  At baseline he is alert and oriented.  EMS reported intermittently hypotensive en route, appeared to be in atrial fibrillation on cardiac rhythm.  No medications given prior to arrival.     ED summary: Afebrile, vital signs stable on room air.  EKG sinus rhythm, PVCs noted, nonspecific ST-T wave changes.  High-sensitivity troponin 59, ACS not suspected.  Patient was unresponsive at presentation.  Blood glucose as low as 36.  Blood work otherwise unremarkable.  Stroke alert out of an abundance of caution.  CT angio head negative.  CT angio neck some stenosis, carotid artery, carotid bulbs atherosclerosis, bilateral vertebral arteries sclerosis without high-grade stenosis.  CT head no acute intracranial process, chronic microvascular ischemia present.  CT cerebral perfusion unremarkable.  He was given amp of D50, started on D5NS fluids.  Reportedly patient awakened shortly thereafter.  Patient currently alert and oriented x 3 and eating per usual.  Patient noted that he did not eat well yesterday and continued to take insulin.  Neurology consulted during admission with  continued home medications.  Patient will follow-up with Dr. Carpenter, usual neurologist.  Patient also noted to have episodes of SVT during admission for which she was completely asymptomatic, only lasting seconds.  Patient noted to take Cardizem at the nursing facility as needed for systolic greater than 150.  Cardizem reinitiated with ICD interrogated.  Patient will need to follow-up with cardiologist and electrophysiologist.  Strict return precautions given.  Encouraged to keep glucose gel available for hypoglycemia.    Vital Signs:    Temp:  [97.7 °F (36.5 °C)] 97.7 °F (36.5 °C)  Heart Rate:  [] 76  Resp:  [10-16] 16  BP: ()/(42-89) 129/68    Physical Exam:    General Appearance:  Alert and cooperative.  Chronically ill middle-age male.  Frail.  Slow to respond.   Head:  Atraumatic and normocephalic.   Eyes: Conjunctivae and sclerae normal, no icterus. No pallor.   Ears:  Ears with no abnormalities noted.   Throat: No oral lesions, no thrush, oral mucosa moist.   Neck: Supple, trachea midline, no thyromegaly.   Back:   No kyphoscoliosis present. No tenderness to palpation.   Lungs:   Breath sounds heard bilaterally equally.  No crackles or wheezing. No Pleural rub or bronchial breathing.  On room air unlabored.   Heart:  Normal S1 and S2, no murmur, no gallop, no rub. No JVD.   Abdomen:   Normal bowel sounds, no masses, no organomegaly. Soft, nontender, nondistended, no rebound tenderness.   Extremities: Supple, no edema, no cyanosis, no clubbing.  Severe rigidity noted of extremities due to Parkinson's.   Pulses: Pulses palpable bilaterally.   Skin: No bleeding or rash.   Neurologic: Alert and oriented x 3. No facial asymmetry. Moves all four limbs. No tremors.  Flat affect.     Pertinent Lab Results:     Results from last 7 days   Lab Units 05/09/24  0313 05/08/24  2339   SODIUM mmol/L 141 139   POTASSIUM mmol/L 3.8 3.6   CHLORIDE mmol/L 106 104   CO2 mmol/L 24.7 25.6   BUN mg/dL 14 16   CREATININE  mg/dL 0.93 1.01   CALCIUM mg/dL 8.7 9.0   BILIRUBIN mg/dL  --  0.4   ALK PHOS U/L  --  102   ALT (SGPT) U/L  --  13   AST (SGOT) U/L  --  13   GLUCOSE mg/dL 94 36*     Results from last 7 days   Lab Units 05/09/24  0455 05/08/24  2339   WBC 10*3/mm3 5.94 5.89   HEMOGLOBIN g/dL 12.4* 13.4   HEMATOCRIT % 35.8* 38.4   PLATELETS 10*3/mm3 184 217     Results from last 7 days   Lab Units 05/08/24  2339   INR  1.11*     Results from last 7 days   Lab Units 05/09/24  0313 05/08/24  2339   HSTROP T ng/L 49* 59*                           Pertinent Radiology Results:    Imaging Results (All)       Procedure Component Value Units Date/Time    XR Chest 1 View [[id number]] Collected: 05/09/24 0937     Updated: 05/09/24 0939    Narrative:      PROCEDURE: XR CHEST 1 VW-     HISTORY: Acute Stroke Protocol (onset < 12 hrs)     COMPARISON:  None     FINDINGS:  Portable view of the chest demonstrates the lungs to be  grossly clear. There is no evidence of effusion, pneumothorax or other  significant pleural disease. The mediastinum is unremarkable.     The heart size is normal. Left-sided pacer is present. Patient is status  post CABG.       Impression:      No acute findings           This report was signed and finalized on 5/9/2024 9:37 AM by Malcolm Ling MD.       CT Angiogram Neck [[id number]] Collected: 05/09/24 0039     Updated: 05/09/24 0041    Narrative:      FINAL REPORT    TECHNIQUE:  null    CLINICAL HISTORY:  Stroke, follow up    COMPARISON:  null    FINDINGS:  CT angiography neck with contrast. 3D Postprocessing.    Comparison: CT/SR - CT ANGIOGRAM HEAD W AI ANALYSIS FOR LVO - 05/08/2024 11:42 PM EDT    CT/SR - CT HEAD WO CONTRAST STROKE PROTOCOL - 05/08/2024 11:42 PM EDT    Findings:    Aortic arch and cervical great vessels are patent.    Calcified atherosclerotic disease and soft intimal plaque within the left common carotid artery resulting in less than 25% stenosis.    Moderate atherosclerosis involving the bilateral  carotid bulbs which extends into the proximal bilateral internal carotid arteries without high-grade stenosis. Atherosclerosis involving the bilateral ICAs of the petrous segments and the cavernous   segments without stenosis.    Calcified atherosclerotic disease at the origin of the bilateral vertebral arteries without stenosis.    Visualized intracranial arteries are patent.    No aneurysm, dissection, or occlusion.    No cervical mass or fluid collection.    The left lobe of the thyroid is heterogeneous.    Lung apices clear.    No acute fracture. Multilevel degenerative changes of the cervical spine.    Partially visualized median sternotomy wires.      Impression:      IMPRESSION:    1. No occlusion or hemodynamically significant stenosis involving the neck vasculature.    2. Calcified atherosclerotic disease and soft intimal plaque involving the left common carotid artery resulting in less than 25% stenosis.    3. Moderate atherosclerosis involving the bilateral carotid bulbs which extends into the proximal bilateral internal carotid arteries without high-grade stenosis. Atherosclerosis involving the bilateral ICAs of the petrous segments and the cavernous   segments without stenosis.    4. Calcified atherosclerotic disease of the origin of the bilateral vertebral arteries without high-grade stenosis.    Authenticated and Electronically Signed by Malcolm Mccabe DO on  05/09/2024 12:39:56 AM    CT Angiogram Head w AI Analysis of LVO [[id number]] Collected: 05/09/24 0031     Updated: 05/09/24 0032    Narrative:      FINAL REPORT    TECHNIQUE:  null    CLINICAL HISTORY:  Stroke, follow up    COMPARISON:  null    FINDINGS:  CT angiography head with contrast. 3D Postprocessing.    Comparison: None    Findings:    Intracranial arteries are patent.    Moderate atherosclerosis bilateral ICAs at the level of the carotid siphons without flow-limiting stenosis    No aneurysm, dissection, or occlusion.    No abnormal  intracranial enhancement. Subcortical/periventricular white matter hypoattenuation statistically representing changes of chronic microvascular ischemia. Global parenchymal volume loss which is commensurate with reported age.    No intracranial mass, midline shift, hydrocephalus, abnormal contrast enhancement, or acute hemorrhage.    No skull fracture.      Impression:      IMPRESSION:    1. No occlusion or hemodynamically significant stenosis involving the cerebral vasculature.    Authenticated and Electronically Signed by Malcolm Mccabe DO on  05/09/2024 12:31:21 AM    CT CEREBRAL PERFUSION WITH & WITHOUT CONTRAST [[id number]] Collected: 05/09/24 0027     Updated: 05/09/24 0029    Narrative:      FINAL REPORT    TECHNIQUE:  null    CLINICAL HISTORY:  Neuro deficit, acute, stroke suspected    COMPARISON:  null    FINDINGS:  CT brain perfusion    Comparison: None    Findings:    Perfusion maps are unremarkable without evidence of cerebral ischemia or core infarct.    The cerebral blood flow of less than 30% is 0 mL.    The T-max of greater than 6 seconds is 0 mL.      Impression:      IMPRESSION:    Normal brain CT perfusion. No evidence for core infarct or ischemia.    Authenticated and Electronically Signed by Malcolm Mccabe DO on  05/09/2024 12:27:56 AM    CT Head Without Contrast Stroke Protocol [[id number]] Collected: 05/09/24 0016     Updated: 05/09/24 0022    Addenda:        ADDENDUM REPORT    ADDENDUM:  This report was discussed with Jason Taylor MD on May 09, 2024 00:20:00   EDT.    Authenticated and Electronically Signed by Malcolm Mccabe DO on  05/09/2024 12:20:51 AM  Signed: 05/09/24 0020 by Malcolm Mccabe DO    Narrative:      FINAL REPORT    TECHNIQUE:  null    CLINICAL HISTORY:  Neuro deficit, acute, stroke suspected    COMPARISON:  null    FINDINGS:  CT head without contrast    Comparison: None    Findings:    No intracranial mass, midline shift, hydrocephalus, or acute  hemorrhage.    Subcortical/periventricular white matter hypoattenuation statistically representing changes of chronic microvascular ischemia. Global parenchymal volume loss which is commensurate with reported age.    The visualized paranasal sinuses and mastoid air cells are normal.    The orbits are unremarkable.    No skull fracture.      Impression:      IMPRESSION:    1. No acute intracranial process.    2. Subcortical/periventricular white matter hypoattenuation statistically representing changes of chronic microvascular ischemia. Global parenchymal volume loss which is commensurate with reported age.    Authenticated and Electronically Signed by Malcolm Mccabe DO on  05/09/2024 12:16:49 AM            Echo:      Condition on Discharge:      Stable.    Code status during the hospital stay:    Code Status and Medical Interventions:   Ordered at: 05/09/24 0207     Medical Intervention Limits:    NO intubation (DNI)     Code Status (Patient has no pulse and is not breathing):    No CPR (Do Not Attempt to Resuscitate)     Medical Interventions (Patient has pulse or is breathing):    Limited Support     Discharge Disposition:    Skilled Nursing Facility (DC - External)    Discharge Medications:       Discharge Medications        New Medications        Instructions Start Date   dextrose 40 % gel  Commonly known as: GLUTOSE   15 g, Oral, Every 15 Minutes PRN             Changes to Medications        Instructions Start Date   dilTIAZem  MG 24 hr capsule  Commonly known as: CARDIZEM CD  What changed:   how much to take  when to take this  reasons to take this   120 mg, Oral, Every 24 Hours Scheduled   Start Date: May 10, 2024            Continue These Medications        Instructions Start Date   albuterol sulfate  (90 Base) MCG/ACT inhaler  Commonly known as: PROVENTIL HFA;VENTOLIN HFA;PROAIR HFA   2 puffs, Inhalation, Every 4 Hours PRN      amantadine 100 MG capsule  Commonly known as: SYMMETREL   100 mg,  Oral, 2 Times Daily      clopidogrel 75 MG tablet  Commonly known as: PLAVIX   75 mg, Oral, Daily      glycopyrrolate 1 MG tablet  Commonly known as: ROBINUL   1 mg, Oral, Daily PRN      hydrOXYzine 10 MG tablet  Commonly known as: ATARAX   10 mg, Oral, 2 Times Daily      Insulin Aspart 100 UNIT/ML injection  Commonly known as: novoLOG   Subcutaneous, 3 Times Daily, Sliding scale dosing three times daily after meals (0800, 1200, 1700) per facility list      insulin detemir 100 UNIT/ML injection  Commonly known as: LEVEMIR   28 Units, Subcutaneous, Nightly, Just moved up to 28 units from 24 units on 5/8      latanoprost 0.005 % ophthalmic solution  Commonly known as: XALATAN   1 drop, Both Eyes, Nightly      lisinopril 2.5 MG tablet  Commonly known as: PRINIVIL,ZESTRIL   2.5 mg, Oral, Daily      MiraLax 17 g packet  Generic drug: polyethylene glycol   17 g, Oral, Daily      mirtazapine 30 MG disintegrating tablet  Commonly known as: REMERON SOL-TAB   30 mg, Translingual, Nightly      pramipexole 0.5 MG tablet  Commonly known as: MIRAPEX   0.5 mg, Oral, 3 Times Daily      QUEtiapine 100 MG tablet  Commonly known as: SEROquel   150 mg, Oral, Nightly      tamsulosin 0.4 MG capsule 24 hr capsule  Commonly known as: FLOMAX   1 capsule, Oral, Daily      traMADol 50 MG tablet  Commonly known as: ULTRAM   50 mg, Oral, Every 12 Hours PRN      vitamin D3 125 MCG (5000 UT) capsule capsule   5,000 Units, Oral, Daily             Stop These Medications      insulin regular 100 UNIT/ML injection  Commonly known as: humuLIN R,novoLIN R            Discharge Diet:     Diet Instructions       Diet: Cardiac Diets, Diabetic Diets; Healthy Heart (2-3 Na+); Mechanical Ground (NDD 2); Thin (IDDSI 0); Consistent Carbohydrate; Feeding Assistance - Nursing      Discharge Diet:  Cardiac Diets  Diabetic Diets       Cardiac Diet: Healthy Heart (2-3 Na+)    Texture: Mechanical Ground (NDD 2)    Fluid Consistency: Thin (IDDSI 0)    Diabetic Diet:  Consistent Carbohydrate    Diet Modifiers / Additional Diets: Feeding Assistance - Nursing          Activity at Discharge:     Activity Instructions       Activity as Tolerated            Follow-up Appointments:     Follow-up Information       Provider, No Known Follow up.    Contact information:  [facility] [address]  [phone]               Liudmila Carpenter MD Follow up in 1 month(s).    Specialty: Neurology  Contact information:  [address]  [facility] [id number] [phone]               Víctor Vora MD Follow up in 1 month(s).    Specialties: Cardiac Electrophysiology, Cardiology  Contact information:  [address]  [address]  [facility] [address]  [phone]               Anshul Xie MD Follow up in 1 week(s).    Specialties: Cardiology, Interventional Radiology  Contact information:  [address]  [address]  [facility] [address]  [phone]                           No future appointments.  Test Results Pending at Discharge:           Catia Mcclain, APRN  05/09/24  13:53 EDT    Time: I spent 50 minutes on this discharge activity which included: face-to-face encounter with the patient, reviewing the data in the system, coordination of the care with the nursing staff as well as consultants, documentation, and entering orders.     Dictated utilizing Dragon dictation.

## 2024-05-09 NOTE — PAYOR COMM NOTE
TO:HUMANA  MK  FROM:GYPSY STACK RN PHONE [phone]  OBSERVATION NOTIFICATION/CLINICALS  TAX ID [id number] NPI [id number]     Cassandra Cabezas (73 y.o. Male)       Date of Birth   1950    Social Security Number       Address   [address] [facility] [place] [address]    Home Phone   [phone]    MRN   [id number]       Presybeterian   Mennonite    Marital Status                               Admission Date   24    Admission Type   Emergency    Admitting Provider   Kerley, Brian Joseph, DO    Attending Provider   Kael Olvera MD    Department, Room/Bed   [facility] EMERGENCY DEPARTMENT, [place]       Discharge Date       Discharge Disposition       Discharge Destination                                 Attending Provider: Kael Olvera MD    Allergies: Codeine    Isolation: None   Infection: None   Code Status: No CPR    Ht: --   Wt: 73 kg (161 lb)    Admission Cmt: None   Principal Problem: Hypoglycemia due to insulin [E16.0,T38.3X5A]                   Active Insurance as of 2024       Primary Coverage       Payor Plan Insurance Group Employer/Plan Group    [place] [id number]       Payor Plan Address Payor Plan Phone Number Payor Plan Fax Number Effective Dates    [employer] [address] [address] [phone]  2023 - None Entered    Formerly [facility] [id number]         Subscriber Name Subscriber Birth Date Member ID       CASSANDRA CABEZAS 1950 [id number]                     Emergency Contacts        (Rel.) Home Phone Work Phone Mobile Phone    RICARDO CABEZAS (Spouse) -- -- [phone]    Willard Cabezas (Son) -- -- [phone]    BALDEMAR VELA (Daughter) -- -- [phone]                 History & Physical        Kerley, Brian Joseph, DO at 24 0131            [facility] HOSPITALIST   HISTORY AND PHYSICAL      Name:  Cassandra Cabezas   Age:  73 y.o.  Sex:  male  :  1950  MRN:  [id number]   Visit Number:   [id number]  Admission Date:  5/8/2024  Date Of Service:  05/09/24  Primary Care Physician:  Provider, No Known    Chief Complaint:     Found down    History Of Presenting Illness:      Imtiaz Cabezas is a 73-year-old man, nursing home resident, with past medical history of Parkinson's disease, type 2 diabetes on insulin, coronary artery disease with history of CABG, hypertension, hyperlipidemia, atrial cardiac defibrillator and pacemaker, history of prostate cancer, history of laminectomies, spinal stenosis.  Presented to [facility] ED 5/8/2024 with concern for altered mental status, found down.  Reportedly was unresponsive, only responding to painful stimuli.  At baseline he is alert and oriented.  EMS reported intermittently hypotensive en route, appeared to be in atrial fibrillation on cardiac rhythm.  No medications given prior to arrival.    ED summary: Afebrile, vital signs stable on room air.  EKG sinus rhythm, PVCs noted, nonspecific ST-T wave changes.  High-sensitivity troponin 59, ACS not suspected.  Patient was unresponsive at presentation.  Blood glucose as low as 36.  Blood work otherwise unremarkable.  Stroke alert out of an abundance of caution.  CT angio head negative.  CT angio neck some stenosis, carotid artery, carotid bulbs atherosclerosis, bilateral vertebral arteries sclerosis without high-grade stenosis.  CT head no acute intracranial process, chronic microvascular ischemia present.  CT cerebral perfusion unremarkable.  He was given amp of D50, started on D5NS fluids.  Reportedly woke up and asked his wife to cover up his arm because he was cold.    Review Of Systems:    All systems were reviewed and negative except as mentioned in history of presenting illness, assessment and plan.    Past Medical History: Patient  has a past medical history of Arthritis, Coronary artery disease involving coronary bypass graft of native heart without angina pectoris (1/1/2016), Diabetes, Heart attack, High blood  pressure, and Parkinson disease.    Past Surgical History: Patient  has a past surgical history that includes Back surgery; Cardiac surgery; Hip surgery; Pacemaker Implantation; Arterial bypass surgry; Carotid stent; and Insert / replace / remove pacemaker.    Social History: Patient  reports that he has never smoked. He has never used smokeless tobacco. He reports that he does not drink alcohol and does not use drugs.    Family History:  Patient's family history has been reviewed and found to be noncontributory.     Allergies:      Codeine    Home Medications:    Prior to Admission Medications       None          ED Medications:    Medications   sodium chloride 0.9 % flush 10 mL (has no administration in time range)   Naloxone HCl (NARCAN) injection 2 mg (0 mg Intravenous Hold 5/9/24 0019)   dextrose (D50W) (25 g/50 mL) IV injection 50 mL (50 mL Intravenous Given 5/8/24 2340)   iopamidol (ISOVUE-300) 61 % injection 100 mL (100 mL Intravenous Given 5/9/24 0017)     Vital Signs:  Temp:  [97.7 °F (36.5 °C)] 97.7 °F (36.5 °C)  Heart Rate:  [75-78] 78  Resp:  [10] 10  BP: (101-111)/(50-52) 111/52    There were no vitals filed for this visit.  There is no height or weight on file to calculate BMI.    Physical Exam:     Most recent vital Signs: /52   Pulse 78   Temp 97.7 °F (36.5 °C)   Resp 10   SpO2 97%     Physical Exam  Constitutional:       General: He is not in acute distress.     Appearance: He is ill-appearing. He is not toxic-appearing.   HENT:      Mouth/Throat:      Mouth: Mucous membranes are moist.   Eyes:      Extraocular Movements: Extraocular movements intact.   Cardiovascular:      Rate and Rhythm: Normal rate and regular rhythm.      Pulses: Normal pulses.      Heart sounds: Normal heart sounds.   Pulmonary:      Effort: Pulmonary effort is normal.      Breath sounds: Normal breath sounds.   Abdominal:      Palpations: Abdomen is soft.      Tenderness: There is no abdominal tenderness.  "  Musculoskeletal:      Right lower leg: No edema.      Left lower leg: No edema.   Skin:     General: Skin is warm.      Capillary Refill: Capillary refill takes less than 2 seconds.   Neurological:      General: No focal deficit present.      Mental Status: He is alert and oriented to person, place, and time.   Psychiatric:         Mood and Affect: Mood normal.      Comments: sluggish         Laboratory data:    I have reviewed the labs done in the emergency room.    Results from last 7 days   Lab Units 05/08/24  2339   SODIUM mmol/L 139   POTASSIUM mmol/L 3.6   CHLORIDE mmol/L 104   CO2 mmol/L 25.6   BUN mg/dL 16   CREATININE mg/dL 1.01   CALCIUM mg/dL 9.0   BILIRUBIN mg/dL 0.4   ALK PHOS U/L 102   ALT (SGPT) U/L 13   AST (SGOT) U/L 13   GLUCOSE mg/dL 36*     Results from last 7 days   Lab Units 05/08/24  2339   WBC 10*3/mm3 5.89   HEMOGLOBIN g/dL 13.4   HEMATOCRIT % 38.4   PLATELETS 10*3/mm3 217     Results from last 7 days   Lab Units 05/08/24  2339   INR  1.11*     Results from last 7 days   Lab Units 05/08/24  2339   HSTROP T ng/L 59*                           Invalid input(s): \"USDES\", \"NITRITITE\", \"BACT\", \"EP\"    Pain Management Panel           No data to display                EKG:      EKG sinus rhythm, PVCs noted, nonspecific ST-T wave changes.     Radiology:    CT Angiogram Neck    Result Date: 5/9/2024  FINAL REPORT TECHNIQUE: null CLINICAL HISTORY: Stroke, follow up COMPARISON: null FINDINGS: CT angiography neck with contrast. 3D Postprocessing. Comparison: CT/SR - CT ANGIOGRAM HEAD W AI ANALYSIS FOR LVO - 05/08/2024 11:42 PM EDT CT/SR - CT HEAD WO CONTRAST STROKE PROTOCOL - 05/08/2024 11:42 PM EDT Findings: Aortic arch and cervical great vessels are patent. Calcified atherosclerotic disease and soft intimal plaque within the left common carotid artery resulting in less than 25% stenosis. Moderate atherosclerosis involving the bilateral carotid bulbs which extends into the proximal bilateral internal " carotid arteries without high-grade stenosis. Atherosclerosis involving the bilateral ICAs of the petrous segments and the cavernous segments without stenosis. Calcified atherosclerotic disease at the origin of the bilateral vertebral arteries without stenosis. Visualized intracranial arteries are patent. No aneurysm, dissection, or occlusion. No cervical mass or fluid collection. The left lobe of the thyroid is heterogeneous. Lung apices clear. No acute fracture. Multilevel degenerative changes of the cervical spine. Partially visualized median sternotomy wires.     IMPRESSION: 1. No occlusion or hemodynamically significant stenosis involving the neck vasculature. 2. Calcified atherosclerotic disease and soft intimal plaque involving the left common carotid artery resulting in less than 25% stenosis. 3. Moderate atherosclerosis involving the bilateral carotid bulbs which extends into the proximal bilateral internal carotid arteries without high-grade stenosis. Atherosclerosis involving the bilateral ICAs of the petrous segments and the cavernous segments without stenosis. 4. Calcified atherosclerotic disease of the origin of the bilateral vertebral arteries without high-grade stenosis. Authenticated and Electronically Signed by Malcolm Mccabe DO on 05/09/2024 12:39:56 AM    CT Angiogram Head w AI Analysis of LVO    Result Date: 5/9/2024  FINAL REPORT TECHNIQUE: null CLINICAL HISTORY: Stroke, follow up COMPARISON: null FINDINGS: CT angiography head with contrast. 3D Postprocessing. Comparison: None Findings: Intracranial arteries are patent. Moderate atherosclerosis bilateral ICAs at the level of the carotid siphons without flow-limiting stenosis No aneurysm, dissection, or occlusion. No abnormal intracranial enhancement. Subcortical/periventricular white matter hypoattenuation statistically representing changes of chronic microvascular ischemia. Global parenchymal volume loss which is commensurate with reported age.  No intracranial mass, midline shift, hydrocephalus, abnormal contrast enhancement, or acute hemorrhage. No skull fracture.     IMPRESSION: 1. No occlusion or hemodynamically significant stenosis involving the cerebral vasculature. Authenticated and Electronically Signed by Malcolm Mccabe DO on 05/09/2024 12:31:21 AM    CT CEREBRAL PERFUSION WITH & WITHOUT CONTRAST    Result Date: 5/9/2024  FINAL REPORT TECHNIQUE: null CLINICAL HISTORY: Neuro deficit, acute, stroke suspected COMPARISON: null FINDINGS: CT brain perfusion Comparison: None Findings: Perfusion maps are unremarkable without evidence of cerebral ischemia or core infarct. The cerebral blood flow of less than 30% is 0 mL. The T-max of greater than 6 seconds is 0 mL.     IMPRESSION: Normal brain CT perfusion. No evidence for core infarct or ischemia. Authenticated and Electronically Signed by Malcolm Mccabe DO on 05/09/2024 12:27:56 AM    CT Head Without Contrast Stroke Protocol    Addendum Date: 5/9/2024    ADDENDUM REPORT ADDENDUM: This report was discussed with Jason Taylor MD on May 09, 2024 00:20:00 EDT. Authenticated and Electronically Signed by Malcolm Mccabe DO on 05/09/2024 12:20:51 AM    Result Date: 5/9/2024  FINAL REPORT TECHNIQUE: null CLINICAL HISTORY: Neuro deficit, acute, stroke suspected COMPARISON: null FINDINGS: CT head without contrast Comparison: None Findings: No intracranial mass, midline shift, hydrocephalus, or acute hemorrhage. Subcortical/periventricular white matter hypoattenuation statistically representing changes of chronic microvascular ischemia. Global parenchymal volume loss which is commensurate with reported age. The visualized paranasal sinuses and mastoid air cells are normal. The orbits are unremarkable. No skull fracture.     IMPRESSION: 1. No acute intracranial process. 2. Subcortical/periventricular white matter hypoattenuation statistically representing changes of chronic microvascular ischemia. Global parenchymal  volume loss which is commensurate with reported age. Authenticated and Electronically Signed by Malcolm Mccabe DO on 05/09/2024 12:16:49 AM     Assessment/Plan:    Observation general floor admission early a.m. 5/9/2024 with altered mental status secondary to hypoglycemia, elevated troponin ACS not suspected likely demand ischemia.  Stroke alert called due to the severity of his unresponsiveness, CT imaging negative.      At time of admission comfortable in bed, sluggish yet pleasantly and appropriately conversational.  AO X3.    Attempted to contact wife Abbie on the phone, no answer.    Altered mental status  Hypoglycemia  D5 NS fluids.      Elevated troponin  ACS not suspected, likely demand ischemia.    Chronic:  Parkinson's disease, type 2 diabetes on insulin, coronary artery disease with history of CABG, hypertension, hyperlipidemia, atrial cardiac defibrillator and pacemaker, history of prostate cancer, history of laminectomies, spinal stenosis.      Correctional insulin.  Continue other home medications.    Risk Assessment: High  DVT Prophylaxis: Lovenox  Code Status: DNR/DNI  Diet: Cardiac/diabetic    Advance Care Planning  ACP discussion was held with the patient during this visit. Patient does not have an advance directive, information provided.           Brian Joseph Kerley, DO  05/09/24  01:31 EDT    Dictated utilizing Dragon dictation.    Electronically signed by Kerley, Brian Joseph, DO at 05/09/24 0207          Emergency Department Notes        Kaur Moss RN at 05/09/24 0514          Pt went into Svt for approx 30 seconds.EKG taken.  Dr. Kerley notified.     Electronically signed by Kaur Moss RN at 05/09/24 0514       Kaur Moss RN at 05/09/24 0304          Pt is much more alert and oriented at this time.  Pt asking for something to drink. Swallow screen redone. Pt now passes. Pt given orange juice.  Pt drank liquid with no difficulty.     Electronically signed by Kaur Moss RN at 05/09/24  0306       Kaur Moss RN at 24 2345          Pt taken to CT scan for with RN on Zoll monitor.     Electronically signed by Kaur Moss RN at 24 2350       Jason Taylor MD at 24 2338        Procedure Orders    1. Critical Care [[id number]] ordered by Jason Taylor MD                  EMERGENCY DEPARTMENT ENCOUNTER    Pt Name: Imtiaz Cabezas  MRN: [id number]  Pt :   1950  Room Number:    Date of encounter:  2024  PCP: Provider, No Known  ED Provider: Jason Taylor MD    Historian: EMS      HPI:  Chief Complaint: Found down        Context: Imtiaz Cabezas is a 73 y.o. male who presents to the ED c/o altered mental status/found down.  Patient had pushed his call button and was found unresponsive in his room only responding to painful stimuli.  Reportedly alert and oriented at baseline.  EMS report patient was intermittently hypotensive in route along with head what appeared to be A-fib on cardiac rhythm.  No medications given prior to arrival.      PAST MEDICAL HISTORY  Past Medical History:   Diagnosis Date    Arthritis     Coronary artery disease involving coronary bypass graft of native heart without angina pectoris 2016     bypass x3 in 2012 at [place] (LIMA to the left anterior descending, SVG to right coronary artery, SVG to OM), a stent to the right coronary artery in 2016    Diabetes     Heart attack     High blood pressure     Parkinson disease          PAST SURGICAL HISTORY  Past Surgical History:   Procedure Laterality Date    ARTERIAL BYPASS SURGERY      BACK SURGERY      CARDIAC SURGERY      CAROTID STENT      HIP SURGERY      INSERT / REPLACE / REMOVE PACEMAKER      PACEMAKER IMPLANTATION           FAMILY HISTORY  Family History   Problem Relation Age of Onset    Parkinsonism Other     Diabetes Other     Other Mother         mitral valve replacement         SOCIAL HISTORY  Social History     Socioeconomic History    Marital status:     Tobacco Use    Smoking status: Never    Smokeless tobacco: Never   Vaping Use    Vaping status: Never Used   Substance and Sexual Activity    Alcohol use: Never    Drug use: Never    Sexual activity: Defer         ALLERGIES  Codeine        REVIEW OF SYSTEMS  Review of Systems     All systems reviewed and negative except for those discussed in HPI.       PHYSICAL EXAM    I have reviewed the triage vital signs and nursing notes.    ED Triage Vitals   Temp Pulse Resp BP SpO2   -- -- -- -- --      Temp src Heart Rate Source Patient Position BP Location FiO2 (%)   -- -- -- -- --       Physical Exam    General: Minimally responsive, elderly, mouth held agape  HEENT: Atraumatic, normocephalic, PERRLA, mucous membranes moist  NECK:  Supple, atraumatic  Cardiovascular:  Regular rate, regular rhythm, no murmurs, rubs, or gallops.  Extremities well perfused   Respiratory:  Regular rate, clear lungs to auscultation bilaterally.  No rhonchi, rales, wheezing  Abdominal:  Soft, nondistended, nontender.  No guarding or rebound.  No palpable masses  Extremity:  No visible bony abnormalities in all 4 extremities.  Full range of motion of all extremities.  Skin:  Warm and dry.  No rashes  Neuro: Responds only to painful stimuli.  Appears to move right side less than left side but unable to fully examine based on mental status.  No verbal responses.          LAB RESULTS  Recent Results (from the past 24 hour(s))   POC Glucose Once    Collection Time: 05/08/24 11:37 PM    Specimen: Blood   Result Value Ref Range    Glucose 41 (C) 70 - 130 mg/dL   Protime-INR    Collection Time: 05/08/24 11:39 PM    Specimen: Blood   Result Value Ref Range    Protime 14.8 12.3 - 15.1 Seconds    INR 1.11 (H) 0.90 - 1.10   aPTT    Collection Time: 05/08/24 11:39 PM    Specimen: Blood   Result Value Ref Range    PTT 30.0 23.5 - 35.5 seconds   Single High Sensitivity Troponin T    Collection Time: 05/08/24 11:39 PM    Specimen: Blood   Result Value Ref  Range    HS Troponin T 59 (C) <22 ng/L   CBC Auto Differential    Collection Time: 05/08/24 11:39 PM    Specimen: Blood   Result Value Ref Range    WBC 5.89 3.40 - 10.80 10*3/mm3    RBC 4.29 4.14 - 5.80 10*6/mm3    Hemoglobin 13.4 13.0 - 17.7 g/dL    Hematocrit 38.4 37.5 - 51.0 %    MCV 89.5 79.0 - 97.0 fL    MCH 31.2 26.6 - 33.0 pg    MCHC 34.9 31.5 - 35.7 g/dL    RDW 12.7 12.3 - 15.4 %    RDW-SD 41.5 37.0 - 54.0 fl    MPV 9.7 6.0 - 12.0 fL    Platelets 217 140 - 450 10*3/mm3    Neutrophil % 53.5 42.7 - 76.0 %    Lymphocyte % 28.4 19.6 - 45.3 %    Monocyte % 11.4 5.0 - 12.0 %    Eosinophil % 5.3 0.3 - 6.2 %    Basophil % 1.2 0.0 - 1.5 %    Immature Grans % 0.2 0.0 - 0.5 %    Neutrophils, Absolute 3.16 1.70 - 7.00 10*3/mm3    Lymphocytes, Absolute 1.67 0.70 - 3.10 10*3/mm3    Monocytes, Absolute 0.67 0.10 - 0.90 10*3/mm3    Eosinophils, Absolute 0.31 0.00 - 0.40 10*3/mm3    Basophils, Absolute 0.07 0.00 - 0.20 10*3/mm3    Immature Grans, Absolute 0.01 0.00 - 0.05 10*3/mm3    nRBC 0.0 0.0 - 0.2 /100 WBC   Green Top (Gel)    Collection Time: 05/08/24 11:39 PM   Result Value Ref Range    Extra Tube Hold for add-ons.    Lavender Top    Collection Time: 05/08/24 11:39 PM   Result Value Ref Range    Extra Tube hold for add-on    Gold Top - SST    Collection Time: 05/08/24 11:39 PM   Result Value Ref Range    Extra Tube Hold for add-ons.    Light Blue Top    Collection Time: 05/08/24 11:39 PM   Result Value Ref Range    Extra Tube Hold for add-ons.    Comprehensive Metabolic Panel    Collection Time: 05/08/24 11:39 PM    Specimen: Blood   Result Value Ref Range    Glucose 36 (C) 65 - 99 mg/dL    BUN 16 8 - 23 mg/dL    Creatinine 1.01 0.76 - 1.27 mg/dL    Sodium 139 136 - 145 mmol/L    Potassium 3.6 3.5 - 5.2 mmol/L    Chloride 104 98 - 107 mmol/L    CO2 25.6 22.0 - 29.0 mmol/L    Calcium 9.0 8.6 - 10.5 mg/dL    Total Protein 6.6 6.0 - 8.5 g/dL    Albumin 4.0 3.5 - 5.2 g/dL    ALT (SGPT) 13 1 - 41 U/L    AST (SGOT) 13 1 -  40 U/L    Alkaline Phosphatase 102 39 - 117 U/L    Total Bilirubin 0.4 0.0 - 1.2 mg/dL    Globulin 2.6 gm/dL    A/G Ratio 1.5 g/dL    BUN/Creatinine Ratio 15.8 7.0 - 25.0    Anion Gap 9.4 5.0 - 15.0 mmol/L    eGFR 78.5 >60.0 mL/min/1.73   POC Glucose Once    Collection Time: 05/09/24 12:22 AM    Specimen: Blood   Result Value Ref Range    Glucose 120 70 - 130 mg/dL   POC Glucose Once    Collection Time: 05/09/24  1:29 AM    Specimen: Blood   Result Value Ref Range    Glucose 84 70 - 130 mg/dL       If labs were ordered, I independently reviewed the results and considered them in treating the patient.        RADIOLOGY  CT Angiogram Neck    Result Date: 5/9/2024  FINAL REPORT TECHNIQUE: null CLINICAL HISTORY: Stroke, follow up COMPARISON: null FINDINGS: CT angiography neck with contrast. 3D Postprocessing. Comparison: CT/SR - CT ANGIOGRAM HEAD W AI ANALYSIS FOR LVO - 05/08/2024 11:42 PM EDT CT/SR - CT HEAD WO CONTRAST STROKE PROTOCOL - 05/08/2024 11:42 PM EDT Findings: Aortic arch and cervical great vessels are patent. Calcified atherosclerotic disease and soft intimal plaque within the left common carotid artery resulting in less than 25% stenosis. Moderate atherosclerosis involving the bilateral carotid bulbs which extends into the proximal bilateral internal carotid arteries without high-grade stenosis. Atherosclerosis involving the bilateral ICAs of the petrous segments and the cavernous segments without stenosis. Calcified atherosclerotic disease at the origin of the bilateral vertebral arteries without stenosis. Visualized intracranial arteries are patent. No aneurysm, dissection, or occlusion. No cervical mass or fluid collection. The left lobe of the thyroid is heterogeneous. Lung apices clear. No acute fracture. Multilevel degenerative changes of the cervical spine. Partially visualized median sternotomy wires.     IMPRESSION: 1. No occlusion or hemodynamically significant stenosis involving the neck  vasculature. 2. Calcified atherosclerotic disease and soft intimal plaque involving the left common carotid artery resulting in less than 25% stenosis. 3. Moderate atherosclerosis involving the bilateral carotid bulbs which extends into the proximal bilateral internal carotid arteries without high-grade stenosis. Atherosclerosis involving the bilateral ICAs of the petrous segments and the cavernous segments without stenosis. 4. Calcified atherosclerotic disease of the origin of the bilateral vertebral arteries without high-grade stenosis. Authenticated and Electronically Signed by Malcolm Mccabe DO on 05/09/2024 12:39:56 AM    CT Angiogram Head w AI Analysis of LVO    Result Date: 5/9/2024  FINAL REPORT TECHNIQUE: null CLINICAL HISTORY: Stroke, follow up COMPARISON: null FINDINGS: CT angiography head with contrast. 3D Postprocessing. Comparison: None Findings: Intracranial arteries are patent. Moderate atherosclerosis bilateral ICAs at the level of the carotid siphons without flow-limiting stenosis No aneurysm, dissection, or occlusion. No abnormal intracranial enhancement. Subcortical/periventricular white matter hypoattenuation statistically representing changes of chronic microvascular ischemia. Global parenchymal volume loss which is commensurate with reported age. No intracranial mass, midline shift, hydrocephalus, abnormal contrast enhancement, or acute hemorrhage. No skull fracture.     IMPRESSION: 1. No occlusion or hemodynamically significant stenosis involving the cerebral vasculature. Authenticated and Electronically Signed by Malcolm Mccabe DO on 05/09/2024 12:31:21 AM    CT CEREBRAL PERFUSION WITH & WITHOUT CONTRAST    Result Date: 5/9/2024  FINAL REPORT TECHNIQUE: null CLINICAL HISTORY: Neuro deficit, acute, stroke suspected COMPARISON: null FINDINGS: CT brain perfusion Comparison: None Findings: Perfusion maps are unremarkable without evidence of cerebral ischemia or core infarct. The cerebral blood  flow of less than 30% is 0 mL. The T-max of greater than 6 seconds is 0 mL.     IMPRESSION: Normal brain CT perfusion. No evidence for core infarct or ischemia. Authenticated and Electronically Signed by Malcolm Mccabe DO on 05/09/2024 12:27:56 AM    CT Head Without Contrast Stroke Protocol    Addendum Date: 5/9/2024    ADDENDUM REPORT ADDENDUM: This report was discussed with Jason Taylor MD on May 09, 2024 00:20:00 EDT. Authenticated and Electronically Signed by Malcolm Mccabe DO on 05/09/2024 12:20:51 AM    Result Date: 5/9/2024  FINAL REPORT TECHNIQUE: null CLINICAL HISTORY: Neuro deficit, acute, stroke suspected COMPARISON: null FINDINGS: CT head without contrast Comparison: None Findings: No intracranial mass, midline shift, hydrocephalus, or acute hemorrhage. Subcortical/periventricular white matter hypoattenuation statistically representing changes of chronic microvascular ischemia. Global parenchymal volume loss which is commensurate with reported age. The visualized paranasal sinuses and mastoid air cells are normal. The orbits are unremarkable. No skull fracture.     IMPRESSION: 1. No acute intracranial process. 2. Subcortical/periventricular white matter hypoattenuation statistically representing changes of chronic microvascular ischemia. Global parenchymal volume loss which is commensurate with reported age. Authenticated and Electronically Signed by Malcolm Mccabe DO on 05/09/2024 12:16:49 AM     I ordered and independently reviewed the above noted radiographic studies.     See radiologist's dictation for official interpretation.        PROCEDURES    Critical Care    Performed by: Jason Taylor MD  Authorized by: Jason Taylor MD    Critical care provider statement:     Critical care time (minutes):  40    Critical care time was exclusive of:  Separately billable procedures and treating other patients and teaching time    Critical care was necessary to treat or prevent imminent or life-threatening  deterioration of the following conditions:  Shock, metabolic crisis and CNS failure or compromise    Critical care was time spent personally by me on the following activities:  Ordering and review of radiographic studies, ordering and review of laboratory studies, development of treatment plan with patient or surrogate, evaluation of patient's response to treatment, examination of patient, obtaining history from patient or surrogate, re-evaluation of patient's condition, pulse oximetry and ordering and performing treatments and interventions    I assumed direction of critical care for this patient from another provider in my specialty: no      Care discussed with: admitting provider        ECG 12 Lead Stroke Evaluation   Final Result          MEDICATIONS GIVEN IN ER    Medications   sodium chloride 0.9 % flush 10 mL (has no administration in time range)   Naloxone HCl (NARCAN) injection 2 mg (0 mg Intravenous Hold 5/9/24 0019)   dextrose (D50W) (25 g/50 mL) IV injection 50 mL (50 mL Intravenous Given 5/8/24 2340)   dextrose 5 % and sodium chloride 0.9 % infusion (has no administration in time range)   iopamidol (ISOVUE-300) 61 % injection 100 mL (100 mL Intravenous Given 5/9/24 0017)         MEDICAL DECISION MAKING, PROGRESS, and CONSULTS    All labs, if obtained, have been independently reviewed by me.  All radiology studies, if obtained, have been reviewed by me and the radiologist dictating the report.  All EKG's, if obtained, have been independently viewed and interpreted by me.      Discussion below represents my analysis of pertinent findings related to patient's condition, differential diagnosis, treatment plan and final disposition.    Imtiaz Cabezas is a 73 y.o. male who presents to the ED by EMS for altered mental status.  Significantly altered on arrival with no verbal response and only responds to painful stimuli.  GCS of 8 on arrival.  Brought in by EMS.  Differential includes was not limited to stroke,  cardiac arrhythmia, myocardial infarction, aspiration event, hypoglycemia, sepsis.  Extensive labs ordered along with stroke protocol CT and CTA imaging.  Critical point-of-care glucose on arrival of 41, for which patient was given D50 IV injection.    Chest x-ray personally reviewed and interpreted negative for evidence of acute infectious abnormality or pneumonia.  Mentation improving following D50.    CT head without contrast obtained which was negative for acute intracranial abnormality.  Upon further history reported from patient's wife the rehab facility/nursing home recently upped his insulin dosing, potentially causing tonight's hypoglycemic episode.    Patient still has generalized weakness on exam but no focal weakness noted.  Patient was able to wake up and talk to his wife at bedside and denies any current complaints other than feeling very tired.  Patient glucose started to slowly drop again so patient was started on low-dose D5 drip.  Patient to be admitted by hospitalist for further observation.  Patient's family agreeable with this plan.                                 Orders placed during this visit:  Orders Placed This Encounter   Procedures    Critical Care    CT Head Without Contrast Stroke Protocol    CT Angiogram Head w AI Analysis of LVO    CT Angiogram Neck    CT CEREBRAL PERFUSION WITH & WITHOUT CONTRAST    XR Chest 1 View    Urinalysis With Microscopic If Indicated (No Culture) - Urine, Catheter    [place] Draw    Protime-INR    aPTT    Single High Sensitivity Troponin T    CBC Auto Differential    Comprehensive Metabolic Panel    High Sensitivity Troponin T 2Hr    NPO Diet NPO Type: Strict NPO    Initiate Department's Acute Stroke Process (Team D, Code 19, etc.)    Perform NIH Stroke Scale    Measure Actual Weight    Notify Provider    Notify Provider for SBP Greater Than 140 for Hemorrhagic Stroke Patient    Head of Bed 30 Degrees or Less    Undress and Gown    Continuous Pulse Oximetry     Vital Signs    Neuro Checks    No Hypotonic Fluids    Nursing Dysphagia Screening (Complete Prior to Giving anything PO)    RN to Place Order SLP Consult (IF swallow screen failed) - Eval & Treat Choosing Reason of RN Dysphagia Screen Failed    Inpatient Neurology Consult Stroke    Inpatient Neurology Consult Stroke    Oxygen Therapy- Nasal Cannula; Titrate 1-6 LPM Per SpO2; 90 - 95%    SLP Consult: Eval & Treat RN Dysphagia Screen Failed    POC Glucose Once    POC Glucose STAT    POC Glucose STAT    POC Glucose Once    POC Glucose Once    ECG 12 Lead Stroke Evaluation    Insert Large-Bore Peripheral IV - RIGHT AC Preferred    Initiate Observation Status    CBC & Differential    Green Top (Gel)    Lavender Top    Gold Top - SST    Light Blue Top         ED Course:    Consultants:                  Shared Decision Making:  After my consideration of clinical presentation and any laboratory/radiology studies obtained, I discussed the findings with the patient/patient representative who is in agreement with the treatment plan and the final disposition.   Risks and benefits of discharge and/or observation/admission were discussed.      AS OF 01:36 EDT VITALS:    BP - 111/52  HR - 78  TEMP - 97.7 °F (36.5 °C)  O2 SATS - 97%                  DIAGNOSIS  Final diagnoses:   Hypoglycemia due to insulin   Metabolic encephalopathy   Generalized weakness         DISPOSITION  Admit      Please note that portions of this document were completed with voice recognition software.        Jason Taylor MD  05/09/24 0136      Electronically signed by Jason Taylor MD at 05/09/24 0136       Vital Signs (last day)       Date/Time Temp Temp src Pulse Resp BP Patient Position SpO2    05/09/24 0700 -- -- 84 -- 133/89 -- 98    05/09/24 0630 -- -- 81 -- 136/75 -- 97    05/09/24 0528 -- -- 84 -- 114/59 -- 99    05/09/24 0507 -- -- 152 -- -- -- 96    05/09/24 0434 -- -- 87 -- 104/59 -- 97    05/09/24 0430 -- -- 78 -- 87/47 -- 95     05/09/24 0415 -- -- 92 -- 92/42 -- 97    05/09/24 0358 -- -- 86 -- 109/52 -- 96    05/09/24 0343 -- -- 78 -- 130/54 -- 99    05/09/24 0235 -- -- 81 -- -- -- 98    05/09/24 0230 -- -- 86 -- 114/64 -- 96    05/09/24 0225 -- -- 83 -- -- -- 99    05/09/24 0220 -- -- 87 -- -- -- 99    05/09/24 0215 -- -- 83 -- 107/57 -- 99    05/09/24 0210 -- -- 81 -- -- -- 97    05/09/24 0205 -- -- 82 -- -- -- 97    05/09/24 0200 -- -- 76 -- 127/66 -- 99    05/09/24 0155 -- -- 83 -- -- -- 99    05/09/24 0150 -- -- 77 -- -- -- 98    05/09/24 0145 -- -- 80 -- -- -- 95    05/09/24 0144 -- -- 86 -- 125/70 -- 99    05/09/24 0140 -- -- 76 -- -- -- 100    05/09/24 0135 -- -- 77 -- -- -- 97    05/09/24 0130 -- -- 64 -- 124/62 -- 99    05/09/24 0125 -- -- 79 -- -- -- 100    05/09/24 0120 -- -- 71 -- -- -- 97    05/09/24 0115 -- -- 64 -- 103/61 -- 95    05/09/24 0110 -- -- 87 -- -- -- 98    05/09/24 0105 -- -- 78 -- -- -- 99    05/09/24 0100 -- -- 86 -- 102/48 -- 99    05/09/24 0055 -- -- 76 -- -- -- 100    05/09/24 0050 -- -- 74 -- -- -- 93    05/09/24 0045 -- -- 76 -- -- -- 97    05/09/24 0044 -- -- 78 -- 111/52 -- 97    05/09/24 0040 -- -- 76 -- -- -- 99    05/09/24 0035 -- -- 71 -- -- -- 96    05/09/24 0030 -- -- 87 -- 103/60 -- 99    05/09/24 0025 97.7 (36.5) -- 73 -- -- -- 100    05/09/24 0020 -- -- 80 -- -- -- 100    05/09/24 0015 -- -- 75 -- 104/53 -- 97    05/09/24 0010 -- -- 75 -- -- -- 97    05/09/24 0005 -- -- 75 -- -- -- 96    05/09/24 0002 -- -- 75 -- 120/63 -- 95    05/08/24 2349 -- -- -- 10 -- -- --    05/08/24 2340 -- -- 87 -- -- -- 96    05/08/24 2339 -- -- 75 -- 101/50 -- 98    05/08/24 2337 -- -- 75 -- -- -- --    05/08/24 2336 -- -- -- -- -- -- 100    05/08/24 2335 -- -- -- -- 99/57 -- --          Current Facility-Administered Medications   Medication Dose Route Frequency Provider Last Rate Last Admin    acetaminophen (TYLENOL) tablet 650 mg  650 mg Oral Q4H PRN Kerley, Brian Joseph, DO        Or    acetaminophen (TYLENOL)  160 MG/5ML oral solution 650 mg  650 mg Oral Q4H PRN Kerley, Brian Joseph, DO        Or    acetaminophen (TYLENOL) suppository 650 mg  650 mg Rectal Q4H PRN Kerley, Brian Joseph, DO        albuterol sulfate HFA (PROVENTIL HFA;VENTOLIN HFA;PROAIR HFA) inhaler 2 puff  2 puff Inhalation Q4H PRN Kerley, Brian Joseph, DO        amantadine (SYMMETREL) capsule 100 mg  100 mg Oral BID Kerley, Brian Joseph, DO        sennosides-docusate (PERICOLACE) 8.6-50 MG per tablet 2 tablet  2 tablet Oral BID PRN Kerley, Brian Joseph, DO        And    polyethylene glycol (MIRALAX) packet 17 g  17 g Oral Daily PRN Kerley, Brian Joseph, DO        And    bisacodyl (DULCOLAX) EC tablet 5 mg  5 mg Oral Daily PRN Kerley, Brian Joseph, DO        And    bisacodyl (DULCOLAX) suppository 10 mg  10 mg Rectal Daily PRN Kerley, Brian Joseph, DO        Calcium Replacement - Follow Nurse / BPA Driven Protocol   Does not apply PRN Kerley, Brian Joseph, DO        clopidogrel (PLAVIX) tablet 75 mg  75 mg Oral Daily Kerley, Brian Joseph, DO        dextrose (D50W) (25 g/50 mL) IV injection 25 g  25 g Intravenous Q15 Min PRN Kerley, Brian Joseph, DO        dextrose (D50W) (25 g/50 mL) IV injection 50 mL  50 mL Intravenous Q1H PRN Kerley, Brian Joseph, DO   50 mL at 05/08/24 2340    dextrose (GLUTOSE) oral gel 15 g  15 g Oral Q15 Min PRN Kerley, Brian Joseph, DO        dextrose 5 % and sodium chloride 0.9 % infusion  75 mL/hr Intravenous Continuous Kerley, Brian Joseph, DO 75 mL/hr at 05/09/24 0501 75 mL/hr at 05/09/24 0501    dilTIAZem CD (CARDIZEM CD) 24 hr capsule 360 mg  360 mg Oral Daily PRN Kerley, Brian Joseph, DO        Enoxaparin Sodium (LOVENOX) syringe 40 mg  40 mg Subcutaneous Daily Kerley, Brian Joseph, DO   40 mg at 05/09/24 0253    glucagon (GLUCAGEN) injection 1 mg  1 mg Intramuscular Q15 Min PRN Kerley, Brian Joseph,         glycopyrrolate (ROBINUL) tablet 1 mg  1 mg Oral Daily PRN Kerley, Brian Joseph,         Insulin Lispro (humaLOG)  injection 3-14 Units  3-14 Units Subcutaneous 4x Daily AC & at Bedtime Kerley, Brian Joseph,         lisinopril (PRINIVIL,ZESTRIL) tablet 2.5 mg  2.5 mg Oral Daily Kerley, Brian Joseph,         Magnesium Standard Dose Replacement - Follow Nurse / BPA Driven Protocol   Does not apply PRN Kerley, Brian Joseph, DO        Naloxone HCl (NARCAN) injection 2 mg  2 mg Intravenous Once Kerley, Brian Joseph, DO        nitroglycerin (NITROSTAT) SL tablet 0.4 mg  0.4 mg Sublingual Q5 Min PRN Kerley, Brian Joseph, DO        ondansetron (ZOFRAN) injection 4 mg  4 mg Intravenous Q6H PRN Kerley, Brian Joseph,         Phosphorus Replacement - Follow Nurse / BPA Driven Protocol   Does not apply PRN Kerley, Brian Joseph,         Potassium Replacement - Follow Nurse / BPA Driven Protocol   Does not apply PRN Kerley, Brian Joseph, DO        pramipexole (MIRAPEX) tablet 0.5 mg  0.5 mg Oral TID Kerley, Brian Joseph,         sodium chloride 0.9 % flush 10 mL  10 mL Intravenous PRN Kerley, Brian Joseph,         sodium chloride 0.9 % flush 10 mL  10 mL Intravenous Q12H Kerley, Brian Joseph,         sodium chloride 0.9 % flush 10 mL  10 mL Intravenous PRN Kerley, Brian Joseph,         sodium chloride 0.9 % infusion 40 mL  40 mL Intravenous PRN Kerley, Brian Joseph, DO        tamsulosin (FLOMAX) 24 hr capsule 0.4 mg  0.4 mg Oral Daily Kerley, Brian Joseph, DO        traMADol (ULTRAM) tablet 50 mg  50 mg Oral Q12H PRN Kerley, Brian Joseph,          Current Outpatient Medications   Medication Sig Dispense Refill    amantadine (SYMMETREL) 100 MG capsule Take 1 capsule by mouth 2 (Two) Times a Day.      clopidogrel (PLAVIX) 75 MG tablet Take 1 tablet by mouth Daily.      hydrOXYzine (ATARAX) 10 MG tablet Take 1 tablet by mouth 2 (Two) Times a Day.      insulin detemir (LEVEMIR) 100 UNIT/ML injection Inject 28 Units under the skin into the appropriate area as directed Every Night. Just moved up to 28 units from 24 units on 5/8       insulin regular (humuLIN R,novoLIN R) 100 UNIT/ML injection Inject  under the skin into the appropriate area as directed 3 (Three) Times a Day Before Meals. Sliding scale      latanoprost (XALATAN) 0.005 % ophthalmic solution Administer 1 drop to both eyes Every Night.      lisinopril (PRINIVIL,ZESTRIL) 2.5 MG tablet Take 1 tablet by mouth Daily.      mirtazapine (REMERON SOL-TAB) 30 MG disintegrating tablet Place 1 tablet on the tongue Every Night.      polyethylene glycol (MiraLax) 17 g packet Take 17 g by mouth Daily.      pramipexole (MIRAPEX) 0.5 MG tablet Take 1 tablet by mouth 3 (Three) Times a Day.      QUEtiapine (SEROquel) 100 MG tablet Take 1 tablet by mouth Every Night.      tamsulosin (FLOMAX) 0.4 MG capsule 24 hr capsule Take 1 capsule by mouth Daily.      vitamin D3 125 MCG (5000 UT) capsule capsule Take 1 capsule by mouth Daily.      albuterol sulfate  (90 Base) MCG/ACT inhaler Inhale 2 puffs Every 4 (Four) Hours As Needed for Wheezing.      dilTIAZem CD (CARDIZEM CD) 120 MG 24 hr capsule Take 3 capsules by mouth Daily As Needed (For BP >150/90).      glycopyrrolate (ROBINUL) 1 MG tablet Take 1 tablet by mouth Daily As Needed (prn).      traMADol (ULTRAM) 50 MG tablet Take 1 tablet by mouth Every 12 (Twelve) Hours As Needed for Moderate Pain.       Lab Results (last 24 hours)       Procedure Component Value Units Date/Time    CBC Auto Differential [[id number]]  (Abnormal) Collected: 05/09/24 045    Specimen: Blood Updated: 05/09/24 0506     WBC 5.94 10*3/mm3      RBC 3.97 10*6/mm3      Hemoglobin 12.4 g/dL      Hematocrit 35.8 %      MCV 90.2 fL      MCH 31.2 pg      MCHC 34.6 g/dL      RDW 12.8 %      RDW-SD 42.3 fl      MPV 9.7 fL      Platelets 184 10*3/mm3      Neutrophil % 66.2 %      Lymphocyte % 19.0 %      Monocyte % 9.4 %      Eosinophil % 3.9 %      Basophil % 1.2 %      Immature Grans % 0.3 %      Neutrophils, Absolute 3.93 10*3/mm3      Lymphocytes, Absolute 1.13 10*3/mm3       Monocytes, Absolute 0.56 10*3/mm3      Eosinophils, Absolute 0.23 10*3/mm3      Basophils, Absolute 0.07 10*3/mm3      Immature Grans, Absolute 0.02 10*3/mm3      nRBC 0.0 /100 WBC     POC Glucose Once [[id number]]  (Normal) Collected: 05/09/24 0433    Specimen: Blood Updated: 05/09/24 0435     Glucose 93 mg/dL      Comment: Serial Number: [id number]:  [email or web address]       High Sensitivity Troponin T 2Hr [[id number]]  (Abnormal) Collected: 05/09/24 0313    Specimen: Blood Updated: 05/09/24 0342     HS Troponin T 49 ng/L      Troponin T Delta -10 ng/L     Narrative:      High Sensitive Troponin T Reference Range:  <14.0 ng/L- Negative Female for AMI  <22.0 ng/L- Negative Male for AMI  >=14 - Abnormal Female indicating possible myocardial injury.  >=22 - Abnormal Male indicating possible myocardial injury.   Clinicians would have to utilize clinical acumen, EKG, Troponin, and serial changes to determine if it is an Acute Myocardial Infarction or myocardial injury due to an underlying chronic condition.         Basic Metabolic Panel [[id number]]  (Normal) Collected: 05/09/24 0313    Specimen: Blood Updated: 05/09/24 0340     Glucose 94 mg/dL      BUN 14 mg/dL      Creatinine 0.93 mg/dL      Sodium 141 mmol/L      Potassium 3.8 mmol/L      Chloride 106 mmol/L      CO2 24.7 mmol/L      Calcium 8.7 mg/dL      BUN/Creatinine Ratio 15.1     Anion Gap 10.3 mmol/L      eGFR 86.7 mL/min/1.73     Narrative:      GFR Normal >60  Chronic Kidney Disease <60  Kidney Failure <15    The GFR formula is only valid for adults with stable renal function between ages 18 and 70.    POC Glucose STAT [[id number]]  (Normal) Resulted: 05/09/24 0303    Specimen: Blood Updated: 05/09/24 0303     Glucose 81 mg/dL     POC Glucose STAT [[id number]]  (Normal) Resulted: 05/09/24 0302    Specimen: Blood Updated: 05/09/24 0303     Glucose 81 mg/dL     POC Glucose Once [[id number]]  (Normal) Collected: 05/09/24 0251    Specimen: Blood Updated: 05/09/24 0253      Glucose 81 mg/dL      Comment: Serial Number: [id number]:  [email or web address]       POC Glucose Once [[id number]]  (Normal) Collected: 05/09/24 0129    Specimen: Blood Updated: 05/09/24 0130     Glucose 84 mg/dL      Comment: Serial Number: [id number]:  [email or web address]       Comprehensive Metabolic Panel [[id number]]  (Abnormal) Collected: 05/08/24 2339    Specimen: Blood Updated: 05/09/24 0046     Glucose 36 mg/dL      BUN 16 mg/dL      Creatinine 1.01 mg/dL      Sodium 139 mmol/L      Potassium 3.6 mmol/L      Chloride 104 mmol/L      CO2 25.6 mmol/L      Calcium 9.0 mg/dL      Total Protein 6.6 g/dL      Albumin 4.0 g/dL      ALT (SGPT) 13 U/L      AST (SGOT) 13 U/L      Alkaline Phosphatase 102 U/L      Total Bilirubin 0.4 mg/dL      Globulin 2.6 gm/dL      A/G Ratio 1.5 g/dL      BUN/Creatinine Ratio 15.8     Anion Gap 9.4 mmol/L      eGFR 78.5 mL/min/1.73     Narrative:      GFR Normal >60  Chronic Kidney Disease <60  Kidney Failure <15    The GFR formula is only valid for adults with stable renal function between ages 18 and 70.    Single High Sensitivity Troponin T [[id number]]  (Abnormal) Collected: 05/08/24 2339    Specimen: Blood Updated: 05/09/24 0046     HS Troponin T 59 ng/L     Narrative:      High Sensitive Troponin T Reference Range:  <14.0 ng/L- Negative Female for AMI  <22.0 ng/L- Negative Male for AMI  >=14 - Abnormal Female indicating possible myocardial injury.  >=22 - Abnormal Male indicating possible myocardial injury.   Clinicians would have to utilize clinical acumen, EKG, Troponin, and serial changes to determine if it is an Acute Myocardial Infarction or myocardial injury due to an underlying chronic condition.         POC Glucose Once [[id number]]  (Normal) Collected: 05/09/24 0022    Specimen: Blood Updated: 05/09/24 0026     Glucose 120 mg/dL      Comment: Serial Number: [id number]:  [email or web address]       Protime-INR [[id number]]  (Abnormal) Collected: 05/08/24 2339    Specimen: Blood  Updated: 05/09/24 0003     Protime 14.8 Seconds      INR 1.11    Narrative:      Suggested INR therapeutic range for stable oral anticoagulant therapy:    Low Intensity therapy:   1.5-2.0  Moderate Intensity therapy:   2.0-3.0  High Intensity therapy:   2.5-4.0    aPTT [[id number]]  (Normal) Collected: 05/08/24 2339    Specimen: Blood Updated: 05/09/24 0003     PTT 30.0 seconds     CBC & Differential [[id number]]  (Normal) Collected: 05/08/24 2339    Specimen: Blood Updated: 05/08/24 2348    Narrative:      The following orders were created for panel order CBC & Differential.  Procedure                               Abnormality         Status                     ---------                               -----------         ------                     CBC Auto Differential[[id number]]        Normal              Final result                 Please view results for these tests on the individual orders.    CBC Auto Differential [[id number]]  (Normal) Collected: 05/08/24 2339    Specimen: Blood Updated: 05/08/24 2348     WBC 5.89 10*3/mm3      RBC 4.29 10*6/mm3      Hemoglobin 13.4 g/dL      Hematocrit 38.4 %      MCV 89.5 fL      MCH 31.2 pg      MCHC 34.9 g/dL      RDW 12.7 %      RDW-SD 41.5 fl      MPV 9.7 fL      Platelets 217 10*3/mm3      Neutrophil % 53.5 %      Lymphocyte % 28.4 %      Monocyte % 11.4 %      Eosinophil % 5.3 %      Basophil % 1.2 %      Immature Grans % 0.2 %      Neutrophils, Absolute 3.16 10*3/mm3      Lymphocytes, Absolute 1.67 10*3/mm3      Monocytes, Absolute 0.67 10*3/mm3      Eosinophils, Absolute 0.31 10*3/mm3      Basophils, Absolute 0.07 10*3/mm3      Immature Grans, Absolute 0.01 10*3/mm3      nRBC 0.0 /100 WBC     [place] Draw [[id number]] Collected: 05/08/24 2339    Specimen: Blood Updated: 05/08/24 2345    Narrative:      The following orders were created for panel order [place] Draw.  Procedure                               Abnormality         Status                     ---------                                -----------         ------                     Green Top (Gel)[[id number]]                                  Final result               Lavender Top[[id number]]                                     Final result               Gold Top - SST[[id number]]                                   Final result               Light Blue Top[[id number]]                                   Final result                 Please view results for these tests on the individual orders.    Green Top (Gel) [[id number]] Collected: 05/08/24 2339    Specimen: Blood Updated: 05/08/24 2345     Extra Tube Hold for add-ons.     Comment: Auto resulted.       Lavender Top [[id number]] Collected: 05/08/24 2339    Specimen: Blood Updated: 05/08/24 2345     Extra Tube hold for add-on     Comment: Auto resulted       Gold Top - SST [[id number]] Collected: 05/08/24 2339    Specimen: Blood Updated: 05/08/24 2345     Extra Tube Hold for add-ons.     Comment: Auto resulted.       Light Blue Top [[id number]] Collected: 05/08/24 2339    Specimen: Blood Updated: 05/08/24 2345     Extra Tube Hold for add-ons.     Comment: Auto resulted       POC Glucose Once [[id number]]  (Abnormal) Collected: 05/08/24 2337    Specimen: Blood Updated: 05/08/24 2340     Glucose 41 mg/dL      Comment: Serial Number: [id number]:  [id number]             Imaging Results (Last 24 Hours)       Procedure Component Value Units Date/Time    CT Angiogram Neck [[id number]] Collected: 05/09/24 0039     Updated: 05/09/24 0041    Narrative:      FINAL REPORT    TECHNIQUE:  null    CLINICAL HISTORY:  Stroke, follow up    COMPARISON:  null    FINDINGS:  CT angiography neck with contrast. 3D Postprocessing.    Comparison: CT/SR - CT ANGIOGRAM HEAD W AI ANALYSIS FOR LVO - 05/08/2024 11:42 PM EDT    CT/SR - CT HEAD WO CONTRAST STROKE PROTOCOL - 05/08/2024 11:42 PM EDT    Findings:    Aortic arch and cervical great vessels are patent.    Calcified atherosclerotic disease and soft intimal  plaque within the left common carotid artery resulting in less than 25% stenosis.    Moderate atherosclerosis involving the bilateral carotid bulbs which extends into the proximal bilateral internal carotid arteries without high-grade stenosis. Atherosclerosis involving the bilateral ICAs of the petrous segments and the cavernous   segments without stenosis.    Calcified atherosclerotic disease at the origin of the bilateral vertebral arteries without stenosis.    Visualized intracranial arteries are patent.    No aneurysm, dissection, or occlusion.    No cervical mass or fluid collection.    The left lobe of the thyroid is heterogeneous.    Lung apices clear.    No acute fracture. Multilevel degenerative changes of the cervical spine.    Partially visualized median sternotomy wires.      Impression:      IMPRESSION:    1. No occlusion or hemodynamically significant stenosis involving the neck vasculature.    2. Calcified atherosclerotic disease and soft intimal plaque involving the left common carotid artery resulting in less than 25% stenosis.    3. Moderate atherosclerosis involving the bilateral carotid bulbs which extends into the proximal bilateral internal carotid arteries without high-grade stenosis. Atherosclerosis involving the bilateral ICAs of the petrous segments and the cavernous   segments without stenosis.    4. Calcified atherosclerotic disease of the origin of the bilateral vertebral arteries without high-grade stenosis.    Authenticated and Electronically Signed by Malcolm Mccabe DO on  05/09/2024 12:39:56 AM    CT Angiogram Head w AI Analysis of LVO [[id number]] Collected: 05/09/24 0031     Updated: 05/09/24 0032    Narrative:      FINAL REPORT    TECHNIQUE:  null    CLINICAL HISTORY:  Stroke, follow up    COMPARISON:  null    FINDINGS:  CT angiography head with contrast. 3D Postprocessing.    Comparison: None    Findings:    Intracranial arteries are patent.    Moderate atherosclerosis bilateral  ICAs at the level of the carotid siphons without flow-limiting stenosis    No aneurysm, dissection, or occlusion.    No abnormal intracranial enhancement. Subcortical/periventricular white matter hypoattenuation statistically representing changes of chronic microvascular ischemia. Global parenchymal volume loss which is commensurate with reported age.    No intracranial mass, midline shift, hydrocephalus, abnormal contrast enhancement, or acute hemorrhage.    No skull fracture.      Impression:      IMPRESSION:    1. No occlusion or hemodynamically significant stenosis involving the cerebral vasculature.    Authenticated and Electronically Signed by Malcolm Mccabe DO on  05/09/2024 12:31:21 AM    CT CEREBRAL PERFUSION WITH & WITHOUT CONTRAST [[id number]] Collected: 05/09/24 0027     Updated: 05/09/24 0029    Narrative:      FINAL REPORT    TECHNIQUE:  null    CLINICAL HISTORY:  Neuro deficit, acute, stroke suspected    COMPARISON:  null    FINDINGS:  CT brain perfusion    Comparison: None    Findings:    Perfusion maps are unremarkable without evidence of cerebral ischemia or core infarct.    The cerebral blood flow of less than 30% is 0 mL.    The T-max of greater than 6 seconds is 0 mL.      Impression:      IMPRESSION:    Normal brain CT perfusion. No evidence for core infarct or ischemia.    Authenticated and Electronically Signed by Malcolm Mccabe DO on  05/09/2024 12:27:56 AM    CT Head Without Contrast Stroke Protocol [[id number]] Collected: 05/09/24 0016     Updated: 05/09/24 0022    Addenda:        ADDENDUM REPORT    ADDENDUM:  This report was discussed with Jason Taylor MD on May 09, 2024 00:20:00   EDT.    Authenticated and Electronically Signed by Malcolm Mccabe DO on  05/09/2024 12:20:51 AM  Signed: 05/09/24 0020 by Malcolm Mccabe DO    Narrative:      FINAL REPORT    TECHNIQUE:  null    CLINICAL HISTORY:  Neuro deficit, acute, stroke suspected    COMPARISON:  null    FINDINGS:  CT head without  contrast    Comparison: None    Findings:    No intracranial mass, midline shift, hydrocephalus, or acute hemorrhage.    Subcortical/periventricular white matter hypoattenuation statistically representing changes of chronic microvascular ischemia. Global parenchymal volume loss which is commensurate with reported age.    The visualized paranasal sinuses and mastoid air cells are normal.    The orbits are unremarkable.    No skull fracture.      Impression:      IMPRESSION:    1. No acute intracranial process.    2. Subcortical/periventricular white matter hypoattenuation statistically representing changes of chronic microvascular ischemia. Global parenchymal volume loss which is commensurate with reported age.    Authenticated and Electronically Signed by Malcolm Mccabe DO on  05/09/2024 12:16:49 AM    XR Chest 1 View [[id number]] Resulted: 05/09/24 0010     Updated: 05/09/24 0013          Physician Progress Notes (last 24 hours)  Notes from 05/08/24 0728 through 05/09/24 0728   No notes of this type exist for this encounter.       Consult Notes (last 24 hours)  Notes from 05/08/24 0728 through 05/09/24 0728   No notes of this type exist for this encounter.

## 2024-05-09 NOTE — ED NOTES
Pt is much more alert and oriented at this time.  Pt asking for something to drink. Swallow screen redone. Pt now passes. Pt given orange juice.  Pt drank liquid with no difficulty.

## 2024-05-09 NOTE — H&P
[facility]   HISTORY AND PHYSICAL      Name:  Imtiaz Cabezas   Age:  73 y.o.  Sex:  male  :  1950  MRN:  [id number]   Visit Number:  [id number]  Admission Date:  2024  Date Of Service:  24  Primary Care Physician:  Provider, No Known    Chief Complaint:     Found down    History Of Presenting Illness:      Imtiaz Cabzeas is a 73-year-old man, nursing home resident, with past medical history of Parkinson's disease, type 2 diabetes on insulin, coronary artery disease with history of CABG, hypertension, hyperlipidemia, atrial cardiac defibrillator and pacemaker, history of prostate cancer, history of laminectomies, spinal stenosis.  Presented to [facility] ED 2024 with concern for altered mental status, found down.  Reportedly was unresponsive, only responding to painful stimuli.  At baseline he is alert and oriented.  EMS reported intermittently hypotensive en route, appeared to be in atrial fibrillation on cardiac rhythm.  No medications given prior to arrival.    ED summary: Afebrile, vital signs stable on room air.  EKG sinus rhythm, PVCs noted, nonspecific ST-T wave changes.  High-sensitivity troponin 59, ACS not suspected.  Patient was unresponsive at presentation.  Blood glucose as low as 36.  Blood work otherwise unremarkable.  Stroke alert out of an abundance of caution.  CT angio head negative.  CT angio neck some stenosis, carotid artery, carotid bulbs atherosclerosis, bilateral vertebral arteries sclerosis without high-grade stenosis.  CT head no acute intracranial process, chronic microvascular ischemia present.  CT cerebral perfusion unremarkable.  He was given amp of D50, started on D5NS fluids.  Reportedly woke up and asked his wife to cover up his arm because he was cold.    Review Of Systems:    All systems were reviewed and negative except as mentioned in history of presenting illness, assessment and plan.    Past Medical History: Patient  has a past medical  history of Arthritis, Coronary artery disease involving coronary bypass graft of native heart without angina pectoris (1/1/2016), Diabetes, Heart attack, High blood pressure, and Parkinson disease.    Past Surgical History: Patient  has a past surgical history that includes Back surgery; Cardiac surgery; Hip surgery; Pacemaker Implantation; Arterial bypass surgry; Carotid stent; and Insert / replace / remove pacemaker.    Social History: Patient  reports that he has never smoked. He has never used smokeless tobacco. He reports that he does not drink alcohol and does not use drugs.    Family History:  Patient's family history has been reviewed and found to be noncontributory.     Allergies:      Codeine    Home Medications:    Prior to Admission Medications       None          ED Medications:    Medications   sodium chloride 0.9 % flush 10 mL (has no administration in time range)   Naloxone HCl (NARCAN) injection 2 mg (0 mg Intravenous Hold 5/9/24 0019)   dextrose (D50W) (25 g/50 mL) IV injection 50 mL (50 mL Intravenous Given 5/8/24 2340)   iopamidol (ISOVUE-300) 61 % injection 100 mL (100 mL Intravenous Given 5/9/24 0017)     Vital Signs:  Temp:  [97.7 °F (36.5 °C)] 97.7 °F (36.5 °C)  Heart Rate:  [75-78] 78  Resp:  [10] 10  BP: (101-111)/(50-52) 111/52    There were no vitals filed for this visit.  There is no height or weight on file to calculate BMI.    Physical Exam:     Most recent vital Signs: /52   Pulse 78   Temp 97.7 °F (36.5 °C)   Resp 10   SpO2 97%     Physical Exam  Constitutional:       General: He is not in acute distress.     Appearance: He is ill-appearing. He is not toxic-appearing.   HENT:      Mouth/Throat:      Mouth: Mucous membranes are moist.   Eyes:      Extraocular Movements: Extraocular movements intact.   Cardiovascular:      Rate and Rhythm: Normal rate and regular rhythm.      Pulses: Normal pulses.      Heart sounds: Normal heart sounds.   Pulmonary:      Effort: Pulmonary  "effort is normal.      Breath sounds: Normal breath sounds.   Abdominal:      Palpations: Abdomen is soft.      Tenderness: There is no abdominal tenderness.   Musculoskeletal:      Right lower leg: No edema.      Left lower leg: No edema.   Skin:     General: Skin is warm.      Capillary Refill: Capillary refill takes less than 2 seconds.   Neurological:      General: No focal deficit present.      Mental Status: He is alert and oriented to person, place, and time.   Psychiatric:         Mood and Affect: Mood normal.      Comments: sluggish         Laboratory data:    I have reviewed the labs done in the emergency room.    Results from last 7 days   Lab Units 05/08/24  2339   SODIUM mmol/L 139   POTASSIUM mmol/L 3.6   CHLORIDE mmol/L 104   CO2 mmol/L 25.6   BUN mg/dL 16   CREATININE mg/dL 1.01   CALCIUM mg/dL 9.0   BILIRUBIN mg/dL 0.4   ALK PHOS U/L 102   ALT (SGPT) U/L 13   AST (SGOT) U/L 13   GLUCOSE mg/dL 36*     Results from last 7 days   Lab Units 05/08/24  2339   WBC 10*3/mm3 5.89   HEMOGLOBIN g/dL 13.4   HEMATOCRIT % 38.4   PLATELETS 10*3/mm3 217     Results from last 7 days   Lab Units 05/08/24  2339   INR  1.11*     Results from last 7 days   Lab Units 05/08/24  2339   HSTROP T ng/L 59*                           Invalid input(s): \"USDES\", \"NITRITITE\", \"BACT\", \"EP\"    Pain Management Panel           No data to display                EKG:      EKG sinus rhythm, PVCs noted, nonspecific ST-T wave changes.     Radiology:    CT Angiogram Neck    Result Date: 5/9/2024  FINAL REPORT TECHNIQUE: null CLINICAL HISTORY: Stroke, follow up COMPARISON: null FINDINGS: CT angiography neck with contrast. 3D Postprocessing. Comparison: CT/SR - CT ANGIOGRAM HEAD W AI ANALYSIS FOR LVO - 05/08/2024 11:42 PM EDT CT/SR - CT HEAD WO CONTRAST STROKE PROTOCOL - 05/08/2024 11:42 PM EDT Findings: Aortic arch and cervical great vessels are patent. Calcified atherosclerotic disease and soft intimal plaque within the left common carotid " artery resulting in less than 25% stenosis. Moderate atherosclerosis involving the bilateral carotid bulbs which extends into the proximal bilateral internal carotid arteries without high-grade stenosis. Atherosclerosis involving the bilateral ICAs of the petrous segments and the cavernous segments without stenosis. Calcified atherosclerotic disease at the origin of the bilateral vertebral arteries without stenosis. Visualized intracranial arteries are patent. No aneurysm, dissection, or occlusion. No cervical mass or fluid collection. The left lobe of the thyroid is heterogeneous. Lung apices clear. No acute fracture. Multilevel degenerative changes of the cervical spine. Partially visualized median sternotomy wires.     IMPRESSION: 1. No occlusion or hemodynamically significant stenosis involving the neck vasculature. 2. Calcified atherosclerotic disease and soft intimal plaque involving the left common carotid artery resulting in less than 25% stenosis. 3. Moderate atherosclerosis involving the bilateral carotid bulbs which extends into the proximal bilateral internal carotid arteries without high-grade stenosis. Atherosclerosis involving the bilateral ICAs of the petrous segments and the cavernous segments without stenosis. 4. Calcified atherosclerotic disease of the origin of the bilateral vertebral arteries without high-grade stenosis. Authenticated and Electronically Signed by Malcolm Mccabe DO on 05/09/2024 12:39:56 AM    CT Angiogram Head w AI Analysis of LVO    Result Date: 5/9/2024  FINAL REPORT TECHNIQUE: null CLINICAL HISTORY: Stroke, follow up COMPARISON: null FINDINGS: CT angiography head with contrast. 3D Postprocessing. Comparison: None Findings: Intracranial arteries are patent. Moderate atherosclerosis bilateral ICAs at the level of the carotid siphons without flow-limiting stenosis No aneurysm, dissection, or occlusion. No abnormal intracranial enhancement. Subcortical/periventricular white matter  hypoattenuation statistically representing changes of chronic microvascular ischemia. Global parenchymal volume loss which is commensurate with reported age. No intracranial mass, midline shift, hydrocephalus, abnormal contrast enhancement, or acute hemorrhage. No skull fracture.     IMPRESSION: 1. No occlusion or hemodynamically significant stenosis involving the cerebral vasculature. Authenticated and Electronically Signed by Malcolm Mccabe DO on 05/09/2024 12:31:21 AM    CT CEREBRAL PERFUSION WITH & WITHOUT CONTRAST    Result Date: 5/9/2024  FINAL REPORT TECHNIQUE: null CLINICAL HISTORY: Neuro deficit, acute, stroke suspected COMPARISON: null FINDINGS: CT brain perfusion Comparison: None Findings: Perfusion maps are unremarkable without evidence of cerebral ischemia or core infarct. The cerebral blood flow of less than 30% is 0 mL. The T-max of greater than 6 seconds is 0 mL.     IMPRESSION: Normal brain CT perfusion. No evidence for core infarct or ischemia. Authenticated and Electronically Signed by Malcolm Mccabe DO on 05/09/2024 12:27:56 AM    CT Head Without Contrast Stroke Protocol    Addendum Date: 5/9/2024    ADDENDUM REPORT ADDENDUM: This report was discussed with Jason Taylor MD on May 09, 2024 00:20:00 EDT. Authenticated and Electronically Signed by Malcolm Mccabe DO on 05/09/2024 12:20:51 AM    Result Date: 5/9/2024  FINAL REPORT TECHNIQUE: null CLINICAL HISTORY: Neuro deficit, acute, stroke suspected COMPARISON: null FINDINGS: CT head without contrast Comparison: None Findings: No intracranial mass, midline shift, hydrocephalus, or acute hemorrhage. Subcortical/periventricular white matter hypoattenuation statistically representing changes of chronic microvascular ischemia. Global parenchymal volume loss which is commensurate with reported age. The visualized paranasal sinuses and mastoid air cells are normal. The orbits are unremarkable. No skull fracture.     IMPRESSION: 1. No acute intracranial  process. 2. Subcortical/periventricular white matter hypoattenuation statistically representing changes of chronic microvascular ischemia. Global parenchymal volume loss which is commensurate with reported age. Authenticated and Electronically Signed by Malcolm Mccabe DO on 05/09/2024 12:16:49 AM     Assessment/Plan:    Observation general floor admission early a.m. 5/9/2024 with altered mental status secondary to hypoglycemia, elevated troponin ACS not suspected likely demand ischemia.  Stroke alert called due to the severity of his unresponsiveness, CT imaging negative.      At time of admission comfortable in bed, sluggish yet pleasantly and appropriately conversational.  AO X3.    Attempted to contact wife Abbie on the phone, no answer.    Altered mental status  Hypoglycemia  D5 NS fluids.      Elevated troponin  ACS not suspected, likely demand ischemia.    Chronic:  Parkinson's disease, type 2 diabetes on insulin, coronary artery disease with history of CABG, hypertension, hyperlipidemia, atrial cardiac defibrillator and pacemaker, history of prostate cancer, history of laminectomies, spinal stenosis.      Correctional insulin.  Continue other home medications.    Risk Assessment: High  DVT Prophylaxis: Lovenox  Code Status: DNR/DNI  Diet: Cardiac/diabetic    Advance Care Planning   ACP discussion was held with the patient during this visit. Patient does not have an advance directive, information provided.           Brian Joseph Kerley, DO  05/09/24  01:31 EDT    Dictated utilizing Dragon dictation.

## 2024-05-10 VITALS
DIASTOLIC BLOOD PRESSURE: 58 MMHG | TEMPERATURE: 97.9 F | SYSTOLIC BLOOD PRESSURE: 123 MMHG | BODY MASS INDEX: 25.22 KG/M2 | WEIGHT: 161 LBS | HEART RATE: 84 BPM | OXYGEN SATURATION: 96 % | RESPIRATION RATE: 12 BRPM

## 2024-05-10 LAB
ANION GAP SERPL CALCULATED.3IONS-SCNC: 9.7 MMOL/L (ref 5–15)
BASOPHILS # BLD AUTO: 0.06 10*3/MM3 (ref 0–0.2)
BASOPHILS NFR BLD AUTO: 1.2 % (ref 0–1.5)
BUN SERPL-MCNC: 9 MG/DL (ref 8–23)
BUN/CREAT SERPL: 11.1 (ref 7–25)
CALCIUM SPEC-SCNC: 8.6 MG/DL (ref 8.6–10.5)
CHLORIDE SERPL-SCNC: 102 MMOL/L (ref 98–107)
CO2 SERPL-SCNC: 23.3 MMOL/L (ref 22–29)
CREAT SERPL-MCNC: 0.81 MG/DL (ref 0.76–1.27)
DEPRECATED RDW RBC AUTO: 41.1 FL (ref 37–54)
EGFRCR SERPLBLD CKD-EPI 2021: 93.1 ML/MIN/1.73
EOSINOPHIL # BLD AUTO: 0.27 10*3/MM3 (ref 0–0.4)
EOSINOPHIL NFR BLD AUTO: 5.3 % (ref 0.3–6.2)
ERYTHROCYTE [DISTWIDTH] IN BLOOD BY AUTOMATED COUNT: 12.6 % (ref 12.3–15.4)
GLUCOSE BLDC GLUCOMTR-MCNC: 216 MG/DL (ref 70–130)
GLUCOSE BLDC GLUCOMTR-MCNC: 233 MG/DL (ref 70–130)
GLUCOSE BLDC GLUCOMTR-MCNC: 277 MG/DL (ref 70–130)
GLUCOSE BLDC GLUCOMTR-MCNC: 296 MG/DL (ref 70–130)
GLUCOSE SERPL-MCNC: 206 MG/DL (ref 65–99)
HCT VFR BLD AUTO: 37.9 % (ref 37.5–51)
HGB BLD-MCNC: 13.3 G/DL (ref 13–17.7)
IMM GRANULOCYTES # BLD AUTO: 0.02 10*3/MM3 (ref 0–0.05)
IMM GRANULOCYTES NFR BLD AUTO: 0.4 % (ref 0–0.5)
LYMPHOCYTES # BLD AUTO: 0.9 10*3/MM3 (ref 0.7–3.1)
LYMPHOCYTES NFR BLD AUTO: 17.8 % (ref 19.6–45.3)
MCH RBC QN AUTO: 31.4 PG (ref 26.6–33)
MCHC RBC AUTO-ENTMCNC: 35.1 G/DL (ref 31.5–35.7)
MCV RBC AUTO: 89.6 FL (ref 79–97)
MONOCYTES # BLD AUTO: 0.59 10*3/MM3 (ref 0.1–0.9)
MONOCYTES NFR BLD AUTO: 11.7 % (ref 5–12)
NEUTROPHILS NFR BLD AUTO: 3.21 10*3/MM3 (ref 1.7–7)
NEUTROPHILS NFR BLD AUTO: 63.6 % (ref 42.7–76)
NRBC BLD AUTO-RTO: 0 /100 WBC (ref 0–0.2)
PLATELET # BLD AUTO: 181 10*3/MM3 (ref 140–450)
PMV BLD AUTO: 10.1 FL (ref 6–12)
POTASSIUM SERPL-SCNC: 4.4 MMOL/L (ref 3.5–5.2)
RBC # BLD AUTO: 4.23 10*6/MM3 (ref 4.14–5.8)
SODIUM SERPL-SCNC: 135 MMOL/L (ref 136–145)
WBC NRBC COR # BLD AUTO: 5.05 10*3/MM3 (ref 3.4–10.8)

## 2024-05-10 PROCEDURE — G0378 HOSPITAL OBSERVATION PER HR: HCPCS

## 2024-05-10 PROCEDURE — 63710000001 INSULIN GLARGINE PER 5 UNITS: Performed by: NURSE PRACTITIONER

## 2024-05-10 PROCEDURE — 80048 BASIC METABOLIC PNL TOTAL CA: CPT | Performed by: STUDENT IN AN ORGANIZED HEALTH CARE EDUCATION/TRAINING PROGRAM

## 2024-05-10 PROCEDURE — 85025 COMPLETE CBC W/AUTO DIFF WBC: CPT | Performed by: STUDENT IN AN ORGANIZED HEALTH CARE EDUCATION/TRAINING PROGRAM

## 2024-05-10 PROCEDURE — 99239 HOSP IP/OBS DSCHRG MGMT >30: CPT | Performed by: NURSE PRACTITIONER

## 2024-05-10 PROCEDURE — 93010 ELECTROCARDIOGRAM REPORT: CPT | Performed by: INTERNAL MEDICINE

## 2024-05-10 PROCEDURE — 96372 THER/PROPH/DIAG INJ SC/IM: CPT

## 2024-05-10 PROCEDURE — 93005 ELECTROCARDIOGRAM TRACING: CPT | Performed by: STUDENT IN AN ORGANIZED HEALTH CARE EDUCATION/TRAINING PROGRAM

## 2024-05-10 PROCEDURE — 82948 REAGENT STRIP/BLOOD GLUCOSE: CPT | Performed by: STUDENT IN AN ORGANIZED HEALTH CARE EDUCATION/TRAINING PROGRAM

## 2024-05-10 PROCEDURE — 25010000002 ENOXAPARIN PER 10 MG: Performed by: STUDENT IN AN ORGANIZED HEALTH CARE EDUCATION/TRAINING PROGRAM

## 2024-05-10 PROCEDURE — 82948 REAGENT STRIP/BLOOD GLUCOSE: CPT

## 2024-05-10 RX ADMIN — AMANTADINE HYDROCHLORIDE 100 MG: 100 CAPSULE ORAL at 09:07

## 2024-05-10 RX ADMIN — PRAMIPEXOLE DIHYDROCHLORIDE 0.5 MG: 0.25 TABLET ORAL at 09:06

## 2024-05-10 RX ADMIN — INSULIN GLARGINE 10 UNITS: 100 INJECTION, SOLUTION SUBCUTANEOUS at 09:07

## 2024-05-10 RX ADMIN — Medication 10 ML: at 09:12

## 2024-05-10 RX ADMIN — ENOXAPARIN SODIUM 40 MG: 100 INJECTION SUBCUTANEOUS at 09:07

## 2024-05-10 RX ADMIN — TAMSULOSIN HYDROCHLORIDE 0.4 MG: 0.4 CAPSULE ORAL at 09:06

## 2024-05-10 RX ADMIN — CLOPIDOGREL BISULFATE 75 MG: 75 TABLET ORAL at 09:07

## 2024-05-10 RX ADMIN — LISINOPRIL 2.5 MG: 5 TABLET ORAL at 09:07

## 2024-05-10 NOTE — PLAN OF CARE
Problem: Skin Injury Risk Increased  Goal: Skin Health and Integrity  Outcome: Ongoing, Progressing  Intervention: Optimize Skin Protection  Recent Flowsheet Documentation  Taken 5/9/2024 2200 by Coleen Klein RN  Head of Bed (HOB) Positioning: HOB at 20-30 degrees  Taken 5/9/2024 2000 by Coleen Klein RN  Head of Bed (HOB) Positioning: HOB elevated     Problem: Fall Injury Risk  Goal: Absence of Fall and Fall-Related Injury  Outcome: Ongoing, Progressing  Intervention: Identify and Manage Contributors  Recent Flowsheet Documentation  Taken 5/9/2024 2000 by Coleen Klein RN  Medication Review/Management: medications reviewed  Intervention: Promote Injury-Free Environment  Recent Flowsheet Documentation  Taken 5/9/2024 2200 by Coleen Klein RN  Safety Promotion/Fall Prevention: safety round/check completed  Taken 5/9/2024 2000 by Coleen Klein RN  Safety Promotion/Fall Prevention: safety round/check completed     Problem: Impaired Wound Healing  Goal: Optimal Wound Healing  Outcome: Ongoing, Progressing  Intervention: Promote Wound Healing  Recent Flowsheet Documentation  Taken 5/9/2024 2200 by Coleen Klein RN  Activity Management: activity minimized  Taken 5/9/2024 2000 by Coleen Klein RN  Activity Management: activity minimized     Problem: Diabetes Comorbidity  Goal: Blood Glucose Level Within Targeted Range  Outcome: Ongoing, Progressing   Goal Outcome Evaluation:

## 2024-05-10 NOTE — DISCHARGE SUMMARY
[facility]      Name:  Imtiaz Cabezas   Age:  73 y.o.  Sex:  male  :  1950  MRN:  [id number]   Visit Number:  [id number]    Admission Date:  2024  Date of Discharge:  5/10/2024  Primary Care Physician:  Provider, No Known    Important issues to note:    -Patient admitted with unresponsiveness with arriving fingerstick blood sugar of 41.  Patient improved after amp of D50.  -Patient also noted to have brief episodes of SVT during admission for which he was completely asymptomatic.  ICD interrogation completed.  Patient has not followed with electrophysiologist or cardiology in the past 2 years.  Follow-up appointments encouraged.  Cardizem scheduled instead of as needed as blood pressure tolerates.  -A1c 7.7.  Given poor p.o. intake would decrease/consider discontinuing fast acting insulin and uptitrate Levemir as needed.  -Glucose gel ordered prior to discharge.  -Speech consulted during admission and recommended mechanical ground diet with thin liquids and no mixed consistencies with continued aspiration precautions.  -Home health ordered as wife interested in continuing PT and OT.  -Otherwise reassuring labs and vitals noted.  [facility]  -Strict return precautions given.    Discharge Diagnoses:     Acute metabolic encephalopathy secondary to hypoglycemia, POA  Supraventricular tachycardia, POA  Elevated troponin, POA  Parkinson's disease  Diabetes mellitus type 2 on insulin  Coronary artery disease with history of CABG  Essential hypertension  Hyperlipidemia  V. tach/sick sinus syndrome status post ICD  Spinal stenosis    Problem List:     Active Hospital Problems    Diagnosis  POA    **Hypoglycemia due to insulin [E16.0, T38.3X5A]  Yes      Resolved Hospital Problems   No resolved problems to display.     Presenting Problem:    Chief Complaint   Patient presents with    Loss of Consciousness      Consults:      Consulting Physician(s)         Provider   Role Specialty     Neena Lopez MD      Consulting Physician Neurology          Procedures Performed:        History of presenting illness/Hospital Course:    Imtiaz Cabezas is a 73-year-old man, nursing home resident, with past medical history of Parkinson's disease, type 2 diabetes on insulin, coronary artery disease with history of CABG, hypertension, hyperlipidemia, atrial cardiac defibrillator and pacemaker, history of prostate cancer, history of laminectomies, spinal stenosis.  Presented to [facility] ED [facility]/8/2024 with concern for altered mental status, found down.  Reportedly was unresponsive, only responding to painful stimuli.  At baseline he is alert and oriented.  EMS reported intermittently hypotensive en route, appeared to be in atrial fibrillation on cardiac rhythm.  No medications given prior to arrival.     ED summary: Afebrile, vital signs stable on room air.  EKG sinus rhythm, PVCs noted, nonspecific ST-T wave changes.  High-sensitivity troponin 59, ACS not suspected.  Patient was unresponsive at presentation.  Blood glucose as low as 36.  Blood work otherwise unremarkable.  Stroke alert out of an abundance of caution.  CT angio head negative.  CT angio neck some stenosis, carotid artery, carotid bulbs atherosclerosis, bilateral vertebral arteries sclerosis without high-grade stenosis.  CT head no acute intracranial process, chronic microvascular ischemia present.  CT cerebral perfusion unremarkable.  He was given amp of D50, started on D5NS fluids.  Reportedly patient awakened shortly thereafter.  Patient currently alert and oriented x 3 and eating per usual.  Patient noted that he did not eat well yesterday and continued to take insulin.  Neurology consulted during admission with continued home medications.  Patient will follow-up with Dr. Carpenter, usual neurologist.  Patient also noted to have episodes of SVT during admission for which she was  completely asymptomatic, only lasting seconds.  Patient noted to take Cardizem at the nursing facility as needed for systolic greater than 150.  Cardizem reinitiated with ICD interrogated.  Patient will need to follow-up with cardiologist and electrophysiologist.  Strict return precautions given.  Encouraged to keep glucose gel available for hypoglycemia.  Patient to decrease/discontinue fast acting insulin and increase Levemir due to multiple episodes of hypoglycemia noted.  Patient currently discharged on Levemir 10 units twice daily with instructions to titrate up for continued hyperglycemia.    Vital Signs:    Temp:  [97.7 °F (36.5 °C)-98.5 °F (36.9 °C)] 97.9 °F (36.6 °C)  Heart Rate:  [76-98] 94  Resp:  [12-16] 12  BP: (129-167)/(64-89) 143/64    Physical Exam:    General Appearance:  Alert and cooperative.  Chronically ill middle-age male.  Frail.  Slow to respond.   Head:  Atraumatic and normocephalic.   Eyes: Conjunctivae and sclerae normal, no icterus. No pallor.   Ears:  Ears with no abnormalities noted.   Throat: No oral lesions, no thrush, oral mucosa moist.   Neck: Supple, trachea midline, no thyromegaly.   Back:   No kyphoscoliosis present. No tenderness to palpation.   Lungs:   Breath sounds heard bilaterally equally.  No crackles or wheezing. No Pleural rub or bronchial breathing.  On room air unlabored.   Heart:  Normal S1 and S2, no murmur, no gallop, no rub. No JVD.   Abdomen:   Normal bowel sounds, no masses, no organomegaly. Soft, nontender, nondistended, no rebound tenderness.   Extremities: Supple, no edema, no cyanosis, no clubbing.  Severe rigidity noted of extremities due to Parkinson's.   Pulses: Pulses palpable bilaterally.   Skin: No bleeding or rash.   Neurologic: Alert and oriented x 3. No facial asymmetry. Moves all four limbs. No tremors.  Flat affect.     Pertinent Lab Results:     Results from last 7 days   Lab Units 05/10/24  0654 05/09/24  0313 05/08/24  2339   SODIUM mmol/L 135*  141 139   POTASSIUM mmol/L 4.4 3.8 3.6   CHLORIDE mmol/L 102 106 104   CO2 mmol/L 23.3 24.7 25.6   BUN mg/dL 9 14 16   CREATININE mg/dL 0.81 0.93 1.01   CALCIUM mg/dL 8.6 8.7 9.0   BILIRUBIN mg/dL  --   --  0.4   ALK PHOS U/L  --   --  102   ALT (SGPT) U/L  --   --  13   AST (SGOT) U/L  --   --  13   GLUCOSE mg/dL 206* 94 36*     Results from last 7 days   Lab Units 05/10/24  0654 05/09/24  0455 05/08/24  2339   WBC 10*3/mm3 5.05 5.94 5.89   HEMOGLOBIN g/dL 13.3 12.4* 13.4   HEMATOCRIT % 37.9 35.8* 38.4   PLATELETS 10*3/mm3 181 184 217     Results from last 7 days   Lab Units 05/08/24  2339   INR  1.11*     Results from last 7 days   Lab Units 05/09/24  0313 05/08/24  2339   HSTROP T ng/L 49* 59*                           Pertinent Radiology Results:    Imaging Results (All)       Procedure Component Value Units Date/Time    XR Chest 1 View [[id number]] Collected: 05/09/24 0937     Updated: 05/09/24 0939    Narrative:      PROCEDURE: XR CHEST 1 VW-     HISTORY: Acute Stroke Protocol (onset < 12 hrs)     COMPARISON:  None     FINDINGS:  Portable view of the chest demonstrates the lungs to be  grossly clear. There is no evidence of effusion, pneumothorax or other  significant pleural disease. The mediastinum is unremarkable.     The heart size is normal. Left-sided pacer is present. Patient is status  post CABG.       Impression:      No acute findings           This report was signed and finalized on 5/9/2024 9:37 AM by Malcolm Ling MD.       CT Angiogram Neck [[id number]] Collected: 05/09/24 0039     Updated: 05/09/24 0041    Narrative:      FINAL REPORT    TECHNIQUE:  null    CLINICAL HISTORY:  Stroke, follow up    COMPARISON:  null    FINDINGS:  CT angiography neck with contrast. 3D Postprocessing.    Comparison: CT/SR - CT ANGIOGRAM HEAD W AI ANALYSIS FOR LVO - 05/08/2024 11:42 PM EDT    CT/SR - CT HEAD WO CONTRAST STROKE PROTOCOL - 05/08/2024 11:42 PM EDT    Findings:    Aortic arch and cervical great vessels  are patent.    Calcified atherosclerotic disease and soft intimal plaque within the left common carotid artery resulting in less than 25% stenosis.    Moderate atherosclerosis involving the bilateral carotid bulbs which extends into the proximal bilateral internal carotid arteries without high-grade stenosis. Atherosclerosis involving the bilateral ICAs of the petrous segments and the cavernous   segments without stenosis.    Calcified atherosclerotic disease at the origin of the bilateral vertebral arteries without stenosis.    Visualized intracranial arteries are patent.    No aneurysm, dissection, or occlusion.    No cervical mass or fluid collection.    The left lobe of the thyroid is heterogeneous.    Lung apices clear.    No acute fracture. Multilevel degenerative changes of the cervical spine.    Partially visualized median sternotomy wires.      Impression:      IMPRESSION:    1. No occlusion or hemodynamically significant stenosis involving the neck vasculature.    2. Calcified atherosclerotic disease and soft intimal plaque involving the left common carotid artery resulting in less than 25% stenosis.    3. Moderate atherosclerosis involving the bilateral carotid bulbs which extends into the proximal bilateral internal carotid arteries without high-grade stenosis. Atherosclerosis involving the bilateral ICAs of the petrous segments and the cavernous   segments without stenosis.    4. Calcified atherosclerotic disease of the origin of the bilateral vertebral arteries without high-grade stenosis.    Authenticated and Electronically Signed by Malcolm Mccabe DO on  05/09/2024 12:39:56 AM    CT Angiogram Head w AI Analysis of LVO [[id number]] Collected: 05/09/24 0031     Updated: 05/09/24 0032    Narrative:      FINAL REPORT    TECHNIQUE:  null    CLINICAL HISTORY:  Stroke, follow up    COMPARISON:  null    FINDINGS:  CT angiography head with contrast. 3D Postprocessing.    Comparison:  None    Findings:    Intracranial arteries are patent.    Moderate atherosclerosis bilateral ICAs at the level of the carotid siphons without flow-limiting stenosis    No aneurysm, dissection, or occlusion.    No abnormal intracranial enhancement. Subcortical/periventricular white matter hypoattenuation statistically representing changes of chronic microvascular ischemia. Global parenchymal volume loss which is commensurate with reported age.    No intracranial mass, midline shift, hydrocephalus, abnormal contrast enhancement, or acute hemorrhage.    No skull fracture.      Impression:      IMPRESSION:    1. No occlusion or hemodynamically significant stenosis involving the cerebral vasculature.    Authenticated and Electronically Signed by Malcolm Mccabe DO on  05/09/2024 12:31:21 AM    CT CEREBRAL PERFUSION WITH & WITHOUT CONTRAST [[id number]] Collected: 05/09/24 0027     Updated: 05/09/24 0029    Narrative:      FINAL REPORT    TECHNIQUE:  null    CLINICAL HISTORY:  Neuro deficit, acute, stroke suspected    COMPARISON:  null    FINDINGS:  CT brain perfusion    Comparison: None    Findings:    Perfusion maps are unremarkable without evidence of cerebral ischemia or core infarct.    The cerebral blood flow of less than 30% is 0 mL.    The T-max of greater than 6 seconds is 0 mL.      Impression:      IMPRESSION:    Normal brain CT perfusion. No evidence for core infarct or ischemia.    Authenticated and Electronically Signed by Malcolm Mccabe DO on  05/09/2024 12:27:56 AM    CT Head Without Contrast Stroke Protocol [[id number]] Collected: 05/09/24 0016     Updated: 05/09/24 0022    Addenda:        ADDENDUM REPORT    ADDENDUM:  This report was discussed with Jason Taylor MD on May 09, 2024 00:20:00   EDT.    Authenticated and Electronically Signed by Malcolm Mccabe DO on  05/09/2024 12:20:51 AM  Signed: 05/09/24 0020 by Malcolm Mccabe DO    Narrative:      FINAL REPORT    TECHNIQUE:  null    CLINICAL HISTORY:  Neuro  deficit, acute, stroke suspected    COMPARISON:  null    FINDINGS:  CT head without contrast    Comparison: None    Findings:    No intracranial mass, midline shift, hydrocephalus, or acute hemorrhage.    Subcortical/periventricular white matter hypoattenuation statistically representing changes of chronic microvascular ischemia. Global parenchymal volume loss which is commensurate with reported age.    The visualized paranasal sinuses and mastoid air cells are normal.    The orbits are unremarkable.    No skull fracture.      Impression:      IMPRESSION:    1. No acute intracranial process.    2. Subcortical/periventricular white matter hypoattenuation statistically representing changes of chronic microvascular ischemia. Global parenchymal volume loss which is commensurate with reported age.    Authenticated and Electronically Signed by Malcolm Mccabe DO on  05/09/2024 12:16:49 AM            Echo:      Condition on Discharge:      Stable.    Code status during the hospital stay:    Code Status and Medical Interventions:   Ordered at: 05/09/24 0207     Medical Intervention Limits:    NO intubation (DNI)     Code Status (Patient has no pulse and is not breathing):    No CPR (Do Not Attempt to Resuscitate)     Medical Interventions (Patient has pulse or is breathing):    Limited Support     Discharge Disposition:    Rehab Facility or Unit (DC - External)    Discharge Medications:       Discharge Medications        New Medications        Instructions Start Date   dextrose 40 % gel  Commonly known as: GLUTOSE   15 g, Oral, Every 15 Minutes PRN             Changes to Medications        Instructions Start Date   dilTIAZem  MG 24 hr capsule  Commonly known as: CARDIZEM CD  What changed:   how much to take  when to take this  reasons to take this   120 mg, Oral, Every 24 Hours Scheduled      insulin detemir 100 UNIT/ML injection  Commonly known as: LEVEMIR  What changed:   how much to take  when to take  this  additional instructions   10 Units, Subcutaneous, 2 Times Daily, Titrate up for continued hyperglycemia.             Continue These Medications        Instructions Start Date   albuterol sulfate  (90 Base) MCG/ACT inhaler  Commonly known as: PROVENTIL HFA;VENTOLIN HFA;PROAIR HFA   2 puffs, Inhalation, Every 4 Hours PRN      amantadine 100 MG capsule  Commonly known as: SYMMETREL   100 mg, Oral, 2 Times Daily      clopidogrel 75 MG tablet  Commonly known as: PLAVIX   75 mg, Oral, Daily      glycopyrrolate 1 MG tablet  Commonly known as: ROBINUL   1 mg, Oral, Daily PRN      hydrOXYzine 10 MG tablet  Commonly known as: ATARAX   10 mg, Oral, 2 Times Daily      latanoprost 0.005 % ophthalmic solution  Commonly known as: XALATAN   1 drop, Both Eyes, Nightly      lisinopril 2.5 MG tablet  Commonly known as: PRINIVIL,ZESTRIL   2.5 mg, Oral, Daily      MiraLax 17 g packet  Generic drug: polyethylene glycol   17 g, Oral, Daily      mirtazapine 30 MG disintegrating tablet  Commonly known as: REMERON SOL-TAB   30 mg, Translingual, Nightly      pramipexole 0.5 MG tablet  Commonly known as: MIRAPEX   0.5 mg, Oral, 3 Times Daily      QUEtiapine 100 MG tablet  Commonly known as: SEROquel   150 mg, Oral, Nightly      tamsulosin 0.4 MG capsule 24 hr capsule  Commonly known as: FLOMAX   1 capsule, Oral, Daily      traMADol 50 MG tablet  Commonly known as: ULTRAM   50 mg, Oral, Every 12 Hours PRN      vitamin D3 125 MCG (5000 UT) capsule capsule   5,000 Units, Oral, Daily             Stop These Medications      Insulin Aspart 100 UNIT/ML injection  Commonly known as: novoLOG     insulin regular 100 UNIT/ML injection  Commonly known as: humuLIN R,novoLIN R            Discharge Diet:     Diet Instructions       Diet: Cardiac Diets, Diabetic Diets; Healthy Heart (2-3 Na+); Mechanical Ground (NDD 2); Thin (IDDSI 0); Consistent Carbohydrate; Feeding Assistance - Nursing      Discharge Diet:  Cardiac Diets  Diabetic Diets        Cardiac Diet: Healthy Heart (2-3 Na+)    Texture: Mechanical Ground (NDD 2)    Fluid Consistency: Thin (IDDSI 0)    Diabetic Diet: Consistent Carbohydrate    Diet Modifiers / Additional Diets: Feeding Assistance - Nursing    Diet: Cardiac Diets, Diabetic Diets; Healthy Heart (2-3 Na+); Mechanical Ground (NDD 2); Thin (IDDSI 0); Consistent Carbohydrate; No Mixed Consistencies      Discharge Diet:  Cardiac Diets  Diabetic Diets       Cardiac Diet: Healthy Heart (2-3 Na+)    Texture: Mechanical Ground (NDD 2)    Fluid Consistency: Thin (IDDSI 0)    Diabetic Diet: Consistent Carbohydrate    Diet Modifiers / Additional Diets: No Mixed Consistencies          Activity at Discharge:     Activity Instructions       Activity as Tolerated      Activity as Tolerated            Follow-up Appointments:    Additional Instructions for the Follow-ups that You Need to Schedule       Ambulatory Referral to Home Health ([facility])   As directed      Face to Face Visit Date: 5/9/2024   Follow-up provider for Plan of Care?: I treated the patient in an acute care facility and will not continue treatment after discharge.   Follow-up provider: KEN MARSHALL [[id number]]   Reason/Clinical Findings: debility   Describe mobility limitations that make leaving home difficult: debility   Nursing/Therapeutic Services Requested: Physical Therapy Occupational Therapy   PT orders: Therapeutic exercise Gait Training Transfer training Strengthening Home safety assessment   Weight Bearing Status: As Tolerated   Occupational orders: Activities of daily living Energy conservation Strengthening Cognition Fine motor Home safety assessment               Follow-up Information       Provider, No Known Follow up.    Why: follows up with the provider at [facility]  Contact information:  [facility] [address]  [phone]               Liudmila Carpenter MD Follow up on 5/13/2024.    Specialty: Neurology  Why: call an make appointment in 1 month  Contact  information:  [address]  [address]  [place] [address]  [phone]               Víctor Vora MD Follow up on 6/6/2024.    Specialties: Cardiac Electrophysiology, Cardiology  Why: 01:30pm will see Dr Monae  Contact information:  [address]  [address]  [facility] [address]  [phone]               Anshul Xie MD Follow up on 5/14/2024.    Specialties: Cardiology, Interventional Radiology  Why: 01:00pm  Contact information:  [address]  [facility] [address]  [phone]                           Future Appointments   Date Time Provider Department Center   5/14/2024  1:00 PM Anshul Xie MD [facility]   6/6/2024  1:30 PM Matty Monae MD [facility]     Test Results Pending at Discharge:             Catia Mcclain, APRN  05/10/24  08:18 EDT    Time: I spent 50 minutes on this discharge activity which included: face-to-face encounter with the patient, reviewing the data in the system, coordination of the care with the nursing staff as well as consultants, documentation, and entering orders.     Dictated utilizing Dragon dictation.

## 2024-05-10 NOTE — CASE MANAGEMENT/SOCIAL WORK
Case Management Discharge Note      Final Note: Returning to [facility]    Provided Post Acute Provider List?: N/A  Provided Post Acute Provider Quality & Resource List?: N/A    Selected Continued Care - Admitted Since 5/8/2024       Destination    No services have been selected for the patient.                Durable Medical Equipment    No services have been selected for the patient.                Dialysis/Infusion    No services have been selected for the patient.                Home Medical Care    No services have been selected for the patient.                Therapy    No services have been selected for the patient.                Community Resources    No services have been selected for the patient.                Community & DME    No services have been selected for the patient.                    Transportation Services  Ambulance: [facility]    Final Discharge Disposition Code: 01 - home or self-care

## 2024-05-11 LAB
QT INTERVAL: 394 MS
QTC INTERVAL: 476 MS

## 2024-05-13 ENCOUNTER — TELEPHONE (OUTPATIENT)
Dept: CARDIOLOGY | Facility: CLINIC | Age: 74
End: 2024-05-13
Payer: MEDICAID

## 2024-05-13 NOTE — TELEPHONE ENCOUNTER
Caller: [email or web address]    Relationship to patient: Emergency Contact    Best call back number: [phone]    Chief complaint: PT NEEDS TO RESCHEDULE HIS 05.14.24 HOS FU WITH DR. CLEVELAND    Type of visit: HOS FU    Requested date: 05.28.24 @ 1:00PM     If rescheduling, when is the original appointment: 05.14.24

## 2024-05-14 ENCOUNTER — OFFICE VISIT (OUTPATIENT)
Dept: CARDIOLOGY | Facility: CLINIC | Age: 74
End: 2024-05-14
Payer: MEDICAID

## 2024-05-14 VITALS
HEIGHT: 67 IN | OXYGEN SATURATION: 97 % | RESPIRATION RATE: 18 BRPM | HEART RATE: 56 BPM | SYSTOLIC BLOOD PRESSURE: 134 MMHG | BODY MASS INDEX: 25.22 KG/M2 | DIASTOLIC BLOOD PRESSURE: 62 MMHG

## 2024-05-14 DIAGNOSIS — Z95.810 PRESENCE OF COMBINATION INTERNAL CARDIAC DEFIBRILLATOR (ICD) AND PACEMAKER: Chronic | ICD-10-CM

## 2024-05-14 DIAGNOSIS — I25.5 ISCHEMIC CARDIOMYOPATHY: ICD-10-CM

## 2024-05-14 DIAGNOSIS — E78.5 HYPERLIPIDEMIA, UNSPECIFIED HYPERLIPIDEMIA TYPE: Chronic | ICD-10-CM

## 2024-05-14 DIAGNOSIS — I25.810 CORONARY ARTERY DISEASE INVOLVING CORONARY BYPASS GRAFT OF NATIVE HEART WITHOUT ANGINA PECTORIS: Primary | Chronic | ICD-10-CM

## 2024-05-14 DIAGNOSIS — I10 PRIMARY HYPERTENSION: ICD-10-CM

## 2024-05-14 RX ORDER — ROSUVASTATIN CALCIUM 5 MG/1
5 TABLET, COATED ORAL DAILY
Qty: 30 TABLET | Refills: 11 | Status: SHIPPED | OUTPATIENT
Start: 2024-05-14 | End: 2024-05-15 | Stop reason: SDUPTHER

## 2024-05-14 NOTE — PROGRESS NOTES
"    Subjective:     Encounter Date:05/14/2024      Patient ID: Imtiaz Cabezas is a 73 y.o. male.    Chief Complaint: Coronary artery disease  HPI  This is a 73-year-old male patient who is referred to cardiology clinic to establish care after recent hospitalization for mental status changes.  The patient was found \"down\" at his dwelling but was not unconscious.  He was demonstrated to have severe hypoglycemia.  He had apparently taken excessive amounts of insulin accidentally.  He recovered with administration of glucose.  The patient has a longstanding history of coronary artery disease.  He apparently had three-vessel CABG in [facility] consisting of a LIMA to LAD, SVG to OM and SVG to RCA in 2012.  He apparently had coronary stenting to the right coronary artery in 2016.  He has a history of \"ischemic cardiomyopathy.  His ejection fraction is unknown.  He apparently had a defibrillator placed for this indication.  His defibrillator was interrogated during his hospitalization which showed no defibrillator discharges.  The patient was thought to have nonsustained bursts of SVT on cardiac monitoring.  The only telemetry strips available for review showed sinus tachycardia.  Pacemaker histogram interrogation showed no evidence of SVT.  He has advanced Parkinson's disease and is wheelchair-bound.  He reports compliance with his medications with no perceived side effects.  He is a lifelong non-smoker.  The following portions of the patient's history were reviewed and updated as appropriate: allergies, current medications, past family history, past medical history, past social history, past surgical history and problem  Review of Systems   Constitutional: Negative for chills, diaphoresis, fever, malaise/fatigue, weight gain and weight loss.   HENT:  Negative for ear discharge, hearing loss, hoarse voice and nosebleeds.    Eyes:  Negative for discharge, double vision, pain and photophobia. "   Cardiovascular:  Negative for chest pain, claudication, cyanosis, dyspnea on exertion, irregular heartbeat, leg swelling, near-syncope, orthopnea, palpitations, paroxysmal nocturnal dyspnea and syncope.   Respiratory:  Negative for cough, hemoptysis, sputum production and wheezing.    Endocrine: Negative for cold intolerance, heat intolerance, polydipsia, polyphagia and polyuria.   Hematologic/Lymphatic: Negative for adenopathy and bleeding problem. Does not bruise/bleed easily.   Skin:  Negative for color change, flushing, itching and rash.   Musculoskeletal:  Negative for muscle cramps, muscle weakness, myalgias and stiffness.   Gastrointestinal:  Negative for abdominal pain, diarrhea, hematemesis, hematochezia, nausea and vomiting.   Genitourinary:  Negative for dysuria, frequency and nocturia.   Neurological:  Negative for focal weakness, loss of balance, numbness, paresthesias and seizures.   Psychiatric/Behavioral:  Negative for altered mental status, hallucinations and suicidal ideas.    Allergic/Immunologic: Negative for HIV exposure, hives and persistent infections.           Current Outpatient Medications:     albuterol sulfate  (90 Base) MCG/ACT inhaler, Inhale 2 puffs Every 4 (Four) Hours As Needed for Wheezing., Disp: , Rfl:     amantadine (SYMMETREL) 100 MG capsule, Take 1 capsule by mouth 2 (Two) Times a Day., Disp: , Rfl:     clopidogrel (PLAVIX) 75 MG tablet, Take 1 tablet by mouth Daily., Disp: , Rfl:     dextrose (GLUTOSE) 40 % gel, Take 15 g by mouth Every 15 (Fifteen) Minutes As Needed for Low Blood Sugar (Blood sugar less than 70)., Disp: 60 g, Rfl:     dilTIAZem CD (CARDIZEM CD) 120 MG 24 hr capsule, Take 1 capsule by mouth Daily., Disp: 30 capsule, Rfl: 0    glycopyrrolate (ROBINUL) 1 MG tablet, Take 1 tablet by mouth Daily As Needed (prn)., Disp: , Rfl:     hydrOXYzine (ATARAX) 10 MG tablet, Take 1 tablet by mouth 2 (Two) Times a Day., Disp: , Rfl:     insulin detemir (LEVEMIR) 100  UNIT/ML injection, Inject 10 Units under the skin into the appropriate area as directed 2 (Two) Times a Day. Titrate up for continued hyperglycemia., Disp: , Rfl:     latanoprost (XALATAN) 0.005 % ophthalmic solution, Administer 1 drop to both eyes Every Night., Disp: , Rfl:     lisinopril (PRINIVIL,ZESTRIL) 2.5 MG tablet, Take 1 tablet by mouth Daily., Disp: , Rfl:     mirtazapine (REMERON SOL-TAB) 30 MG disintegrating tablet, Place 1 tablet on the tongue Every Night., Disp: , Rfl:     polyethylene glycol (MiraLax) 17 g packet, Take 17 g by mouth Daily., Disp: , Rfl:     pramipexole (MIRAPEX) 0.5 MG tablet, Take 1 tablet by mouth 3 (Three) Times a Day., Disp: , Rfl:     QUEtiapine (SEROquel) 100 MG tablet, Take 1.5 tablets by mouth Every Night., Disp: , Rfl:     rosuvastatin (CRESTOR) 5 MG tablet, Take 1 tablet by mouth Daily., Disp: 30 tablet, Rfl: 11    tamsulosin (FLOMAX) 0.4 MG capsule 24 hr capsule, Take 1 capsule by mouth Daily., Disp: , Rfl:     traMADol (ULTRAM) 50 MG tablet, Take 1 tablet by mouth Every 12 (Twelve) Hours As Needed for Moderate Pain., Disp: , Rfl:     vitamin D3 125 MCG (5000 UT) capsule capsule, Take 1 capsule by mouth Daily., Disp: , Rfl:     Objective:   Vitals and nursing note reviewed.   Constitutional:       Appearance: Not in distress. Cachectic and frail. Chronically ill-appearing.      Comments: W/C bound   Neck:      Vascular: No JVR. JVD normal.   Pulmonary:      Effort: Pulmonary effort is normal.      Breath sounds: Normal breath sounds. No wheezing. No rhonchi. No rales.   Chest:      Chest wall: Not tender to palpatation.   Cardiovascular:      PMI at left midclavicular line. Normal rate. Regular rhythm. Normal S1. Normal S2.       Murmurs: There is no murmur.      No gallop.  No click. No rub.   Pulses:     Intact distal pulses.   Edema:     Peripheral edema absent.   Abdominal:      General: Bowel sounds are normal.      Palpations: Abdomen is soft.      Tenderness: There  "is no abdominal tenderness.   Musculoskeletal: Normal range of motion.         General: No tenderness. Skin:     General: Skin is warm and dry.   Neurological:      General: No focal deficit present.      Mental Status: Alert and oriented to person, place and time.     Blood pressure 134/62, pulse 56, resp. rate 18, height 170.2 cm (67\"), SpO2 97%.   Lab Review:     Assessment:       1. Coronary artery disease involving coronary bypass graft of native heart without angina pectoris      2. Hyperlipidemia, unspecified hyperlipidemia type  The patient is not on cholesterol-lowering therapy for unknown reason.    3. Presence of combination internal cardiac defibrillator (ICD) and pacemaker  Recent device interrogation of a [employer] dual-chamber rate responsive ICD.  Normal interrogation.    4. Primary hypertension  Acceptable blood pressure control.    5. Ischemic cardiomyopathy  It is assumed that the patient's ejection fraction is diminished in order to qualify for a prophylactic defibrillator.  He is currently not on guideline directed medical therapy for LV systolic dysfunction.  - Adult Transthoracic Echo Complete W/ Cont if Necessary Per Protocol    Procedures    Plan:     Advance Care Planning   ACP discussion was held with the patient during this visit. Patient has an advance directive (not in EMR), copy requested.     I have recommended an echocardiogram.    If his ejection fraction is less than 50%, I would recommend changing his lisinopril to Entresto.  I would also recommend changing his Cardizem CD to one of the 3 beta-blockers with a compelling indication for LV systolic heart failure.    I have recommended starting Crestor 5 mg orally once per day.  The patient is advised given his extensive history of vascular disease that he would benefit from statin based cholesterol-lowering therapy regardless of his LDL status.    Further recommendations to follow.      "

## 2024-05-15 DIAGNOSIS — E78.5 HYPERLIPIDEMIA, UNSPECIFIED HYPERLIPIDEMIA TYPE: Primary | Chronic | ICD-10-CM

## 2024-05-15 RX ORDER — ROSUVASTATIN CALCIUM 5 MG/1
5 TABLET, COATED ORAL DAILY
Qty: 30 TABLET | Refills: 11 | Status: SHIPPED | OUTPATIENT
Start: 2024-05-15

## 2024-05-15 NOTE — TELEPHONE ENCOUNTER
Caller: EARLINE    Relationship: Provider    Best call back number: [phone]     Requested Prescriptions:   Requested Prescriptions     Pending Prescriptions Disp Refills    rosuvastatin (CRESTOR) 5 MG tablet 30 tablet 11     Sig: Take 1 tablet by mouth Daily.        Pharmacy where request should be sent: [id number] - [place], [place] - [address] - [phone]  - [phone] FX     Last office visit with prescribing clinician: 5/14/2024   Last telemedicine visit with prescribing clinician: Visit date not found   Next office visit with prescribing clinician: 6/26/2024     Additional details provided by patient: SENT TO INCORRECT PHARMACY.    Sabas Schuler Rep   05/15/24 11:53 EDT

## 2024-06-04 ENCOUNTER — TELEPHONE (OUTPATIENT)
Dept: CARDIOLOGY | Facility: CLINIC | Age: 74
End: 2024-06-04
Payer: MEDICAID

## 2024-06-04 NOTE — TELEPHONE ENCOUNTER
Name: Imtiaz Cabezas    Relationship: Self    Best Callback Number: [phone]    Incoming call to the Hub, requesting to  Cancel their Other: STJ  appointment on 6-06-24.     Per Hub workflow, further review is needed before the task can be completed.    Result of Call: Please cancel this appointment    PATIENT STATES THEY CANNOT DRIVE TO [place]

## 2024-06-05 PROBLEM — I44.7 LBBB (LEFT BUNDLE BRANCH BLOCK): Status: ACTIVE | Noted: 2024-06-05

## 2024-06-18 ENCOUNTER — APPOINTMENT (OUTPATIENT)
Dept: CT IMAGING | Facility: HOSPITAL | Age: 74
End: 2024-06-18
Payer: MEDICAID

## 2024-06-18 ENCOUNTER — HOSPITAL ENCOUNTER (EMERGENCY)
Facility: HOSPITAL | Age: 74
Discharge: HOME | End: 2024-06-18
Attending: STUDENT IN AN ORGANIZED HEALTH CARE EDUCATION/TRAINING PROGRAM
Payer: MEDICAID

## 2024-06-18 ENCOUNTER — APPOINTMENT (OUTPATIENT)
Dept: GENERAL RADIOLOGY | Facility: HOSPITAL | Age: 74
End: 2024-06-18
Payer: MEDICAID

## 2024-06-18 VITALS
TEMPERATURE: 97.6 F | WEIGHT: 160.94 LBS | RESPIRATION RATE: 18 BRPM | HEIGHT: 67 IN | DIASTOLIC BLOOD PRESSURE: 54 MMHG | OXYGEN SATURATION: 96 % | HEART RATE: 82 BPM | SYSTOLIC BLOOD PRESSURE: 115 MMHG | BODY MASS INDEX: 25.26 KG/M2

## 2024-06-18 DIAGNOSIS — M25.511 ACUTE PAIN OF RIGHT SHOULDER: ICD-10-CM

## 2024-06-18 DIAGNOSIS — S49.91XA INJURY OF RIGHT SHOULDER, INITIAL ENCOUNTER: ICD-10-CM
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
## 2024-06-18 PROCEDURE — 99284 EMERGENCY DEPT VISIT MOD MDM: CPT

## 2024-06-18 PROCEDURE — 72125 CT NECK SPINE W/O DYE: CPT

## 2024-06-18 PROCEDURE — 93005 ELECTROCARDIOGRAM TRACING: CPT | Performed by: PHYSICIAN ASSISTANT

## 2024-06-18 PROCEDURE — 70450 CT HEAD/BRAIN W/O DYE: CPT

## 2024-06-18 PROCEDURE — 73030 X-RAY EXAM OF SHOULDER: CPT

## 2024-06-18 NOTE — ED PROVIDER NOTES
Subjective:  Chief Complaint:  Fall    History of Present Illness:  Patient is a 73-year-old male with history of diabetes, CAD, hypertension, Parkinson's disease, among others presenting to the ER with complaints of an unwitnessed fall at his nursing facility.  Evidently nursing staff reports that he hit his head and is on anticoagulants.  He is on Plavix.  Patient denies any neck pain.  He is alert and oriented x 3 and appears to be answering questions appropriately during evaluation.  He only complains of right shoulder pain.  He does have his left forearm wrapped and states that this is from a skin tear from a previous fall.  Denies hip or lower extremity pain.  States that he just rolled out of bed.  States he was asleep and next thing he knew he was in the floor.  Denies additional symptoms or complaints at this time.  EMS at bedside reports that nursing staff said the patient was bradycardic.  He has a pacemaker but EMS reports he does not have a paced rhythm at this time and has thrown some PVCs but been stable up to this point.      Nurses Notes reviewed and agree, including vitals, allergies, social history and prior medical history.     REVIEW OF SYSTEMS: All systems reviewed and not pertinent unless noted.  Review of Systems   Cardiovascular:         Bradycardia per nursing home   Musculoskeletal:         Right shoulder pain   All other systems reviewed and are negative.      Past Medical History:   Diagnosis Date    Arthritis     Coronary artery disease involving coronary bypass graft of native heart without angina pectoris 01/01/2016     bypass x3 in January 2012 at [place] (LIMA to the left anterior descending, SVG to right coronary artery, SVG to OM), a stent to the right coronary artery in January 2016    Diabetes     Heart attack     High blood pressure     Impaired mobility     Parkinson disease        Allergies:    Codeine      Past Surgical History:   Procedure Laterality Date    ARTERIAL  "BYPASS SURGERY      BACK SURGERY      CARDIAC SURGERY      CAROTID STENT      HIP SURGERY      INSERT / REPLACE / REMOVE PACEMAKER      PACEMAKER IMPLANTATION           Social History     Socioeconomic History    Marital status:    Tobacco Use    Smoking status: Never    Smokeless tobacco: Never   Vaping Use    Vaping status: Never Used   Substance and Sexual Activity    Alcohol use: Never    Drug use: Never    Sexual activity: Defer         Family History   Problem Relation Age of Onset    Parkinsonism Other     Diabetes Other     Other Mother         mitral valve replacement       Objective  Physical Exam:  /54 (BP Location: Right arm, Patient Position: Lying)   Pulse 85   Temp 97.6 °F (36.4 °C) (Oral)   Resp 18   Ht 170.2 cm (67\")   Wt 73 kg (160 lb 15 oz)   SpO2 98%   BMI 25.21 kg/m²      Physical Exam  Vitals and nursing note reviewed.   Constitutional:       General: He is not in acute distress.     Appearance: He is not toxic-appearing.   HENT:      Head: Normocephalic and atraumatic.      Right Ear: External ear normal.      Left Ear: External ear normal.      Nose: Nose normal.   Eyes:      Extraocular Movements: Extraocular movements intact.      Conjunctiva/sclera: Conjunctivae normal.   Cardiovascular:      Rate and Rhythm: Normal rate.   Pulmonary:      Effort: Pulmonary effort is normal. No respiratory distress.   Abdominal:      General: There is no distension.      Palpations: Abdomen is soft.      Tenderness: There is no abdominal tenderness.   Musculoskeletal:      Cervical back: Normal range of motion and neck supple. No tenderness.      Comments: RUE: 2+ pulses, decreased ROM, difficulty abducting the arm, sensation intact, cap refill <2 secs   Skin:     General: Skin is warm and dry.   Neurological:      General: No focal deficit present.      Mental Status: He is alert and oriented to person, place, and time.   Psychiatric:         Mood and Affect: Mood normal.         " Behavior: Behavior normal.         Procedures    ED Course:         Lab Results (last 24 hours)       ** No results found for the last 24 hours. **             CT Cervical Spine Without Contrast    Result Date: 6/18/2024    CT C-SPINE.  HISTORY: Status post fall with neck pain.  COMPARISON:  None .  PROCEDURE: Thin section axial images were obtained from the skull base to the thoracic inlet without contrast. Coronal and sagittal reformatted images were performed and reviewed. This study was performed with techniques to keep radiation doses as low as reasonably achievable, (ALARA). Individualized dose reduction techniques using automated exposure control or adjustment of mA and/or kV according to the patient size were employed.  FINDINGS: There is reversal of the normal cervical spine lordosis. There is no subluxation or fracture. The facets overlap at all levels. The spinous processes are normal.  The prevertebral soft tissues are normal. Moderate to severe multilevel degenerative disc and facet degenerative changes. There are no soft tissue abnormalities. C1-C2 articulation and craniocervical articulation is intact..      Impression: No acute process.    CTDI: 31.64 mGy DLP:582.75 mGy.cm  This report was signed and finalized on 6/18/2024 10:07 AM by Lourdes Grossman MD.      CT Head Without Contrast    Result Date: 6/18/2024  CT HEAD WITHOUT CONTRAST  HISTORY: Trauma  TECHNIQUE: Noncontrast exam  FINDINGS: Limited by motion artifact. Mild brain parenchymal volume loss. Chronic white matter changes. No evidence of hemorrhage, mass effect or edema. No extra-axial abnormality is noted. Ventricles and cisterns appear normal.  Right parietal scalp soft tissue swelling.  The visualized sinuses, orbits and petrous temporal bones appear unremarkable.      Impression: No acute intracranial abnormality.     CTDI: 31.64 mGy DLP:582.75 mGy.cm   This study was performed using dose reduction techniques to achieve radiation  exposure as low as reasonably achievable (ALARA)  This report was signed and finalized on 6/18/2024 10:02 AM by Lourdes Grossman MD.      XR Shoulder 2+ View Right    Result Date: 6/18/2024  PROCEDURE: XR SHOULDER 2+ VW RIGHT-  History: Pain status post injury.  COMPARISON: None.  FINDINGS:  A 3 view exam demonstrates no acute fracture or dislocation. Severe degenerative changes. Soft tissue swelling..      Impression: No acute fracture.     This report was signed and finalized on 6/18/2024 9:13 AM by Lourdes Grossman MD.          MDM     Amount and/or Complexity of Data Reviewed  Tests in the radiology section of CPT®: reviewed  Tests in the medicine section of CPT®: reviewed    Patient evaluated in the ER for unwitnessed fall at nursing facility in which they believe he hit his head.  Patient on Plavix.  He is hemodynamically stable, afebrile, nontoxic-appearing on exam.  He is alert and oriented x 3 during initial evaluation.  Complains of right shoulder pain.  Difficulty abducting the right upper extremity.  Differential diagnosis includes but is not limited to fracture, sprain/strain, clavicular fracture, AC separation, rotator cuff injury, intracranial hemorrhage, C-spine fracture, among others.  Initial plan includes CT head, C-spine, and right shoulder x-ray as well as EKG given reported bradycardia in nursing home.  Patient denies any chest pain or cardiac symptoms.    Imaging unremarkable with no acute bony abnormality, no acute intracranial abnormality.  There is soft tissue swelling noted to the right shoulder.  Patient was given a sling and Ortho follow-up with Dr. Argueta.  Recommended follow-up with PCP as well for further outpatient evaluation as needed.  Patient discharged back to nursing facility in stable condition.    Final diagnoses:   Fall at nursing home, initial encounter   Acute pain of right shoulder   Injury of right shoulder, initial encounter          Coleen Ascencio PA-C  06/18/24  [id number]

## 2024-06-18 NOTE — DISCHARGE INSTRUCTIONS
Wear sling for comfort.  Be sure to remove the sling and move shoulder a few times daily to prevent stiffening of the joint.  Alternate ice and heat to the area.  Follow-up with Dr. Argueta, orthopedic specialist, for further outpatient evaluation and definitive care of shoulder injury.  Follow-up with your PCP as needed.  Return to the ER for new or worsening symptoms or acute concerns.

## 2024-07-24 ENCOUNTER — OFFICE VISIT (OUTPATIENT)
Dept: CARDIOLOGY | Facility: CLINIC | Age: 74
End: 2024-07-24
Payer: MEDICAID

## 2024-07-24 VITALS
HEIGHT: 67 IN | SYSTOLIC BLOOD PRESSURE: 118 MMHG | WEIGHT: 149 LBS | DIASTOLIC BLOOD PRESSURE: 60 MMHG | BODY MASS INDEX: 23.39 KG/M2 | HEART RATE: 67 BPM | OXYGEN SATURATION: 94 %

## 2024-07-24 DIAGNOSIS — I25.5 ISCHEMIC CARDIOMYOPATHY: ICD-10-CM

## 2024-07-24 DIAGNOSIS — I10 ESSENTIAL HYPERTENSION: ICD-10-CM

## 2024-07-24 DIAGNOSIS — I25.810 CORONARY ARTERY DISEASE INVOLVING CORONARY BYPASS GRAFT OF NATIVE HEART WITHOUT ANGINA PECTORIS: Primary | Chronic | ICD-10-CM

## 2024-07-24 DIAGNOSIS — Z95.810 PRESENCE OF COMBINATION INTERNAL CARDIAC DEFIBRILLATOR (ICD) AND PACEMAKER: Chronic | ICD-10-CM

## 2024-07-24 DIAGNOSIS — E78.5 DYSLIPIDEMIA: ICD-10-CM

## 2024-07-24 DIAGNOSIS — I44.7 LBBB (LEFT BUNDLE BRANCH BLOCK): ICD-10-CM

## 2024-07-24 PROCEDURE — 1160F RVW MEDS BY RX/DR IN RCRD: CPT | Performed by: INTERNAL MEDICINE

## 2024-07-24 PROCEDURE — 99213 OFFICE O/P EST LOW 20 MIN: CPT | Performed by: INTERNAL MEDICINE

## 2024-07-24 PROCEDURE — 1159F MED LIST DOCD IN RCRD: CPT | Performed by: INTERNAL MEDICINE

## 2024-07-24 PROCEDURE — 3078F DIAST BP <80 MM HG: CPT | Performed by: INTERNAL MEDICINE

## 2024-07-24 PROCEDURE — 3074F SYST BP LT 130 MM HG: CPT | Performed by: INTERNAL MEDICINE

## 2024-07-24 RX ORDER — DAPAGLIFLOZIN 10 MG/1
TABLET, FILM COATED ORAL
COMMUNITY

## 2024-07-24 NOTE — PROGRESS NOTES
Subjective:     Encounter Date:07/24/2024      Patient ID: Imtiaz Cabezas is a 74 y.o. male.    Chief Complaint: CHF  HPI  This is a 74-year-old male patient who presents to cardiology clinic for post hospitalization discharge follow-up.  The patient was hospitalized 1 month ago at the [facility] for CHF decompensation.  Echocardiogram at that time showed an ejection fraction of 50% with inferior regional wall motion abnormalities.  He was demonstrated to have aortic valve sclerosis without stenosis and mild aortic regurgitation.  Since hospital discharge the patient reports feeling better.  He has advanced Parkinson's disease with dementia and is wheelchair-bound.  He reports compliance with his medications with no perceived side effects.  He is a non-smoker.  The following portions of the patient's history were reviewed and updated as appropriate: allergies, current medications, past family history, past medical history, past social history, past surgical history and problem  Review of Systems   Constitutional: Negative for chills, diaphoresis, fever, malaise/fatigue, weight gain and weight loss.   HENT:  Negative for ear discharge, hearing loss, hoarse voice and nosebleeds.    Eyes:  Negative for discharge, double vision, pain and photophobia.   Cardiovascular:  Negative for chest pain, claudication, cyanosis, dyspnea on exertion, irregular heartbeat, leg swelling, near-syncope, orthopnea, palpitations, paroxysmal nocturnal dyspnea and syncope.   Respiratory:  Negative for cough, hemoptysis, sputum production and wheezing.    Endocrine: Negative for cold intolerance, heat intolerance, polydipsia, polyphagia and polyuria.   Hematologic/Lymphatic: Negative for adenopathy and bleeding problem. Does not bruise/bleed easily.   Skin:  Negative for color change, flushing, itching and rash.   Musculoskeletal:  Negative for muscle cramps, muscle weakness, myalgias and stiffness.    Gastrointestinal:  Negative for abdominal pain, diarrhea, hematemesis, hematochezia, nausea and vomiting.   Genitourinary:  Negative for dysuria, frequency and nocturia.   Neurological:  Negative for focal weakness, loss of balance, numbness, paresthesias and seizures.   Psychiatric/Behavioral:  Negative for altered mental status, hallucinations and suicidal ideas.    Allergic/Immunologic: Negative for HIV exposure, hives and persistent infections.           Current Outpatient Medications:     amantadine (SYMMETREL) 100 MG capsule, Take 1 capsule by mouth 2 (Two) Times a Day., Disp: , Rfl:     clopidogrel (PLAVIX) 75 MG tablet, Take 1 tablet by mouth Daily., Disp: , Rfl:     dapagliflozin Propanediol (Farxiga) 10 MG tablet, Take  by mouth., Disp: , Rfl:     dextrose (GLUTOSE) 40 % gel, Take 15 g by mouth Every 15 (Fifteen) Minutes As Needed for Low Blood Sugar (Blood sugar less than 70)., Disp: 60 g, Rfl:     hydrOXYzine (ATARAX) 10 MG tablet, Take 1 tablet by mouth 2 (Two) Times a Day., Disp: , Rfl:     insulin detemir (LEVEMIR) 100 UNIT/ML injection, Inject 10 Units under the skin into the appropriate area as directed 2 (Two) Times a Day. Titrate up for continued hyperglycemia., Disp: , Rfl:     latanoprost (XALATAN) 0.005 % ophthalmic solution, Administer 1 drop to both eyes Every Night., Disp: , Rfl:     lisinopril (PRINIVIL,ZESTRIL) 2.5 MG tablet, Take 1 tablet by mouth Daily., Disp: , Rfl:     mirtazapine (REMERON SOL-TAB) 30 MG disintegrating tablet, Place 1 tablet on the tongue Every Night., Disp: , Rfl:     polyethylene glycol (MiraLax) 17 g packet, Take 17 g by mouth Daily., Disp: , Rfl:     pramipexole (MIRAPEX) 0.5 MG tablet, Take 1 tablet by mouth 3 (Three) Times a Day., Disp: , Rfl:     QUEtiapine (SEROquel) 100 MG tablet, Take 1.5 tablets by mouth Every Night., Disp: , Rfl:     rosuvastatin (CRESTOR) 5 MG tablet, Take 1 tablet by mouth Daily. (Patient taking differently: Take 4 tablets by mouth  "Daily.), Disp: 30 tablet, Rfl: 11    tamsulosin (FLOMAX) 0.4 MG capsule 24 hr capsule, Take 1 capsule by mouth Daily., Disp: , Rfl:     vitamin D3 125 MCG (5000 UT) capsule capsule, Take 1 capsule by mouth Daily., Disp: , Rfl:     Objective:   Vitals and nursing note reviewed.   Constitutional:       Appearance: Not in distress. Cachectic and frail. Chronically ill-appearing.      Comments: W/C bound   Neck:      Vascular: No JVR. JVD normal.   Pulmonary:      Effort: Pulmonary effort is normal.      Breath sounds: Normal breath sounds. No wheezing. No rhonchi. No rales.   Chest:      Chest wall: Not tender to palpatation.   Cardiovascular:      PMI at left midclavicular line. Normal rate. Regular rhythm. Normal S1. Normal S2.       Murmurs: There is no murmur.      No gallop.  No click. No rub.   Pulses:     Intact distal pulses.   Edema:     Peripheral edema absent.   Abdominal:      General: Bowel sounds are normal.      Palpations: Abdomen is soft.      Tenderness: There is no abdominal tenderness.   Musculoskeletal: Normal range of motion.         General: No tenderness. Skin:     General: Skin is warm and dry.   Neurological:      General: No focal deficit present.      Mental Status: Alert and oriented to person, place and time.       Blood pressure 118/60, pulse 67, height 170.2 cm (67.01\"), weight 67.6 kg (149 lb), SpO2 94%.   Lab Review:     Assessment:       1. Coronary artery disease involving coronary bypass graft of native heart without angina pectoris      2. Essential hypertension  Acceptable blood pressure control.  Apparently, beta-blocker therapy was discontinued due to hypotension.  Lisinopril dose has been down titrated.  Cardizem CD was discontinued.    3. LBBB (left bundle branch block)  Chronic finding.    4. Presence of combination internal cardiac defibrillator (ICD) and pacemaker  No ICD discharges.    5. Ischemic cardiomyopathy  Heart failure with midrange ejection fraction.  Stage C.  " New [place] Heart Association functional class I symptoms.  Euvolemic.    6. Dyslipidemia  Tolerating high intensity statin based cholesterol-lowering therapy without side effects.    Procedures    Plan:     Advance Care Planning   ACP discussion was held with the patient during this visit. Patient has an advance directive (not in EMR), copy requested.     No changes in medications made at today's visit.    No cardiovascular testing warranted.

## 2024-08-28 ENCOUNTER — TRANSCRIBE ORDERS (OUTPATIENT)
Dept: ADMINISTRATIVE | Facility: HOSPITAL | Age: 74
End: 2024-08-28
Payer: MEDICAID

## 2024-08-28 DIAGNOSIS — R13.10 DYSPHAGIA, UNSPECIFIED TYPE: Primary | ICD-10-CM

## 2024-09-23 ENCOUNTER — HOSPITAL ENCOUNTER (OUTPATIENT)
Dept: GENERAL RADIOLOGY | Facility: HOSPITAL | Age: 74
Discharge: HOME | End: 2024-09-23
Admitting: NURSE PRACTITIONER
Payer: MEDICAID

## 2024-09-23 DIAGNOSIS — R13.10 DYSPHAGIA, UNSPECIFIED TYPE: ICD-10-CM

## 2024-09-23 PROCEDURE — 74220 X-RAY XM ESOPHAGUS 1CNTRST: CPT

## 2024-10-21 ENCOUNTER — APPOINTMENT (OUTPATIENT)
Dept: GENERAL RADIOLOGY | Facility: HOSPITAL | Age: 74
End: 2024-10-21
Payer: MEDICAID

## 2024-10-21 ENCOUNTER — HOSPITAL ENCOUNTER (EMERGENCY)
Facility: HOSPITAL | Age: 74
Discharge: NURSING, REHAB OR LONG-TERM CARE | End: 2024-10-21
Attending: EMERGENCY MEDICINE | Admitting: EMERGENCY MEDICINE
Payer: MEDICAID

## 2024-10-21 VITALS
BODY MASS INDEX: 23.39 KG/M2 | DIASTOLIC BLOOD PRESSURE: 60 MMHG | HEIGHT: 67 IN | RESPIRATION RATE: 12 BRPM | SYSTOLIC BLOOD PRESSURE: 109 MMHG | TEMPERATURE: 97.8 F | WEIGHT: 149.03 LBS

## 2024-10-21 DIAGNOSIS — L03.011 CELLULITIS OF FINGER OF RIGHT HAND: Primary | ICD-10-CM

## 2024-10-21 LAB
ALBUMIN SERPL-MCNC: 3.7 G/DL (ref 3.5–5.2)
ALBUMIN/GLOB SERPL: 1.1 G/DL
ALP SERPL-CCNC: 142 U/L (ref 39–117)
ALT SERPL W P-5'-P-CCNC: 37 U/L (ref 1–41)
ANION GAP SERPL CALCULATED.3IONS-SCNC: 10.9 MMOL/L (ref 5–15)
AST SERPL-CCNC: 32 U/L (ref 1–40)
BASOPHILS # BLD AUTO: 0.05 10*3/MM3 (ref 0–0.2)
BASOPHILS NFR BLD AUTO: 0.7 % (ref 0–1.5)
BILIRUB SERPL-MCNC: 0.4 MG/DL (ref 0–1.2)
BUN SERPL-MCNC: 15 MG/DL (ref 8–23)
BUN/CREAT SERPL: 15.6 (ref 7–25)
CALCIUM SPEC-SCNC: 9 MG/DL (ref 8.6–10.5)
CHLORIDE SERPL-SCNC: 101 MMOL/L (ref 98–107)
CO2 SERPL-SCNC: 24.1 MMOL/L (ref 22–29)
CREAT SERPL-MCNC: 0.96 MG/DL (ref 0.76–1.27)
CRP SERPL-MCNC: 7.11 MG/DL (ref 0–0.5)
DEPRECATED RDW RBC AUTO: 42.5 FL (ref 37–54)
EGFRCR SERPLBLD CKD-EPI 2021: 82.9 ML/MIN/1.73
EOSINOPHIL # BLD AUTO: 0.09 10*3/MM3 (ref 0–0.4)
EOSINOPHIL NFR BLD AUTO: 1.2 % (ref 0.3–6.2)
ERYTHROCYTE [DISTWIDTH] IN BLOOD BY AUTOMATED COUNT: 12.8 % (ref 12.3–15.4)
ERYTHROCYTE [SEDIMENTATION RATE] IN BLOOD: 16 MM/HR (ref 0–20)
GLOBULIN UR ELPH-MCNC: 3.3 GM/DL
GLUCOSE SERPL-MCNC: 300 MG/DL (ref 65–99)
HCT VFR BLD AUTO: 36.2 % (ref 37.5–51)
HGB BLD-MCNC: 12.2 G/DL (ref 13–17.7)
IMM GRANULOCYTES # BLD AUTO: 0.02 10*3/MM3 (ref 0–0.05)
IMM GRANULOCYTES NFR BLD AUTO: 0.3 % (ref 0–0.5)
LYMPHOCYTES # BLD AUTO: 0.58 10*3/MM3 (ref 0.7–3.1)
LYMPHOCYTES NFR BLD AUTO: 8 % (ref 19.6–45.3)
MCH RBC QN AUTO: 30 PG (ref 26.6–33)
MCHC RBC AUTO-ENTMCNC: 33.7 G/DL (ref 31.5–35.7)
MCV RBC AUTO: 89.2 FL (ref 79–97)
MONOCYTES # BLD AUTO: 0.59 10*3/MM3 (ref 0.1–0.9)
MONOCYTES NFR BLD AUTO: 8.2 % (ref 5–12)
NEUTROPHILS NFR BLD AUTO: 5.88 10*3/MM3 (ref 1.7–7)
NEUTROPHILS NFR BLD AUTO: 81.6 % (ref 42.7–76)
NRBC BLD AUTO-RTO: 0 /100 WBC (ref 0–0.2)
PLATELET # BLD AUTO: 210 10*3/MM3 (ref 140–450)
PMV BLD AUTO: 9.6 FL (ref 6–12)
POTASSIUM SERPL-SCNC: 4.5 MMOL/L (ref 3.5–5.2)
PROT SERPL-MCNC: 7 G/DL (ref 6–8.5)
RBC # BLD AUTO: 4.06 10*6/MM3 (ref 4.14–5.8)
SODIUM SERPL-SCNC: 136 MMOL/L (ref 136–145)
WBC NRBC COR # BLD AUTO: 7.21 10*3/MM3 (ref 3.4–10.8)

## 2024-10-21 PROCEDURE — 85652 RBC SED RATE AUTOMATED: CPT

## 2024-10-21 PROCEDURE — 85025 COMPLETE CBC W/AUTO DIFF WBC: CPT

## 2024-10-21 PROCEDURE — 73130 X-RAY EXAM OF HAND: CPT

## 2024-10-21 PROCEDURE — 80053 COMPREHEN METABOLIC PANEL: CPT

## 2024-10-21 PROCEDURE — 86140 C-REACTIVE PROTEIN: CPT

## 2024-10-21 PROCEDURE — 96365 THER/PROPH/DIAG IV INF INIT: CPT

## 2024-10-21 PROCEDURE — 99283 EMERGENCY DEPT VISIT LOW MDM: CPT

## 2024-10-21 PROCEDURE — 25010000002 CEFTRIAXONE PER 250 MG

## 2024-10-21 RX ORDER — DOXYCYCLINE 100 MG/1
100 CAPSULE ORAL 2 TIMES DAILY
Qty: 20 CAPSULE | Refills: 0 | Status: SHIPPED | OUTPATIENT
Start: 2024-10-21 | End: 2024-10-31

## 2024-10-21 RX ADMIN — CEFTRIAXONE 1000 MG: 1 INJECTION, POWDER, FOR SOLUTION INTRAMUSCULAR; INTRAVENOUS at 19:40

## 2024-10-21 NOTE — ED PROVIDER NOTES
EMERGENCY DEPARTMENT ENCOUNTER    Pt Name: Imtiaz Cabezas  MRN: [id number]  Pt :   1950  Room Number:  23SF/23  Date of encounter:  10/21/2024  PCP: Provider, No Known  ED Provider: Josesito Sun PA-C    Historian: Patient, patient's wife at bedside, patient's brother at bedside, nursing notes      HPI:  Chief Complaint: Finger swelling and redness        Context: Imtiaz Cabezas is a 74 y.o. male who presents to the ED c/o finger swelling and redness for the past 2 days.  Patient's wife at bedside states that since Saturday the patient has had a red swollen erythematous finger that has had some streaking redness up his forearm.  Patient is currently a resident of [facility].  Patient's wife states the patient has been taking Keflex antibiotics since Saturday evening.      PAST MEDICAL HISTORY  Past Medical History:   Diagnosis Date    Arthritis     Coronary artery disease involving coronary bypass graft of native heart without angina pectoris 2016     bypass x3 in 2012 at [facility] (LIMA to the left anterior descending, SVG to right coronary artery, SVG to OM), a stent to the right coronary artery in 2016    Diabetes     Heart attack     High blood pressure     Impaired mobility     Parkinson disease          PAST SURGICAL HISTORY  Past Surgical History:   Procedure Laterality Date    ARTERIAL BYPASS SURGERY      BACK SURGERY      CARDIAC SURGERY      CAROTID STENT      HIP SURGERY      INSERT / REPLACE / REMOVE PACEMAKER      PACEMAKER IMPLANTATION           FAMILY HISTORY  Family History   Problem Relation Age of Onset    Parkinsonism Other     Diabetes Other     Other Mother         mitral valve replacement         SOCIAL HISTORY  Social History     Socioeconomic History    Marital status:    Tobacco Use    Smoking status: Never    Smokeless tobacco: Never   Vaping Use    Vaping status: Never Used   Substance and Sexual Activity    Alcohol use: Never    Drug  use: Never    Sexual activity: Defer         ALLERGIES  Codeine        REVIEW OF SYSTEMS  Review of Systems   Constitutional:  Negative for chills and fever.   HENT:  Negative for congestion and sore throat.    Respiratory:  Negative for cough and shortness of breath.    Cardiovascular:  Negative for chest pain.   Gastrointestinal:  Negative for abdominal pain, nausea and vomiting.   Genitourinary:  Negative for dysuria.   Musculoskeletal:  Negative for back pain.   Skin:  Positive for color change. Negative for wound.   Neurological:  Negative for dizziness and headaches.   Psychiatric/Behavioral:  Negative for confusion.    All other systems reviewed and are negative.         All systems reviewed and negative except for those discussed in HPI.       PHYSICAL EXAM    I have reviewed the triage vital signs and nursing notes.    ED Triage Vitals [10/21/24 1816]   Temp Pulse Resp BP SpO2   97.8 °F (36.6 °C) -- 12 109/60 --      Temp src Heart Rate Source Patient Position BP Location FiO2 (%)   Oral -- Sitting Left arm --       Physical Exam  Vitals and nursing note reviewed.   Constitutional:       General: He is not in acute distress.     Appearance: Normal appearance. He is not ill-appearing, toxic-appearing or diaphoretic.   HENT:      Head: Normocephalic and atraumatic.      Right Ear: External ear normal.      Left Ear: External ear normal.      Nose: No congestion or rhinorrhea.      Mouth/Throat:      Mouth: Mucous membranes are moist.      Pharynx: Oropharynx is clear.   Eyes:      Conjunctiva/sclera: Conjunctivae normal.   Cardiovascular:      Rate and Rhythm: Normal rate.      Heart sounds: Normal heart sounds.   Pulmonary:      Effort: Pulmonary effort is normal. No respiratory distress.      Breath sounds: Normal breath sounds. No wheezing.   Abdominal:      Tenderness: There is no abdominal tenderness.   Musculoskeletal:      Cervical back: Normal range of motion and neck supple.      Right lower leg: No  edema.      Left lower leg: No edema.   Skin:     Findings: Erythema present. No rash.          Neurological:      Mental Status: He is alert.             LAB RESULTS  Recent Results (from the past 24 hours)   Comprehensive Metabolic Panel    Collection Time: 10/21/24  7:14 PM    Specimen: Blood   Result Value Ref Range    Glucose 300 (H) 65 - 99 mg/dL    BUN 15 8 - 23 mg/dL    Creatinine 0.96 0.76 - 1.27 mg/dL    Sodium 136 136 - 145 mmol/L    Potassium 4.5 3.5 - 5.2 mmol/L    Chloride 101 98 - 107 mmol/L    CO2 24.1 22.0 - 29.0 mmol/L    Calcium 9.0 8.6 - 10.5 mg/dL    Total Protein 7.0 6.0 - 8.5 g/dL    Albumin 3.7 3.5 - 5.2 g/dL    ALT (SGPT) 37 1 - 41 U/L    AST (SGOT) 32 1 - 40 U/L    Alkaline Phosphatase 142 (H) 39 - 117 U/L    Total Bilirubin 0.4 0.0 - 1.2 mg/dL    Globulin 3.3 gm/dL    A/G Ratio 1.1 g/dL    BUN/Creatinine Ratio 15.6 7.0 - 25.0    Anion Gap 10.9 5.0 - 15.0 mmol/L    eGFR 82.9 >60.0 mL/min/1.73   C-reactive Protein    Collection Time: 10/21/24  7:14 PM    Specimen: Blood   Result Value Ref Range    C-Reactive Protein 7.11 (H) 0.00 - 0.50 mg/dL   Sedimentation Rate    Collection Time: 10/21/24  7:14 PM    Specimen: Blood   Result Value Ref Range    Sed Rate 16 0 - 20 mm/hr   CBC Auto Differential    Collection Time: 10/21/24  7:14 PM    Specimen: Blood   Result Value Ref Range    WBC 7.21 3.40 - 10.80 10*3/mm3    RBC 4.06 (L) 4.14 - 5.80 10*6/mm3    Hemoglobin 12.2 (L) 13.0 - 17.7 g/dL    Hematocrit 36.2 (L) 37.5 - 51.0 %    MCV 89.2 79.0 - 97.0 fL    MCH 30.0 26.6 - 33.0 pg    MCHC 33.7 31.5 - 35.7 g/dL    RDW 12.8 12.3 - 15.4 %    RDW-SD 42.5 37.0 - 54.0 fl    MPV 9.6 6.0 - 12.0 fL    Platelets 210 140 - 450 10*3/mm3    Neutrophil % 81.6 (H) 42.7 - 76.0 %    Lymphocyte % 8.0 (L) 19.6 - 45.3 %    Monocyte % 8.2 5.0 - 12.0 %    Eosinophil % 1.2 0.3 - 6.2 %    Basophil % 0.7 0.0 - 1.5 %    Immature Grans % 0.3 0.0 - 0.5 %    Neutrophils, Absolute 5.88 1.70 - 7.00 10*3/mm3    Lymphocytes,  Absolute 0.58 (L) 0.70 - 3.10 10*3/mm3    Monocytes, Absolute 0.59 0.10 - 0.90 10*3/mm3    Eosinophils, Absolute 0.09 0.00 - 0.40 10*3/mm3    Basophils, Absolute 0.05 0.00 - 0.20 10*3/mm3    Immature Grans, Absolute 0.02 0.00 - 0.05 10*3/mm3    nRBC 0.0 0.0 - 0.2 /100 WBC       If labs were ordered, I independently reviewed the results and considered them in treating the patient.        RADIOLOGY  No Radiology Exams Resulted Within Past 24 Hours    I ordered and independently reviewed the above noted radiographic studies.      I viewed images of x-ray right hand which showed no acute bony abnormality per my independent interpretation.    See radiologist's dictation for official interpretation.        PROCEDURES    Procedures    No orders to display       MEDICATIONS GIVEN IN ER    Medications   cefTRIAXone (ROCEPHIN) 1,000 mg in sodium chloride 0.9 % 100 mL IVPB-VTB (1,000 mg Intravenous New Bag 10/21/24 1940)         MEDICAL DECISION MAKING, PROGRESS, and CONSULTS    All labs, if obtained, have been independently reviewed by me.  All radiology studies, if obtained, have been reviewed by me and the radiologist dictating the report.  All EKG's, if obtained, have been independently viewed and interpreted by me/my attending physician.      Discussion below represents my analysis of pertinent findings related to patient's condition, differential diagnosis, treatment plan and final disposition.    Patient is a 74-year-old male presenting to the emergency department accompanied by both his wife and brother at bedside due to concern for ongoing infection of his right finger for the past 2 days.  On exam his right fourth digit is fusiform leg swollen with some erythema and that is streaking across the posterior hand and forearm.  Due to concern for cellulitis or underlying osteomyelitis an x-ray of the right hand was ordered lab work was obtained including inflammatory markers.    CBC showed no leukocytosis, hemoglobin and  hematocrit mildly decreased.  Sedimentation rate is normal.  CMP is clinically unremarkable.  CRP is elevated at 7.1.  Given this, patient was given IV Rocephin in the emergency department I discussed with his wife at bedside and the patient and I did offer them admission for cellulitis and failure of outpatient treatment, however they feel they have not started the oral antibiotic for long enough of a period of time to have effect and they refused admission.    I discussed with them the strict ED return precautions if the patient develops any fever, worsening swelling or redness of the hand finger or forearm, or develops any other concerns he should return to the ER immediately otherwise we will change his antibiotic to Levaquin                     Differential diagnosis:    Differential diagnosis included but was not limited to cellulitis, osteomyelitis, rash      Additional sources:    - Discussed/ obtained information from independent historians: Patient's wife and patient's brother at bedside in addition to patient    - External (non-ED) record review:   I viewed your review patient's emergency department visit records from 6/18/2024 showing patient have a fall at the nursing home on that date    - Chronic or social conditions impacting care: None    Orders placed during this visit:  Orders Placed This Encounter   Procedures    XR Hand 3+ View Right    Comprehensive Metabolic Panel    C-reactive Protein    Sedimentation Rate    CBC Auto Differential    CBC & Differential         Additional orders considered but not ordered: None      ED Course:    Consultants: None                Shared Decision Making:  After my consideration of clinical presentation and any laboratory/radiology studies obtained, I discussed the findings with the patient/patient representative who is in agreement with the treatment plan and the final disposition.   Risks and benefits of discharge and/or observation/admission were discussed.        AS OF 20:22 EDT VITALS:    BP - 109/60  HR -    TEMP - 97.8 °F (36.6 °C) (Oral)  O2 SATS -                    DIAGNOSIS  Final diagnoses:   Cellulitis of finger of right hand         DISPOSITION  Discharge      Please note that portions of this document were completed with voice recognition software.      Josesito Sun PA-C  10/21/24 2022

## 2024-10-22 NOTE — DISCHARGE INSTRUCTIONS
Patient will need to return to the ER immediately if he develops fever, worsening swelling or redness of the finger worsening streaking redness on the back of the hand or forearm, or for any other concern.    Patient should continue his Keflex medication but start his doxycycline twice daily for the next 10 days as well.  If there is no improvement in the next 2 to 3 days he should return to the ER immediately for possible reevaluation for failure of outpatient treatment of cellulitis.

## 2024-10-26 ENCOUNTER — APPOINTMENT (OUTPATIENT)
Dept: GENERAL RADIOLOGY | Facility: HOSPITAL | Age: 74
End: 2024-10-26
Payer: MEDICAID

## 2024-10-26 ENCOUNTER — HOSPITAL ENCOUNTER (EMERGENCY)
Facility: HOSPITAL | Age: 74
Discharge: HOME | End: 2024-10-26
Attending: EMERGENCY MEDICINE
Payer: MEDICAID

## 2024-10-26 VITALS
BODY MASS INDEX: 23.39 KG/M2 | DIASTOLIC BLOOD PRESSURE: 58 MMHG | OXYGEN SATURATION: 100 % | WEIGHT: 149.03 LBS | SYSTOLIC BLOOD PRESSURE: 123 MMHG | RESPIRATION RATE: 18 BRPM | HEIGHT: 67 IN | TEMPERATURE: 97.8 F | HEART RATE: 81 BPM

## 2024-10-26 DIAGNOSIS — L03.011 FELON OF FINGER OF RIGHT HAND: Primary | ICD-10-CM

## 2024-10-26 DIAGNOSIS — S46.001A ROTATOR CUFF INJURY, RIGHT, INITIAL ENCOUNTER: ICD-10-CM

## 2024-10-26 LAB
ALBUMIN SERPL-MCNC: 3.6 G/DL (ref 3.5–5.2)
ALBUMIN/GLOB SERPL: 1.1 G/DL
ALP SERPL-CCNC: 145 U/L (ref 39–117)
ALT SERPL W P-5'-P-CCNC: 41 U/L (ref 1–41)
ANION GAP SERPL CALCULATED.3IONS-SCNC: 14.6 MMOL/L (ref 5–15)
AST SERPL-CCNC: 35 U/L (ref 1–40)
BASOPHILS # BLD AUTO: 0.05 10*3/MM3 (ref 0–0.2)
BASOPHILS NFR BLD AUTO: 0.9 % (ref 0–1.5)
BILIRUB SERPL-MCNC: 0.4 MG/DL (ref 0–1.2)
BUN SERPL-MCNC: 16 MG/DL (ref 8–23)
BUN/CREAT SERPL: 16.2 (ref 7–25)
CALCIUM SPEC-SCNC: 9.3 MG/DL (ref 8.6–10.5)
CHLORIDE SERPL-SCNC: 100 MMOL/L (ref 98–107)
CO2 SERPL-SCNC: 22.4 MMOL/L (ref 22–29)
CREAT SERPL-MCNC: 0.99 MG/DL (ref 0.76–1.27)
CRP SERPL-MCNC: 3.85 MG/DL (ref 0–0.5)
DEPRECATED RDW RBC AUTO: 40.4 FL (ref 37–54)
EGFRCR SERPLBLD CKD-EPI 2021: 79.9 ML/MIN/1.73
EOSINOPHIL # BLD AUTO: 0.07 10*3/MM3 (ref 0–0.4)
EOSINOPHIL NFR BLD AUTO: 1.2 % (ref 0.3–6.2)
ERYTHROCYTE [DISTWIDTH] IN BLOOD BY AUTOMATED COUNT: 12.4 % (ref 12.3–15.4)
ERYTHROCYTE [SEDIMENTATION RATE] IN BLOOD: 42 MM/HR (ref 0–20)
GLOBULIN UR ELPH-MCNC: 3.4 GM/DL
GLUCOSE SERPL-MCNC: 180 MG/DL (ref 65–99)
HCT VFR BLD AUTO: 35.4 % (ref 37.5–51)
HGB BLD-MCNC: 11.8 G/DL (ref 13–17.7)
HOLD SPECIMEN: NORMAL
HOLD SPECIMEN: NORMAL
IMM GRANULOCYTES # BLD AUTO: 0.03 10*3/MM3 (ref 0–0.05)
IMM GRANULOCYTES NFR BLD AUTO: 0.5 % (ref 0–0.5)
LYMPHOCYTES # BLD AUTO: 0.89 10*3/MM3 (ref 0.7–3.1)
LYMPHOCYTES NFR BLD AUTO: 15.2 % (ref 19.6–45.3)
MCH RBC QN AUTO: 29.5 PG (ref 26.6–33)
MCHC RBC AUTO-ENTMCNC: 33.3 G/DL (ref 31.5–35.7)
MCV RBC AUTO: 88.5 FL (ref 79–97)
MONOCYTES # BLD AUTO: 0.56 10*3/MM3 (ref 0.1–0.9)
MONOCYTES NFR BLD AUTO: 9.6 % (ref 5–12)
NEUTROPHILS NFR BLD AUTO: 4.25 10*3/MM3 (ref 1.7–7)
NEUTROPHILS NFR BLD AUTO: 72.6 % (ref 42.7–76)
NRBC BLD AUTO-RTO: 0 /100 WBC (ref 0–0.2)
PLATELET # BLD AUTO: 236 10*3/MM3 (ref 140–450)
PMV BLD AUTO: 9.9 FL (ref 6–12)
POTASSIUM SERPL-SCNC: 4.8 MMOL/L (ref 3.5–5.2)
PROT SERPL-MCNC: 7 G/DL (ref 6–8.5)
RBC # BLD AUTO: 4 10*6/MM3 (ref 4.14–5.8)
SODIUM SERPL-SCNC: 137 MMOL/L (ref 136–145)
WBC NRBC COR # BLD AUTO: 5.85 10*3/MM3 (ref 3.4–10.8)
WHOLE BLOOD HOLD COAG: NORMAL
WHOLE BLOOD HOLD SPECIMEN: NORMAL

## 2024-10-26 PROCEDURE — 25010000002 LIDOCAINE 1 % SOLUTION: Performed by: EMERGENCY MEDICINE

## 2024-10-26 PROCEDURE — 87070 CULTURE OTHR SPECIMN AEROBIC: CPT | Performed by: EMERGENCY MEDICINE

## 2024-10-26 PROCEDURE — 87077 CULTURE AEROBIC IDENTIFY: CPT | Performed by: EMERGENCY MEDICINE

## 2024-10-26 PROCEDURE — 73030 X-RAY EXAM OF SHOULDER: CPT

## 2024-10-26 PROCEDURE — 85025 COMPLETE CBC W/AUTO DIFF WBC: CPT | Performed by: EMERGENCY MEDICINE

## 2024-10-26 PROCEDURE — 85652 RBC SED RATE AUTOMATED: CPT | Performed by: EMERGENCY MEDICINE

## 2024-10-26 PROCEDURE — 86140 C-REACTIVE PROTEIN: CPT | Performed by: EMERGENCY MEDICINE

## 2024-10-26 PROCEDURE — 87186 SC STD MICRODIL/AGAR DIL: CPT | Performed by: EMERGENCY MEDICINE

## 2024-10-26 PROCEDURE — 73130 X-RAY EXAM OF HAND: CPT

## 2024-10-26 PROCEDURE — 87205 SMEAR GRAM STAIN: CPT | Performed by: EMERGENCY MEDICINE

## 2024-10-26 PROCEDURE — 80053 COMPREHEN METABOLIC PANEL: CPT | Performed by: EMERGENCY MEDICINE

## 2024-10-26 PROCEDURE — 99283 EMERGENCY DEPT VISIT LOW MDM: CPT

## 2024-10-26 RX ORDER — SODIUM CHLORIDE 0.9 % (FLUSH) 0.9 %
10 SYRINGE (ML) INJECTION AS NEEDED
Status: DISCONTINUED | OUTPATIENT
Start: 2024-10-26 | End: 2024-10-26 | Stop reason: HOSPADM

## 2024-10-26 RX ORDER — LIDOCAINE HYDROCHLORIDE 10 MG/ML
10 INJECTION, SOLUTION INFILTRATION; PERINEURAL ONCE
Status: COMPLETED | OUTPATIENT
Start: 2024-10-26 | End: 2024-10-26

## 2024-10-26 RX ADMIN — LIDOCAINE HYDROCHLORIDE 10 ML: 10 INJECTION, SOLUTION INFILTRATION; PERINEURAL at 13:44

## 2024-10-26 NOTE — DISCHARGE INSTRUCTIONS
If you notice any concerning symptoms, please return to the ER immediately. These can include but are not limited to: worsening of you condition, fevers, chills, shortness of breath, vomiting, weakness of the extremities, changes in your mental status, numbness, pale extremities, or chest pain.     Take medications as prescribed, your pharmacist may have additional recommendations concerning these medications. If you are given an antibiotic, then make sure you get the prescription filled and take the antibiotics until finished, this is important to prevent antibiotic resistant diseases.  Drink plenty of water while taking the antibiotics.  Avoid drinking alcohol or drinks that have caffeine in it while taking antibiotics.      For pain use ibuprofen (Motrin) or acetaminophen (Tylenol), unless prescribed medications that also contain these medications.  You can take over the counter acetaminophen or ibuprofen, please follow the directions as dosages differ. Do not take ibuprofen if you have a history of peptic ulcers, kidney disease, bariatric surgery, or are currently pregnant.  Do not take Tylenol if you have a history of liver disease or alcohol use disorder.        THANK YOU!!! From [facility] Emergency Department    On behalf of the Emergency Department staff at [facility], I would like to thank you for giving us the opportunity to address your health care needs and concerns. We hope that during your visit, our service was delivered in a professional and caring manner. Please keep [facility] in mind as we walk with you down the path to your own personal wellness. Please expect follow-up phone calls concerning additional care and questions about your experience.      You have received additional information specific to your diagnosis in these discharge instructions, please read these fully.  Anytime you have been seen in the emergency department we recommend close follow up with your  primary care provider or specialist, please follow these directions as indicated.      Please continue your doxycycline and Keflex antibiotics.  Please follow closely with hand as I have concerns for worsening infection.  Please return emerged department if it starts to move into the palmar surface of your hand with pain or redness as this could be concerning for severe infection.  Please keep the area clean.  It may bleed a little bit as the incision and drainage may weep.

## 2024-10-26 NOTE — ED PROVIDER NOTES
[facility] EMERGENCY DEPARTMENT  Emergency Department Encounter  Emergency Medicine Physician Note     Pt Name:Imtiaz Cabezas  MRN: [id number]  Birthdate 1950  Date of evaluation: 10/26/2024  PCP:  Provider, No Known  Note Started: 12:00 PM EDT      CHIEF COMPLAINT       Chief Complaint   Patient presents with    Wound Check       HISTORY OF PRESENT ILLNESS  (Location/Symptom, Timing/Onset, Context/Setting, Quality, Duration, Modifying Factors, Severity.)      Imtiaz Cabezas is a 74 y.o. male who presents with right hand infection.  Patient was evaluated the other day for concerns of right finger infection and was started on antibiotics.  Patient's had progressively worsening inflammation and infection of the right hand.  Patient also describes frequent falls, has a right shoulder pain, does state that he had previous x-ray on this but does not know the results.  Per patient's wife he falls all the time, no new lightheadedness dizziness or chest pain.    PAST MEDICAL / SURGICAL / SOCIAL / FAMILY HISTORY     Past Medical History:   Diagnosis Date    Arthritis     Coronary artery disease involving coronary bypass graft of native heart without angina pectoris 01/01/2016     bypass x3 in January 2012 at [place] (LIMA to the left anterior descending, SVG to right coronary artery, SVG to OM), a stent to the right coronary artery in January 2016    Diabetes     Heart attack     High blood pressure     Impaired mobility     Parkinson disease      No additional pertinent       Past Surgical History:   Procedure Laterality Date    ARTERIAL BYPASS SURGERY      BACK SURGERY      CARDIAC SURGERY      CAROTID STENT      HIP SURGERY      INSERT / REPLACE / REMOVE PACEMAKER      PACEMAKER IMPLANTATION       No additional pertinent       Social History     Socioeconomic History    Marital status:    Tobacco Use    Smoking status: Never    Smokeless tobacco: Never   Vaping Use    Vaping status: Never  Used   Substance and Sexual Activity    Alcohol use: Never    Drug use: Never    Sexual activity: Defer       Family History   Problem Relation Age of Onset    Parkinsonism Other     Diabetes Other     Other Mother         mitral valve replacement       Allergies:  Codeine    Home Medications:  Prior to Admission medications    Medication Sig Start Date End Date Taking? Authorizing Provider   amantadine (SYMMETREL) 100 MG capsule Take 1 capsule by mouth 2 (Two) Times a Day.    Merly Robledo MD   clopidogrel (PLAVIX) 75 MG tablet Take 1 tablet by mouth Daily.    Merly Robledo MD   dapagliflozin Propanediol (Farxiga) 10 MG tablet Take  by mouth.    Merly Robledo MD   dextrose (GLUTOSE) 40 % gel Take 15 g by mouth Every 15 (Fifteen) Minutes As Needed for Low Blood Sugar (Blood sugar less than 70). 5/9/24   Catia Mcclain APRN   doxycycline (VIBRAMYCIN) 100 MG capsule Take 1 capsule by mouth 2 (Two) Times a Day for 10 days. 10/21/24 10/31/24  Josesito Sun PA-C   hydrOXYzine (ATARAX) 10 MG tablet Take 1 tablet by mouth 2 (Two) Times a Day.    Merly Robledo MD   insulin detemir (LEVEMIR) 100 UNIT/ML injection Inject 10 Units under the skin into the appropriate area as directed 2 (Two) Times a Day. Titrate up for continued hyperglycemia. 5/10/24   Catia Mcclain APRN   latanoprost (XALATAN) 0.005 % ophthalmic solution Administer 1 drop to both eyes Every Night.    Merly Robledo MD   lisinopril (PRINIVIL,ZESTRIL) 2.5 MG tablet Take 1 tablet by mouth Daily.    Merly Robledo MD   mirtazapine (REMERON SOL-TAB) 30 MG disintegrating tablet Place 1 tablet on the tongue Every Night.    Merly Robledo MD   polyethylene glycol (MiraLax) 17 g packet Take 17 g by mouth Daily.    Merly Robledo MD   pramipexole (MIRAPEX) 0.5 MG tablet Take 1 tablet by mouth 3 (Three) Times a Day.    Merly Robledo MD   QUEtiapine (SEROquel) 100 MG tablet Take 1.5 tablets by  "mouth Every Night.    Provider, MD Merly   rosuvastatin (CRESTOR) 5 MG tablet Take 1 tablet by mouth Daily.  Patient taking differently: Take 4 tablets by mouth Daily. 5/15/24   Anshul Xie MD   tamsulosin (FLOMAX) 0.4 MG capsule 24 hr capsule Take 1 capsule by mouth Daily.    ProviderMerly MD   vitamin D3 125 MCG (5000 UT) capsule capsule Take 1 capsule by mouth Daily.    ProviderMerly MD         REVIEW OF SYSTEMS       Review of Systems   Musculoskeletal:  Positive for arthralgias (4th digit right hand).   Skin:  Positive for color change and wound.       PHYSICAL EXAM      INITIAL VITALS:   /58   Pulse 81   Temp 97.8 °F (36.6 °C)   Resp 18   Ht 170.2 cm (67.01\")   Wt 67.6 kg (149 lb 0.5 oz)   SpO2 100%   BMI 23.34 kg/m²     Physical Exam  Constitutional:       Appearance: Normal appearance.   HENT:      Head: Normocephalic and atraumatic.   Eyes:      Extraocular Movements: Extraocular movements intact.   Cardiovascular:      Rate and Rhythm: Normal rate and regular rhythm.      Pulses: Normal pulses.      Comments: Patient with 2+ radial pulses in the right hand, appropriate cap refill in all fingers difficult to assess the fourth finger due to the swelling and erythema.  Pulmonary:      Effort: Pulmonary effort is normal.   Musculoskeletal:         General: Swelling and tenderness (4th digit right hand.) present.      Comments: Patient with no tenderness to palpation of the tendon into the palmar surface.  Redness does not pass the MCP joint.  Patient does have a large amount of pus and swelling to the pad of the fourth digit of the right hand, concerning for felon and severe infection.   Skin:     General: Skin is warm and dry.      Findings: Bruising (4th digit right hand) and erythema present.   Neurological:      General: No focal deficit present.      Mental Status: He is alert and oriented to person, place, and time.   Psychiatric:         Mood and Affect: Mood " normal.         Behavior: Behavior normal.           DDX/DIAGNOSTIC RESULTS / EMERGENCY DEPARTMENT COURSE / MDM     Differential Diagnosis included but not limited: Felon, abscess, tenosynovitis, osteomyelitis.  MRSA infection, complicated infection secondary to diabetes.    Diagnoses Considered but Do Not Suspect:  tenosynovitis, no pain over the flexor tendon with palpation.  Do not suspect syncopal episode.    Decision Rules/Scores utilized: N/A     Tests considered but not ordered and why:  N/A     MIPS: N/A     Code Status Discussion:  Not Discussed    Additional Patient Education Provided: None     Medical Decision Making    Medical Decision Making  Patient presenting with swelling of the finger with on the right hand.  Noted to be the fourth digit.  Patient has worsening erythema, worsening swelling, change in color of the distal pad.  This is concerning for a felon.  Patient also noted to have a fall, these are frequent falls and according to wife is unchanged.  Patient also has had progressively worsening confusion that is also unchanged.  Patient afebrile, no signs of systemic infection including sepsis.  General labs were obtained that demonstrated no complicated course at this time.  Some inflammatory markers improved and others worsened.  Plan for incision and drainage of the felon as I feel like this is definitive management for patient's symptoms.  Did discuss this with the patient, provided risks and benefits.  Patient patient's wife were agreeable with the incision and drainage.  Culture was obtained.  Patient to continue antibiotics.  Due to the concern of the infection I have low concern for tenosynovitis at this time with patient's diabetic history and the appearance of the infection do feel it is important for patient to follow-up with hand surgery.  Did discuss with patient that I have concerns that if the infection worsens gets in the bone the patient may have an severe infection that may  require possible even amputation if it is not take care of correctly.  Heavily encouraged him to follow-up with hand surgery.  Reached out to  hand who will get them appointment this week.  Family is agreeable with the plan, does have family coming in from [place] will be able to take patient to the appointment.  Continued his antibiotics as that should be appropriate if it is MRSA and/or other infectious processes.  Do feel patient was stable for discharge, no indication for admission or IV antibiotics at this time as I think outpatient management and close follow-up with hand surgery is appropriate at this time.  Did encourage them to return if they have any tenderness, redness moving into the palmar surface, worsening signs of infection that are improving after the incision and drainage or any other concerns.  Patient's wife and patient were agreeable with this.  Patient sent back to nursing facility.    Problems Addressed:  Felon of finger of right hand: complicated acute illness or injury  Rotator cuff injury, right, initial encounter: complicated acute illness or injury    Amount and/or Complexity of Data Reviewed  Independent Historian: spouse     Details: Spouse provided some history.  External Data Reviewed: labs.     Details: Previous CRP and sed rate evaluated, notes from previous evaluation evaluated  Labs: ordered.  Radiology: ordered.  Discussion of management or test interpretation with external provider(s): Discussed patient's case with  hand/orthopedic surgery they will evaluate later this week in outpatient clinic.    Risk  Prescription drug management.        See ED COURSE for additional MDM statements    EKG  None Performed     All EKG's are interpreted by the Emergency Department Physician who either signs or Co-signs this chart in the absence of a cardiologist.    Additional Scores                   EMERGENCY DEPARTMENT COURSE:    ED Course as of 10/28/24 0136   Sat Oct 26, 2024   9182  Incision and drainage performed after verbal consent, low amount of pus expressed, incision was placed on the ulnar aspect of the finger.  Culture obtained.  Plan to have patient follow closely with hand due to the extensive nature of the infection. [CR]   1505 Patient is directed to continue keflex and doxycycline.  Patient instructed have close follow-up with hand as I have concerns for worsening infection.  Patient educated on tenosynovitis, instructed that if he has pain with moving into the palmar surface, red streaking, worsening signs of infection he needs to return here to the emergency department.  Heavily emphasized the risk of worsening infection and complicated course due to the amount infection he has in the phone at this time and the severity of finger infections and the importance of following up with hand.  Patient and patient's wife are agreeable. [CR]   1527 Patient with worsening sed rate, CRP is noted to be improved.  Do feel patient can continue oral antibiotics at this time.  Patient needs to follow-up with hand. [CR]      ED Course User Index  [CR] Alfredo Lujan DO       PROCEDURES:    Incision & Drainage    Date/Time: 10/26/2024 3:01 PM    Performed by: Alfredo Lujan DO  Authorized by: Alfredo Lujan DO    Consent:     Consent obtained:  Verbal    Consent given by:  Patient    Risks, benefits, and alternatives were discussed: yes      Risks discussed:  Incomplete drainage, infection and pain    Alternatives discussed:  No treatment  Universal protocol:     Procedure explained and questions answered to patient or proxy's satisfaction: yes    Location:     Location:  Upper extremity    Upper extremity location:  Finger    Finger location:  R ring finger  Pre-procedure details:     Skin preparation:  Povidone-iodine  Sedation:     Sedation type:  None  Anesthesia:     Anesthesia method:  Local infiltration    Local anesthetic:  Lidocaine 1% w/o epi (Ring  block placed.)  Procedure type:     Complexity:  Simple  Procedure details:     Ultrasound guidance: no      Needle aspiration: no      Incision types:  Single straight    Incision depth:  Submucosal    Wound management:  Probed and deloculated, irrigated with saline and extensive cleaning    Drainage:  Bloody and purulent    Drainage amount:  Moderate    Wound treatment:  Wound left open    Packing materials:  None  Post-procedure details:     Procedure completion:  Tolerated well, no immediate complications  Comments:      1-1/2 cm incision placed on the pad on the ulnar surface parallel to the bone, some purulent drainage and some bloody drainage obtained.  Wound left open for drainage.  Some mild tearing of the skin was noted during expression.  Concern for worsening infection and mild necrotic tissue.  Patient to follow-up with hand.      DATA FOR LAB AND RADIOLOGY TESTS ORDERED BELOW ARE REVIEWED BY THE ED CLINICIAN:    RADIOLOGY: All x-rays, CT, MRI, and formal ultrasound images (except ED bedside ultrasound) are read by the radiologist, see reports below, unless otherwise noted in MDM or here.  Reports below are reviewed by myself.  XR Shoulder 2+ View Right   Final Result   1. No acute fracture or dislocation.   2. Findings suggesting a rotator cuff tendon tear. Consider MRI in the   nonemergent setting.   3. Degenerative joint disease.           This report was signed and finalized on 10/26/2024 1:10 PM by Opal Baires MD.          XR Hand 3+ View Right   Final Result   No radiographic evidence of osteomyelitis. Worsening soft   tissue edema of the fourth finger.           This report was signed and finalized on 10/26/2024 1:08 PM by Opal Baires MD.              LABS: Lab orders shown below, the results are reviewed by myself, and all abnormals are listed below.  Labs Reviewed   WOUND CULTURE - Abnormal; Notable for the following components:       Result Value    Wound Culture Light growth (2+)  Staphylococcus aureus (*)     All other components within normal limits   COMPREHENSIVE METABOLIC PANEL - Abnormal; Notable for the following components:    Glucose 180 (*)     Alkaline Phosphatase 145 (*)     All other components within normal limits    Narrative:     GFR Normal >60  Chronic Kidney Disease <60  Kidney Failure <15    The GFR formula is only valid for adults with stable renal function between ages 18 and 70.   CBC WITH AUTO DIFFERENTIAL - Abnormal; Notable for the following components:    RBC 4.00 (*)     Hemoglobin 11.8 (*)     Hematocrit 35.4 (*)     Lymphocyte % 15.2 (*)     All other components within normal limits   C-REACTIVE PROTEIN - Abnormal; Notable for the following components:    C-Reactive Protein 3.85 (*)     All other components within normal limits   SEDIMENTATION RATE - Abnormal; Notable for the following components:    Sed Rate 42 (*)     All other components within normal limits   RAINBOW DRAW    Narrative:     The following orders were created for panel order [place] Draw.  Procedure                               Abnormality         Status                     ---------                               -----------         ------                     Green Top (Gel)[[id number]]                                  Final result               Lavender Top[[id number]]                                     Final result               Gold Top - SST[[id number]]                                   Final result               Light Blue Top[[id number]]                                   Final result                 Please view results for these tests on the individual orders.   CBC AND DIFFERENTIAL    Narrative:     The following orders were created for panel order CBC & Differential.  Procedure                               Abnormality         Status                     ---------                               -----------         ------                     CBC Auto Differential[[id number]]        Abnormal             Final result                 Please view results for these tests on the individual orders.   GREEN TOP   LAVENDER TOP   GOLD TOP - SST   LIGHT BLUE TOP       Vitals Reviewed:    Vitals:    10/26/24 1328 10/26/24 1359 10/26/24 1428 10/26/24 1635   BP: 128/68 124/75 131/70 123/58   Pulse:    81   Resp:    18   Temp:       SpO2: 100% 99% 100% 100%   Weight:       Height:           MEDICATIONS GIVEN TO PATIENT THIS ENCOUNTER:  Medications   lidocaine (XYLOCAINE) 1 % injection 10 mL (10 mL Injection Given 10/26/24 1344)       CONSULTS:  None    CRITICAL CARE:  There was significant risk of life threatening deterioration of patient's condition requiring my direct management. Critical care time 0 minutes, excluding any documented procedures.    FINAL IMPRESSION      1. Felon of finger of right hand    2. Rotator cuff injury, right, initial encounter          DISPOSITION / PLAN     ED Disposition       ED Disposition   Discharge    Condition   Stable    Comment   --               PATIENT REFERRED TO:   HAND SURGERY  [address] [facility] [address] [phone]  In 2 days  They should call you with an appointment next week.    [facility] ORTHOPEDICS PROVIDER  [address] [facility] [address] [phone]          DISCHARGE MEDICATIONS:     Medication List        START taking these medications      cephalexin 500 MG capsule  Commonly known as: KEFLEX  Take 1 capsule by mouth 2 (Two) Times a Day for 7 days.            CHANGE how you take these medications      * doxycycline 100 MG capsule  Commonly known as: VIBRAMYCIN  Take 1 capsule by mouth 2 (Two) Times a Day for 10 days.  What changed: Another medication with the same name was added. Make sure you understand how and when to take each.     * doxycycline 100 MG capsule  Commonly known as: VIBRAMYCIN  Take 1 capsule by mouth 2 (Two) Times a Day for 7 days. After completing the initial course of antibiotics  What changed: You  were already taking a medication with the same name, and this prescription was added. Make sure you understand how and when to take each.     rosuvastatin 5 MG tablet  Commonly known as: CRESTOR  Take 1 tablet by mouth Daily.  What changed: how much to take           * This list has 2 medication(s) that are the same as other medications prescribed for you. Read the directions carefully, and ask your doctor or other care provider to review them with you.                CONTINUE taking these medications      amantadine 100 MG capsule  Commonly known as: SYMMETREL     clopidogrel 75 MG tablet  Commonly known as: PLAVIX     dextrose 40 % gel  Commonly known as: GLUTOSE  Take 15 g by mouth Every 15 (Fifteen) Minutes As Needed for Low Blood Sugar (Blood sugar less than 70).     Farxiga 10 MG tablet  Generic drug: dapagliflozin Propanediol     hydrOXYzine 10 MG tablet  Commonly known as: ATARAX     insulin detemir 100 UNIT/ML injection  Commonly known as: LEVEMIR  Inject 10 Units under the skin into the appropriate area as directed 2 (Two) Times a Day. Titrate up for continued hyperglycemia.     latanoprost 0.005 % ophthalmic solution  Commonly known as: XALATAN     lisinopril 2.5 MG tablet  Commonly known as: PRINIVIL,ZESTRIL     MiraLax 17 g packet  Generic drug: polyethylene glycol     mirtazapine 30 MG disintegrating tablet  Commonly known as: REMERON SOL-TAB     pramipexole 0.5 MG tablet  Commonly known as: MIRAPEX     QUEtiapine 100 MG tablet  Commonly known as: SEROquel     tamsulosin 0.4 MG capsule 24 hr capsule  Commonly known as: FLOMAX     vitamin D3 125 MCG (5000 UT) capsule capsule               Where to Get Your Medications        These medications were sent to [id number] - [place], [place] - [address] - [phone]  - [phone] FX  [address] [place] [address] [place] [address]      Phone: [phone]   cephalexin 500 MG capsule  doxycycline 100 MG capsule         Electronically signed by  Alfredo Lujan DO, 10/26/24, 12:00 PM EDT.    Emergency Medicine Physician  [facility]  (Please note that portions of thisnote were completed with a voice recognition program.  Efforts were made to edit the dictations but occasionally words are mis-transcribed.)       Alfredo Lujan,   10/28/24 0142

## 2024-10-28 ENCOUNTER — TELEPHONE (OUTPATIENT)
Dept: EMERGENCY DEPT | Facility: HOSPITAL | Age: 74
End: 2024-10-28
Payer: MEDICAID

## 2024-10-28 LAB
BACTERIA SPEC AEROBE CULT: ABNORMAL
GRAM STN SPEC: ABNORMAL
GRAM STN SPEC: ABNORMAL

## 2024-10-28 RX ORDER — CEPHALEXIN 500 MG/1
500 CAPSULE ORAL 2 TIMES DAILY
Qty: 14 CAPSULE | Refills: 0 | Status: SHIPPED | OUTPATIENT
Start: 2024-10-28 | End: 2024-11-04

## 2024-10-28 RX ORDER — DOXYCYCLINE 100 MG/1
100 CAPSULE ORAL 2 TIMES DAILY
Qty: 14 CAPSULE | Refills: 0 | Status: SHIPPED | OUTPATIENT
Start: 2024-10-28 | End: 2024-11-04

## 2024-12-23 ENCOUNTER — HOSPITAL ENCOUNTER (EMERGENCY)
Facility: HOSPITAL | Age: 74
Discharge: ANOTHER HOSPITAL | End: 2024-12-23
Attending: STUDENT IN AN ORGANIZED HEALTH CARE EDUCATION/TRAINING PROGRAM | Admitting: STUDENT IN AN ORGANIZED HEALTH CARE EDUCATION/TRAINING PROGRAM
Payer: MEDICAID

## 2024-12-23 VITALS
SYSTOLIC BLOOD PRESSURE: 129 MMHG | HEART RATE: 89 BPM | DIASTOLIC BLOOD PRESSURE: 72 MMHG | OXYGEN SATURATION: 100 % | RESPIRATION RATE: 16 BRPM | HEIGHT: 67 IN | BODY MASS INDEX: 23.18 KG/M2 | WEIGHT: 147.71 LBS | TEMPERATURE: 97.9 F

## 2024-12-23 DIAGNOSIS — E16.2 HYPOGLYCEMIA: Primary | ICD-10-CM

## 2024-12-23 DIAGNOSIS — R74.01 ELEVATED TRANSAMINASE LEVEL: ICD-10-CM

## 2024-12-23 LAB
ALBUMIN SERPL-MCNC: 3.7 G/DL (ref 3.5–5.2)
ALBUMIN/GLOB SERPL: 1.1 G/DL
ALP SERPL-CCNC: 169 U/L (ref 39–117)
ALT SERPL W P-5'-P-CCNC: 46 U/L (ref 1–41)
ANION GAP SERPL CALCULATED.3IONS-SCNC: 9.3 MMOL/L (ref 5–15)
AST SERPL-CCNC: 48 U/L (ref 1–40)
BASOPHILS # BLD AUTO: 0.03 10*3/MM3 (ref 0–0.2)
BASOPHILS NFR BLD AUTO: 0.4 % (ref 0–1.5)
BILIRUB SERPL-MCNC: 0.4 MG/DL (ref 0–1.2)
BUN SERPL-MCNC: 17 MG/DL (ref 8–23)
BUN/CREAT SERPL: 17 (ref 7–25)
CALCIUM SPEC-SCNC: 9 MG/DL (ref 8.6–10.5)
CHLORIDE SERPL-SCNC: 103 MMOL/L (ref 98–107)
CO2 SERPL-SCNC: 25.7 MMOL/L (ref 22–29)
CREAT SERPL-MCNC: 1 MG/DL (ref 0.76–1.27)
DEPRECATED RDW RBC AUTO: 44.5 FL (ref 37–54)
EGFRCR SERPLBLD CKD-EPI 2021: 79 ML/MIN/1.73
EOSINOPHIL # BLD AUTO: 0.04 10*3/MM3 (ref 0–0.4)
EOSINOPHIL NFR BLD AUTO: 0.6 % (ref 0.3–6.2)
ERYTHROCYTE [DISTWIDTH] IN BLOOD BY AUTOMATED COUNT: 13.4 % (ref 12.3–15.4)
GLOBULIN UR ELPH-MCNC: 3.5 GM/DL
GLUCOSE BLDC GLUCOMTR-MCNC: 104 MG/DL (ref 70–130)
GLUCOSE BLDC GLUCOMTR-MCNC: 113 MG/DL (ref 70–130)
GLUCOSE BLDC GLUCOMTR-MCNC: 80 MG/DL (ref 70–130)
GLUCOSE SERPL-MCNC: 120 MG/DL (ref 65–99)
HCT VFR BLD AUTO: 36 % (ref 37.5–51)
HGB BLD-MCNC: 11.8 G/DL (ref 13–17.7)
IMM GRANULOCYTES # BLD AUTO: 0.02 10*3/MM3 (ref 0–0.05)
IMM GRANULOCYTES NFR BLD AUTO: 0.3 % (ref 0–0.5)
LIPASE SERPL-CCNC: 10 U/L (ref 13–60)
LYMPHOCYTES # BLD AUTO: 0.66 10*3/MM3 (ref 0.7–3.1)
LYMPHOCYTES NFR BLD AUTO: 9.5 % (ref 19.6–45.3)
MCH RBC QN AUTO: 29.5 PG (ref 26.6–33)
MCHC RBC AUTO-ENTMCNC: 32.8 G/DL (ref 31.5–35.7)
MCV RBC AUTO: 90 FL (ref 79–97)
MONOCYTES # BLD AUTO: 0.65 10*3/MM3 (ref 0.1–0.9)
MONOCYTES NFR BLD AUTO: 9.4 % (ref 5–12)
NEUTROPHILS NFR BLD AUTO: 5.52 10*3/MM3 (ref 1.7–7)
NEUTROPHILS NFR BLD AUTO: 79.8 % (ref 42.7–76)
NRBC BLD AUTO-RTO: 0 /100 WBC (ref 0–0.2)
PLATELET # BLD AUTO: 248 10*3/MM3 (ref 140–450)
PMV BLD AUTO: 9.6 FL (ref 6–12)
POTASSIUM SERPL-SCNC: 4.2 MMOL/L (ref 3.5–5.2)
PROT SERPL-MCNC: 7.2 G/DL (ref 6–8.5)
RBC # BLD AUTO: 4 10*6/MM3 (ref 4.14–5.8)
SODIUM SERPL-SCNC: 138 MMOL/L (ref 136–145)
WBC NRBC COR # BLD AUTO: 6.92 10*3/MM3 (ref 3.4–10.8)

## 2024-12-23 PROCEDURE — 36415 COLL VENOUS BLD VENIPUNCTURE: CPT

## 2024-12-23 PROCEDURE — 82948 REAGENT STRIP/BLOOD GLUCOSE: CPT | Performed by: STUDENT IN AN ORGANIZED HEALTH CARE EDUCATION/TRAINING PROGRAM

## 2024-12-23 PROCEDURE — 99283 EMERGENCY DEPT VISIT LOW MDM: CPT | Performed by: STUDENT IN AN ORGANIZED HEALTH CARE EDUCATION/TRAINING PROGRAM

## 2024-12-23 PROCEDURE — 83690 ASSAY OF LIPASE: CPT | Performed by: STUDENT IN AN ORGANIZED HEALTH CARE EDUCATION/TRAINING PROGRAM

## 2024-12-23 PROCEDURE — 82948 REAGENT STRIP/BLOOD GLUCOSE: CPT

## 2024-12-23 PROCEDURE — 80053 COMPREHEN METABOLIC PANEL: CPT | Performed by: STUDENT IN AN ORGANIZED HEALTH CARE EDUCATION/TRAINING PROGRAM

## 2024-12-23 PROCEDURE — 85025 COMPLETE CBC W/AUTO DIFF WBC: CPT | Performed by: STUDENT IN AN ORGANIZED HEALTH CARE EDUCATION/TRAINING PROGRAM

## 2024-12-23 RX ORDER — TRAMADOL HYDROCHLORIDE 50 MG/1
50 TABLET ORAL
COMMUNITY
Start: 2024-11-11

## 2024-12-23 NOTE — DISCHARGE INSTRUCTIONS
You were evaluated for low blood glucose.  This has normalized after a period of observation here in the emergency department.  Please continue to monitor home fingerstick glucose levels before meals, at bedtime, and as needed for symptoms of hyperglycemia or hypoglycemia   Do not hesitate to return if symptoms worsen or recur.

## 2024-12-23 NOTE — ED PROVIDER NOTES
[facility] EMERGENCY DEPARTMENT  Emergency Department Encounter  Emergency Medicine Physician Note       Pt Name: Imtiaz Cabezas  MRN: [id number]  Pt :   1950  Room Number:    Date of encounter:  2024  PCP: Karma Ferris APRN  ED Provider: Elpidio Martinez MD    Historian: Patient      HPI:  Chief Complaint: Hypoglycemia        Context: Imtiaz Cabezas is a 74 y.o. male who presents to the ED for hypoglycemia.  Patient arrived from [facility] for altered mental status.  It was reported to EMS that his blood glucose was 35.  He was given yogurt prior to EMS arrival.  Blood glucose on their check was 113.  It was subsequently 80 on arrival to the emergency department.  Family is at bedside states he is slightly more confused than normal.  Patient is currently on antibiotics for multiple wounds including the right ring finger as well as left leg.      PAST MEDICAL HISTORY  Past Medical History:   Diagnosis Date    Arthritis     Coronary artery disease involving coronary bypass graft of native heart without angina pectoris 2016     bypass x3 in 2012 at [place] (LIMA to the left anterior descending, SVG to right coronary artery, SVG to OM), a stent to the right coronary artery in 2016    Diabetes     Heart attack     High blood pressure     Impaired mobility     Parkinson disease          PAST SURGICAL HISTORY  Past Surgical History:   Procedure Laterality Date    ARTERIAL BYPASS SURGERY      BACK SURGERY      CARDIAC SURGERY      CAROTID STENT      HIP SURGERY      INSERT / REPLACE / REMOVE PACEMAKER      PACEMAKER IMPLANTATION           FAMILY HISTORY  Family History   Problem Relation Age of Onset    Parkinsonism Other     Diabetes Other     Other Mother         mitral valve replacement         SOCIAL HISTORY  Social History     Socioeconomic History    Marital status:    Tobacco Use    Smoking status: Never    Smokeless tobacco: Never    Vaping Use    Vaping status: Never Used   Substance and Sexual Activity    Alcohol use: Never    Drug use: Never    Sexual activity: Defer         ALLERGIES  Codeine        REVIEW OF SYSTEMS  As noted in HPI      PHYSICAL EXAM    I have reviewed the triage vital signs and nursing notes.    ED Triage Vitals   Temp Heart Rate Resp BP SpO2   12/23/24 0956 12/23/24 1000 12/23/24 0956 12/23/24 0956 12/23/24 0959   97.9 °F (36.6 °C) 82 16 109/51 98 %      Temp src Heart Rate Source Patient Position BP Location FiO2 (%)   12/23/24 0956 -- -- -- --   Oral           Physical Exam  HENT:      Head: Atraumatic.   Cardiovascular:      Rate and Rhythm: Normal rate.   Pulmonary:      Effort: Pulmonary effort is normal.   Abdominal:      General: There is no distension.      Palpations: Abdomen is soft.   Musculoskeletal:      Cervical back: Neck supple.   Skin:     Comments: Chronic erythematous wounds present to the left lower extremity over the anterior tibia as well as the right ring finger moderate amount of granulation tissue no gross purulence   Neurological:      Comments: Awake, alert, conversing appropriately, moves all extremities           LAB RESULTS  Recent Results (from the past 24 hours)   POC Glucose Once    Collection Time: 12/23/24  9:55 AM    Specimen: Blood   Result Value Ref Range    Glucose 80 70 - 130 mg/dL   Comprehensive Metabolic Panel    Collection Time: 12/23/24 10:34 AM    Specimen: Blood   Result Value Ref Range    Glucose 120 (H) 65 - 99 mg/dL    BUN 17 8 - 23 mg/dL    Creatinine 1.00 0.76 - 1.27 mg/dL    Sodium 138 136 - 145 mmol/L    Potassium 4.2 3.5 - 5.2 mmol/L    Chloride 103 98 - 107 mmol/L    CO2 25.7 22.0 - 29.0 mmol/L    Calcium 9.0 8.6 - 10.5 mg/dL    Total Protein 7.2 6.0 - 8.5 g/dL    Albumin 3.7 3.5 - 5.2 g/dL    ALT (SGPT) 46 (H) 1 - 41 U/L    AST (SGOT) 48 (H) 1 - 40 U/L    Alkaline Phosphatase 169 (H) 39 - 117 U/L    Total Bilirubin 0.4 0.0 - 1.2 mg/dL    Globulin 3.5 gm/dL    A/G  Ratio 1.1 g/dL    BUN/Creatinine Ratio 17.0 7.0 - 25.0    Anion Gap 9.3 5.0 - 15.0 mmol/L    eGFR 79.0 >60.0 mL/min/1.73   CBC Auto Differential    Collection Time: 12/23/24 10:34 AM    Specimen: Blood   Result Value Ref Range    WBC 6.92 3.40 - 10.80 10*3/mm3    RBC 4.00 (L) 4.14 - 5.80 10*6/mm3    Hemoglobin 11.8 (L) 13.0 - 17.7 g/dL    Hematocrit 36.0 (L) 37.5 - 51.0 %    MCV 90.0 79.0 - 97.0 fL    MCH 29.5 26.6 - 33.0 pg    MCHC 32.8 31.5 - 35.7 g/dL    RDW 13.4 12.3 - 15.4 %    RDW-SD 44.5 37.0 - 54.0 fl    MPV 9.6 6.0 - 12.0 fL    Platelets 248 140 - 450 10*3/mm3    Neutrophil % 79.8 (H) 42.7 - 76.0 %    Lymphocyte % 9.5 (L) 19.6 - 45.3 %    Monocyte % 9.4 5.0 - 12.0 %    Eosinophil % 0.6 0.3 - 6.2 %    Basophil % 0.4 0.0 - 1.5 %    Immature Grans % 0.3 0.0 - 0.5 %    Neutrophils, Absolute 5.52 1.70 - 7.00 10*3/mm3    Lymphocytes, Absolute 0.66 (L) 0.70 - 3.10 10*3/mm3    Monocytes, Absolute 0.65 0.10 - 0.90 10*3/mm3    Eosinophils, Absolute 0.04 0.00 - 0.40 10*3/mm3    Basophils, Absolute 0.03 0.00 - 0.20 10*3/mm3    Immature Grans, Absolute 0.02 0.00 - 0.05 10*3/mm3    nRBC 0.0 0.0 - 0.2 /100 WBC   Lipase    Collection Time: 12/23/24 10:34 AM    Specimen: Blood   Result Value Ref Range    Lipase 10 (L) 13 - 60 U/L   POC Glucose Finger Q1H    Collection Time: 12/23/24  1:45 PM    Specimen: Finger; Blood   Result Value Ref Range    Glucose 104 70 - 130 mg/dL   POC Glucose Once    Collection Time: 12/23/24  2:40 PM    Specimen: Blood   Result Value Ref Range    Glucose 113 70 - 130 mg/dL       If labs were ordered, I independently reviewed the results and considered them in treating the patient.        RADIOLOGY  No Radiology Exams Resulted Within Past 24 Hours    PROCEDURES    Procedures    No orders to display       MEDICATIONS GIVEN IN ER    Medications - No data to display      MEDICAL DECISION MAKING, PROGRESS, and CONSULTS    All labs, if obtained, have been independently reviewed by me.  All radiology  studies, if obtained, have been reviewed by me and the radiologist dictating the report.  All EKG's, if obtained, have been independently viewed and interpreted by me.      Discussion below represents my analysis of pertinent findings related to patient's condition, differential diagnosis, treatment plan and final disposition.                         Differential diagnosis:    Hypoglycemia, arrhythmia, anemia, renal insufficiency, others.      Additional sources:    - Discussed/ obtained information from independent historians: Family members at bedside    - External (non-ED) record review: [facility] surgery progress note 12/18/2024    - Chronic or social conditions impacting care: Underlying dementia    - Shared decision making:        Orders placed during this visit:  Orders Placed This Encounter   Procedures    Comprehensive Metabolic Panel    CBC Auto Differential    Lipase    POC Glucose Once    POC Glucose Finger Q1H    POC Glucose Once    CBC & Differential         Additional orders considered but not ordered:      ED Course/MDM Discussion:    Patient is a 74-year-old male who presented for hypoglycemic episode.  This occurred at [facility] unit.  Patient has a history of diabetes he is on basal bolus insulin.    DARION Andrade called facility and believes he did not receive his morning insulin however he did not eat breakfast either.  Upon arrival to the emergency department he was normoglycemic.  He does have some chronic wounds to the right finger and left lower extremity.  He is currently on antibiotics for these.    Basic labs screened demonstrated no leukocytosis.  He has mild anemia.  Creatinine is normal and electrolytes are reassuring.  He has a mild transaminase elevation however no abdominal tenderness to palpation on examination.    Patient was monitored here in the emergency department for over 5 hours with serial glucose checks.  These remained normal in the department.  Vital signs remained  stable.  Mental status was at baseline.  Given this patient deemed stable and appropriate for discharge back to facility.  Discussed plan with patient and family at bedside and they are agreeable.  Counseled on strict return and hypoglycemia precautions.                    Consultants:      Shared Decision Making:  After my consideration of clinical presentation and any laboratory/radiology studies obtained, I discussed the findings with the patient/patient representative who is in agreement with the treatment plan and the final disposition.   Risks and benefits of discharge and/or observation/admission were discussed.       AS OF 15:30 EST VITALS:    BP - 102/55  HR - 86  TEMP - 97.9 °F (36.6 °C) (Oral)  O2 SATS - 100%                  DIAGNOSIS  Final diagnoses:   Hypoglycemia   Elevated transaminase level         DISPOSITION  ED Disposition       ED Disposition   Discharge    Condition   Stable    Comment   --                   Please note that portions of this document were completed with voice recognition software.        Elpidio Martinez MD  12/25/24 7711

## 2024-12-24 ENCOUNTER — HOSPITAL ENCOUNTER (EMERGENCY)
Facility: HOSPITAL | Age: 74
Discharge: NURSING, REHAB OR LONG-TERM CARE | End: 2024-12-24
Attending: STUDENT IN AN ORGANIZED HEALTH CARE EDUCATION/TRAINING PROGRAM | Admitting: STUDENT IN AN ORGANIZED HEALTH CARE EDUCATION/TRAINING PROGRAM
Payer: MEDICAID

## 2024-12-24 ENCOUNTER — APPOINTMENT (OUTPATIENT)
Dept: GENERAL RADIOLOGY | Facility: HOSPITAL | Age: 74
End: 2024-12-24
Payer: MEDICAID

## 2024-12-24 ENCOUNTER — APPOINTMENT (OUTPATIENT)
Dept: CT IMAGING | Facility: HOSPITAL | Age: 74
End: 2024-12-24
Payer: MEDICAID

## 2024-12-24 VITALS
BODY MASS INDEX: 23.18 KG/M2 | DIASTOLIC BLOOD PRESSURE: 68 MMHG | HEART RATE: 97 BPM | RESPIRATION RATE: 18 BRPM | SYSTOLIC BLOOD PRESSURE: 125 MMHG | WEIGHT: 147.71 LBS | HEIGHT: 67 IN | TEMPERATURE: 98.2 F | OXYGEN SATURATION: 99 %

## 2024-12-24 DIAGNOSIS — T14.8XXA OPEN WOUND OF SKIN: ICD-10-CM

## 2024-12-24 DIAGNOSIS — F03.90 DEMENTIA, UNSPECIFIED DEMENTIA SEVERITY, UNSPECIFIED DEMENTIA TYPE, UNSPECIFIED WHETHER BEHAVIORAL, PSYCHOTIC, OR MOOD DISTURBANCE OR ANXIETY: Primary | ICD-10-CM

## 2024-12-24 LAB
ALBUMIN SERPL-MCNC: 3.7 G/DL (ref 3.5–5.2)
ALBUMIN/GLOB SERPL: 1 G/DL
ALP SERPL-CCNC: 169 U/L (ref 39–117)
ALT SERPL W P-5'-P-CCNC: 68 U/L (ref 1–41)
ANION GAP SERPL CALCULATED.3IONS-SCNC: 15.2 MMOL/L (ref 5–15)
AST SERPL-CCNC: 105 U/L (ref 1–40)
BACTERIA UR QL AUTO: NORMAL /HPF
BASOPHILS # BLD AUTO: 0.05 10*3/MM3 (ref 0–0.2)
BASOPHILS NFR BLD AUTO: 0.6 % (ref 0–1.5)
BILIRUB SERPL-MCNC: 0.5 MG/DL (ref 0–1.2)
BILIRUB UR QL STRIP: NEGATIVE
BUN SERPL-MCNC: 21 MG/DL (ref 8–23)
BUN/CREAT SERPL: 17.5 (ref 7–25)
CALCIUM SPEC-SCNC: 9.4 MG/DL (ref 8.6–10.5)
CHLORIDE SERPL-SCNC: 101 MMOL/L (ref 98–107)
CLARITY UR: CLEAR
CO2 SERPL-SCNC: 22.8 MMOL/L (ref 22–29)
COLOR UR: YELLOW
CREAT SERPL-MCNC: 1.2 MG/DL (ref 0.76–1.27)
CRP SERPL-MCNC: 10.41 MG/DL (ref 0–0.5)
D-LACTATE SERPL-SCNC: 1.1 MMOL/L (ref 0.5–2)
DEPRECATED RDW RBC AUTO: 44.3 FL (ref 37–54)
EGFRCR SERPLBLD CKD-EPI 2021: 63.5 ML/MIN/1.73
EOSINOPHIL # BLD AUTO: 0 10*3/MM3 (ref 0–0.4)
EOSINOPHIL NFR BLD AUTO: 0 % (ref 0.3–6.2)
ERYTHROCYTE [DISTWIDTH] IN BLOOD BY AUTOMATED COUNT: 13.4 % (ref 12.3–15.4)
FLUAV SUBTYP SPEC NAA+PROBE: NOT DETECTED
FLUBV RNA ISLT QL NAA+PROBE: NOT DETECTED
GEN 5 1HR TROPONIN T REFLEX: 335 NG/L
GLOBULIN UR ELPH-MCNC: 3.7 GM/DL
GLUCOSE BLDC GLUCOMTR-MCNC: 142 MG/DL (ref 70–130)
GLUCOSE SERPL-MCNC: 151 MG/DL (ref 65–99)
GLUCOSE UR STRIP-MCNC: ABNORMAL MG/DL
HCT VFR BLD AUTO: 38 % (ref 37.5–51)
HGB BLD-MCNC: 12.4 G/DL (ref 13–17.7)
HGB UR QL STRIP.AUTO: ABNORMAL
HYALINE CASTS UR QL AUTO: NORMAL /LPF
IMM GRANULOCYTES # BLD AUTO: 0.03 10*3/MM3 (ref 0–0.05)
IMM GRANULOCYTES NFR BLD AUTO: 0.4 % (ref 0–0.5)
KETONES UR QL STRIP: ABNORMAL
LEUKOCYTE ESTERASE UR QL STRIP.AUTO: NEGATIVE
LIPASE SERPL-CCNC: 8 U/L (ref 13–60)
LYMPHOCYTES # BLD AUTO: 0.68 10*3/MM3 (ref 0.7–3.1)
LYMPHOCYTES NFR BLD AUTO: 8.2 % (ref 19.6–45.3)
MAGNESIUM SERPL-MCNC: 2.2 MG/DL (ref 1.6–2.4)
MCH RBC QN AUTO: 29.2 PG (ref 26.6–33)
MCHC RBC AUTO-ENTMCNC: 32.6 G/DL (ref 31.5–35.7)
MCV RBC AUTO: 89.6 FL (ref 79–97)
MONOCYTES # BLD AUTO: 0.63 10*3/MM3 (ref 0.1–0.9)
MONOCYTES NFR BLD AUTO: 7.6 % (ref 5–12)
NEUTROPHILS NFR BLD AUTO: 6.93 10*3/MM3 (ref 1.7–7)
NEUTROPHILS NFR BLD AUTO: 83.2 % (ref 42.7–76)
NITRITE UR QL STRIP: NEGATIVE
NRBC BLD AUTO-RTO: 0 /100 WBC (ref 0–0.2)
NT-PROBNP SERPL-MCNC: 1333 PG/ML (ref 0–900)
PH UR STRIP.AUTO: 5.5 [PH] (ref 5–8)
PLATELET # BLD AUTO: 241 10*3/MM3 (ref 140–450)
PMV BLD AUTO: 9.8 FL (ref 6–12)
POTASSIUM SERPL-SCNC: 5 MMOL/L (ref 3.5–5.2)
PROCALCITONIN SERPL-MCNC: 0.19 NG/ML (ref 0–0.25)
PROT SERPL-MCNC: 7.4 G/DL (ref 6–8.5)
PROT UR QL STRIP: ABNORMAL
RBC # BLD AUTO: 4.24 10*6/MM3 (ref 4.14–5.8)
RBC # UR STRIP: NORMAL /HPF
REF LAB TEST METHOD: NORMAL
SARS-COV-2 RNA RESP QL NAA+PROBE: NOT DETECTED
SODIUM SERPL-SCNC: 139 MMOL/L (ref 136–145)
SP GR UR STRIP: 1.02 (ref 1–1.03)
SQUAMOUS #/AREA URNS HPF: NORMAL /HPF
TROPONIN T % DELTA: -1 %
TROPONIN T NUMERIC DELTA: -3 NG/L
TROPONIN T SERPL HS-MCNC: 338 NG/L
UROBILINOGEN UR QL STRIP: ABNORMAL
WBC # UR STRIP: NORMAL /HPF
WBC NRBC COR # BLD AUTO: 8.32 10*3/MM3 (ref 3.4–10.8)

## 2024-12-24 PROCEDURE — 83880 ASSAY OF NATRIURETIC PEPTIDE: CPT | Performed by: STUDENT IN AN ORGANIZED HEALTH CARE EDUCATION/TRAINING PROGRAM

## 2024-12-24 PROCEDURE — 93005 ELECTROCARDIOGRAM TRACING: CPT | Performed by: STUDENT IN AN ORGANIZED HEALTH CARE EDUCATION/TRAINING PROGRAM

## 2024-12-24 PROCEDURE — 83690 ASSAY OF LIPASE: CPT | Performed by: STUDENT IN AN ORGANIZED HEALTH CARE EDUCATION/TRAINING PROGRAM

## 2024-12-24 PROCEDURE — 71045 X-RAY EXAM CHEST 1 VIEW: CPT

## 2024-12-24 PROCEDURE — 81001 URINALYSIS AUTO W/SCOPE: CPT | Performed by: STUDENT IN AN ORGANIZED HEALTH CARE EDUCATION/TRAINING PROGRAM

## 2024-12-24 PROCEDURE — 84484 ASSAY OF TROPONIN QUANT: CPT | Performed by: STUDENT IN AN ORGANIZED HEALTH CARE EDUCATION/TRAINING PROGRAM

## 2024-12-24 PROCEDURE — 70450 CT HEAD/BRAIN W/O DYE: CPT

## 2024-12-24 PROCEDURE — 86140 C-REACTIVE PROTEIN: CPT | Performed by: STUDENT IN AN ORGANIZED HEALTH CARE EDUCATION/TRAINING PROGRAM

## 2024-12-24 PROCEDURE — 84145 PROCALCITONIN (PCT): CPT | Performed by: STUDENT IN AN ORGANIZED HEALTH CARE EDUCATION/TRAINING PROGRAM

## 2024-12-24 PROCEDURE — 87636 SARSCOV2 & INF A&B AMP PRB: CPT | Performed by: STUDENT IN AN ORGANIZED HEALTH CARE EDUCATION/TRAINING PROGRAM

## 2024-12-24 PROCEDURE — 36415 COLL VENOUS BLD VENIPUNCTURE: CPT

## 2024-12-24 PROCEDURE — 83605 ASSAY OF LACTIC ACID: CPT | Performed by: STUDENT IN AN ORGANIZED HEALTH CARE EDUCATION/TRAINING PROGRAM

## 2024-12-24 PROCEDURE — 83735 ASSAY OF MAGNESIUM: CPT | Performed by: STUDENT IN AN ORGANIZED HEALTH CARE EDUCATION/TRAINING PROGRAM

## 2024-12-24 PROCEDURE — 85025 COMPLETE CBC W/AUTO DIFF WBC: CPT | Performed by: STUDENT IN AN ORGANIZED HEALTH CARE EDUCATION/TRAINING PROGRAM

## 2024-12-24 PROCEDURE — 82948 REAGENT STRIP/BLOOD GLUCOSE: CPT

## 2024-12-24 PROCEDURE — 99284 EMERGENCY DEPT VISIT MOD MDM: CPT | Performed by: STUDENT IN AN ORGANIZED HEALTH CARE EDUCATION/TRAINING PROGRAM

## 2024-12-24 PROCEDURE — 80053 COMPREHEN METABOLIC PANEL: CPT | Performed by: STUDENT IN AN ORGANIZED HEALTH CARE EDUCATION/TRAINING PROGRAM

## 2024-12-24 NOTE — ED PROVIDER NOTES
Subjective:  History of Present Illness:    Patient is a 74-year-old male with history of CAD, diabetes mellitus, hypertension, Parkinson's disease, chronic wounds to the finger as well as the bilateral legs currently on outpatient antibiotics who presents back today with concerns for altered mental status.  Had been seen in our emergency department yesterday due to concerns for altered mental status with hypoglycemia.  Have been evaluated in our emergency department for this and was ultimately discharged.  Patient apparently has history of transient alteration of awareness and history of dementia.  He now presents back this morning due to concerns for altered mental status.  Patient's chronic wounds appear baseline per nurses familiar yesterday.  No fevers reported.  Patient's glucose is normal.  He is following commands at the bedside.      Nurses Notes reviewed and agree, including vitals, allergies, social history and prior medical history.     REVIEW OF SYSTEMS: All systems reviewed and not pertinent unless noted.  Review of Systems   Unable to perform ROS: Dementia       Past Medical History:   Diagnosis Date    Arthritis     Coronary artery disease involving coronary bypass graft of native heart without angina pectoris 01/01/2016     bypass x3 in January 2012 at [facility] (LIMA to the left anterior descending, SVG to right coronary artery, SVG to OM), a stent to the right coronary artery in January 2016    Diabetes     Heart attack     High blood pressure     Impaired mobility     Parkinson disease        Allergies:    Codeine      Past Surgical History:   Procedure Laterality Date    ARTERIAL BYPASS SURGERY      BACK SURGERY      CARDIAC SURGERY      CAROTID STENT      HIP SURGERY      INSERT / REPLACE / REMOVE PACEMAKER      PACEMAKER IMPLANTATION           Social History     Socioeconomic History    Marital status:    Tobacco Use    Smoking status: Never    Smokeless tobacco: Never   Vaping Use     "Vaping status: Never Used   Substance and Sexual Activity    Alcohol use: Never    Drug use: Never    Sexual activity: Defer         Family History   Problem Relation Age of Onset    Parkinsonism Other     Diabetes Other     Other Mother         mitral valve replacement       Objective  Physical Exam:  /68   Pulse 97   Temp 98.2 °F (36.8 °C) (Oral)   Resp 18   Ht 170.2 cm (67\")   Wt 67 kg (147 lb 11.3 oz)   SpO2 99%   BMI 23.13 kg/m²      Physical Exam  Constitutional:       General: He is not in acute distress.     Appearance: Normal appearance. He is normal weight. He is ill-appearing. He is not toxic-appearing or diaphoretic.   HENT:      Head: Normocephalic and atraumatic.      Nose: Nose normal. No congestion or rhinorrhea.      Mouth/Throat:      Mouth: Mucous membranes are moist.      Pharynx: Oropharynx is clear.   Eyes:      Extraocular Movements: Extraocular movements intact.      Conjunctiva/sclera: Conjunctivae normal.      Pupils: Pupils are equal, round, and reactive to light.   Cardiovascular:      Rate and Rhythm: Normal rate and regular rhythm.      Pulses: Normal pulses.   Pulmonary:      Effort: Pulmonary effort is normal. No respiratory distress.      Breath sounds: Normal breath sounds.   Abdominal:      General: Abdomen is flat. Bowel sounds are normal. There is no distension.      Palpations: Abdomen is soft.      Tenderness: There is no abdominal tenderness.   Musculoskeletal:         General: No swelling or tenderness. Normal range of motion.      Cervical back: Normal range of motion and neck supple. No rigidity or tenderness.      Comments: Patient with chronic wound to the right hand, ischemia noted with no overlying cellulitis, chronic wound, patient also with wounds to bilateral lower extremities.  Both wounds appear to have leaves that a been placed on them as well as an unknown salve over them.  No obvious overlying cellulitis   Skin:     General: Skin is warm and dry.    "   Capillary Refill: Capillary refill takes less than 2 seconds.   Neurological:      General: No focal deficit present.      Mental Status: He is alert. Mental status is at baseline. He is confused.      Cranial Nerves: No cranial nerve deficit.      Sensory: No sensory deficit.      Motor: No weakness.      Comments: ANO x 2 at this time, following commands, no focal weakness   Psychiatric:         Mood and Affect: Mood normal.         Behavior: Behavior normal.         Thought Content: Thought content normal.         Judgment: Judgment normal.         Procedures    ED Course:    ED Course as of 12/24/24 1107   Tue Dec 24, 2024   0844 EKG interpreted by me, normal sinus rhythm with no concerning ST changes noted, left axis deviation, rate of 96 [JE]   0928 Chest x-ray independently interpreted by me showing no acute process [JE]   1042 I went to bedside to discuss patient's workup today and goals of care.  Family at bedside would prefer for patient to be at his assisted living facility and to be made as comfortable as possible without recurrent visits to the hospital.  They do not believe patient's would want any further painful interventions or surgery on his legs.  They also declined any further workup for patient's troponin elevation.  States that he would not want a heart catheterization.  Given this, discussed hospice with family who is receptive to this.  Contacted hospice who will follow-up with patient in a few days.  In the meantime, we will send patient back to his skilled nursing facility. [JE]      ED Course User Index  [JE] Remy Pendleton MD       Lab Results (last 24 hours)       Procedure Component Value Units Date/Time    POC Glucose Finger Q1H [[id number]]  (Normal) Collected: 12/23/24 1345    Specimen: Blood from Finger Updated: 12/23/24 1347     Glucose 104 mg/dL      Comment: Serial Number: [id number]:  [id number]       POC Glucose Once [[id number]]  (Normal) Collected: 12/23/24 1440     Specimen: Blood Updated: 12/23/24 1443     Glucose 113 mg/dL      Comment: Serial Number: [id number]:  [id number]       COVID-19 and FLU A/B PCR, 1 HR TAT - Swab, Nasopharynx [[id number]]  (Normal) Collected: 12/24/24 0835    Specimen: Swab from Nasopharynx Updated: 12/24/24 0912     COVID19 Not Detected     Influenza A PCR Not Detected     Influenza B PCR Not Detected    Narrative:      Fact sheet for providers: https://www.fda.gov/media/104491/download    Fact sheet for patients: https://www.fda.gov/media/126124/download    Test performed by PCR.    CBC & Differential [[id number]]  (Abnormal) Collected: 12/24/24 0846    Specimen: Blood Updated: 12/24/24 0853    Narrative:      The following orders were created for panel order CBC & Differential.  Procedure                               Abnormality         Status                     ---------                               -----------         ------                     CBC Auto Differential[[id number]]        Abnormal            Final result                 Please view results for these tests on the individual orders.    Comprehensive Metabolic Panel [[id number]]  (Abnormal) Collected: 12/24/24 0846    Specimen: Blood Updated: 12/24/24 0916     Glucose 151 mg/dL      BUN 21 mg/dL      Creatinine 1.20 mg/dL      Sodium 139 mmol/L      Potassium 5.0 mmol/L      Chloride 101 mmol/L      CO2 22.8 mmol/L      Calcium 9.4 mg/dL      Total Protein 7.4 g/dL      Albumin 3.7 g/dL      ALT (SGPT) 68 U/L      AST (SGOT) 105 U/L      Alkaline Phosphatase 169 U/L      Total Bilirubin 0.5 mg/dL      Globulin 3.7 gm/dL      A/G Ratio 1.0 g/dL      BUN/Creatinine Ratio 17.5     Anion Gap 15.2 mmol/L      eGFR 63.5 mL/min/1.73     Narrative:      GFR Categories in Chronic Kidney Disease (CKD)      GFR Category          GFR (mL/min/1.73)    Interpretation  G1                     90 or greater         Normal or high (1)  G2                      60-89                Mild decrease  (1)  G3a                   45-59                Mild to moderate decrease  G3b                   30-44                Moderate to severe decrease  G4                    15-29                Severe decrease  G5                    14 or less           Kidney failure          (1)In the absence of evidence of kidney disease, neither GFR category G1 or G2 fulfill the criteria for CKD.    eGFR calculation 2021 CKD-EPI creatinine equation, which does not include race as a factor    Lipase [[id number]]  (Abnormal) Collected: 12/24/24 0846    Specimen: Blood Updated: 12/24/24 0916     Lipase 8 U/L     High Sensitivity Troponin T [[id number]]  (Abnormal) Collected: 12/24/24 0846    Specimen: Blood Updated: 12/24/24 0934     HS Troponin T 338 ng/L     Narrative:      High Sensitive Troponin T Reference Range:  <14.0 ng/L- Negative Female for AMI  <22.0 ng/L- Negative Male for AMI  >=14 - Abnormal Female indicating possible myocardial injury.  >=22 - Abnormal Male indicating possible myocardial injury.   Clinicians would have to utilize clinical acumen, EKG, Troponin, and serial changes to determine if it is an Acute Myocardial Infarction or myocardial injury due to an underlying chronic condition.         BNP [[id number]]  (Abnormal) Collected: 12/24/24 0846    Specimen: Blood Updated: 12/24/24 0921     proBNP 1,333.0 pg/mL     Narrative:      This assay is used as an aid in the diagnosis of individuals suspected of having heart failure. It can be used as an aid in the diagnosis of acute decompensated heart failure (ADHF) in patients presenting with signs and symptoms of ADHF to the emergency department (ED). In addition, NT-proBNP of <300 pg/mL indicates ADHF is not likely.    Age Range Result Interpretation  NT-proBNP Concentration (pg/mL:      <50             Positive            >450                   Gray                 300-450                    Negative             <300    50-75           Positive            >900            "       Stone                300-900                  Negative            <300      >75             Positive            >1800                  Gray                300-1800                  Negative            <300    C-reactive Protein [[id number]]  (Abnormal) Collected: 12/24/24 0846    Specimen: Blood Updated: 12/24/24 0916     C-Reactive Protein 10.41 mg/dL     Procalcitonin [[id number]]  (Normal) Collected: 12/24/24 0846    Specimen: Blood Updated: 12/24/24 0925     Procalcitonin 0.19 ng/mL     Narrative:      As a Marker for Sepsis (Non-Neonates):    1. <0.5 ng/mL represents a low risk of severe sepsis and/or septic shock.  2. >2 ng/mL represents a high risk of severe sepsis and/or septic shock.    As a Marker for Lower Respiratory Tract Infections that require antibiotic therapy:    PCT on Admission    Antibiotic Therapy       6-12 Hrs later    >0.5                Strongly Recommended  >0.25 - <0.5        Recommended   0.1 - 0.25          Discouraged              Remeasure/reassess PCT  <0.1                Strongly Discouraged     Remeasure/reassess PCT    As 28 day mortality risk marker: \"Change in Procalcitonin Result\" (>80% or <=80%) if Day 0 (or Day 1) and Day 4 values are available. Refer to http://www.[employer]-pct-calculator.com    Change in PCT <=80%  A decrease of PCT levels below or equal to 80% defines a positive change in PCT test result representing a higher risk for 28-day all-cause mortality of patients diagnosed with severe sepsis for septic shock.    Change in PCT >80%  A decrease of PCT levels of more than 80% defines a negative change in PCT result representing a lower risk for 28-day all-cause mortality of patients diagnosed with severe sepsis or septic shock.       Lactic Acid, Plasma [[id number]]  (Normal) Collected: 12/24/24 0846    Specimen: Blood Updated: 12/24/24 0914     Lactate 1.1 mmol/L     Magnesium [[id number]]  (Normal) Collected: 12/24/24 0846    Specimen: Blood Updated: 12/24/24 0916 "     Magnesium 2.2 mg/dL     CBC Auto Differential [[id number]]  (Abnormal) Collected: 12/24/24 0846    Specimen: Blood Updated: 12/24/24 0853     WBC 8.32 10*3/mm3      RBC 4.24 10*6/mm3      Hemoglobin 12.4 g/dL      Hematocrit 38.0 %      MCV 89.6 fL      MCH 29.2 pg      MCHC 32.6 g/dL      RDW 13.4 %      RDW-SD 44.3 fl      MPV 9.8 fL      Platelets 241 10*3/mm3      Neutrophil % 83.2 %      Lymphocyte % 8.2 %      Monocyte % 7.6 %      Eosinophil % 0.0 %      Basophil % 0.6 %      Immature Grans % 0.4 %      Neutrophils, Absolute 6.93 10*3/mm3      Lymphocytes, Absolute 0.68 10*3/mm3      Monocytes, Absolute 0.63 10*3/mm3      Eosinophils, Absolute 0.00 10*3/mm3      Basophils, Absolute 0.05 10*3/mm3      Immature Grans, Absolute 0.03 10*3/mm3      nRBC 0.0 /100 WBC     POC Glucose Once [[id number]]  (Abnormal) Collected: 12/24/24 0846    Specimen: Blood Updated: 12/24/24 0849     Glucose 142 mg/dL      Comment: Serial Number: [id number]:  [id number]       Urinalysis With Culture If Indicated - Urine, Clean Catch [[id number]]  (Abnormal) Collected: 12/24/24 0945    Specimen: Urine, Clean Catch Updated: 12/24/24 0959     Color, UA Yellow     Appearance, UA Clear     pH, UA 5.5     Specific Gravity, UA 1.024     Glucose, UA >=1000 mg/dL (3+)     Ketones, UA 15 mg/dL (1+)     Bilirubin, UA Negative     Blood, UA Large (3+)     Protein, UA 30 mg/dL (1+)     Leuk Esterase, UA Negative     Nitrite, UA Negative     Urobilinogen, UA 0.2 E.U./dL    Narrative:      In absence of clinical symptoms, the presence of pyuria, bacteria, and/or nitrites on the urinalysis result does not correlate with infection.    High Sensitivity Troponin T 1Hr [[id number]]  (Abnormal) Collected: 12/24/24 0945    Specimen: Blood Updated: 12/24/24 1035     HS Troponin T 335 ng/L      Troponin T Numeric Delta -3 ng/L      Troponin T % Delta -1 %     Narrative:      High Sensitive Troponin T Reference Range:  <14.0 ng/L- Negative Female for  AMI  <22.0 ng/L- Negative Male for AMI  >=14 - Abnormal Female indicating possible myocardial injury.  >=22 - Abnormal Male indicating possible myocardial injury.   Clinicians would have to utilize clinical acumen, EKG, Troponin, and serial changes to determine if it is an Acute Myocardial Infarction or myocardial injury due to an underlying chronic condition.         Urinalysis, Microscopic Only - Urine, Clean Catch [[id number]] Collected: 12/24/24 0945    Specimen: Urine, Clean Catch Updated: 12/24/24 1013     RBC, UA None Seen /HPF      WBC, UA None Seen /HPF      Comment: Urine culture not indicated.        Bacteria, UA None Seen /HPF      Squamous Epithelial Cells, UA None Seen /HPF      Hyaline Casts, UA None Seen /LPF      Methodology Manual Light Microscopy             CT Head Without Contrast    Result Date: 12/24/2024  HEAD CT     12/24/2024 10:14 AM  HISTORY: Altered mental status; F03.90-Unspecified dementia, unspecified severity, without behavioral disturbance, psychotic disturbance, mood disturbance, and anxiety; T14.8XXA-Other injury of unspecified body region, initial encounter  TECHNIQUE: Multiple axial CT images were performed from the foramen magnum to the vertex. Coronal reformatted images were reconstructed from axial data set. This study was performed with techniques to keep radiation doses as low as reasonably achievable (ALARA). Individualized dose reduction techniques using automated exposure control or adjustment of mA and/or kV according to the patient size were employed. 695 mGy*cm  COMPARISON: CT head 6/18/2024.  FINDINGS: No acute intracranial hemorrhage or large acute cortical infarct. Chronic small vessel ischemic white matter changes and generalized cerebral volume loss are present. Ventricles are prominent. No midline shift. The basal cisterns are patent. Intracranial arterial atherosclerotic calcifications. The vascular structures are of somewhat increased density which may be  related to dehydration and less likely other causes of elevated hematocrit.  No skull fracture. The visualized paranasal sinuses and mastoid air cells are clear.      Impression: No acute intracranial hemorrhage or large acute cortical infarct.   CRITICAL RESULT: No.  COMMUNICATION: Per this written report.  Images personally reviewed, interpreted, and dictated by KAILEE Sanford.            This report was signed and finalized on 12/24/2024 10:28 AM by KAILEE Sanford.      XR Chest 1 View    Result Date: 12/24/2024  CLINICAL INDICATION:  Altered mental status  EXAMINATION TECHNIQUE: XR CHEST 1 VW-  COMPARISON: Radiographs 5/8/2024.  FINDINGS: Prior median sternotomy. Left chest wall subclavian approach right atrioventricular implantable cardiac defibrillator in place. Heart is normal in size. Aortic atherosclerosis. Lungs are clear. No pneumothorax or pleural effusion. Surgical suture line in the medial left lung base. Emphysema/COPD. Scattered calcified pulmonary granulomas. Skinfolds over the both hemithoraces.      Impression: No acute cardiopulmonary findings.  Images personally reviewed, interpreted and dictated by KAILEE Sanford.     This report was signed and finalized on 12/24/2024 9:28 AM by KAILEE Sanford.          MDM      Initial impression of presenting illness: Altered mental status    DDX: includes but is not limited to: Dementia, urinary tract infection, cellulitis, sepsis, intracranial hemorrhage    Patient arrives stable with vitals interpreted by myself.     Pertinent features from physical exam: Clear to auscultation, regular rate and rhythm, no murmur, nontender to abdominal palpation.    Initial diagnostic plan: CBC, CMP, troponin, BNP, EKG, chest x-ray, CRP, Pro-Aamir, lactic acid, magnesium, CT head, point-of-care glucose    Results from initial plan were reviewed and interpreted by me revealing glucose normal at the time of my assessment patient with elevated  troponin with no delta, no EKG changes, no concern for intracranial hemorrhage or sepsis on workup    Diagnostic information from other sources: Reviewed past medical records    Interventions / Re-evaluation: 7 emergency department for over 2 and half hours with no change in vital signs, patient mental status improves while in emergency department    Results/clinical rationale were discussed with patient and found members at bedside    Consultations/Discussion of results with other physicians: I discussed my concern for patient's chronic wounds with family member.  They report that patient would not want any further operations and would not want a cardiac catheterization and they would like to maximize patient's comfort.  They also would like for him to not spend as much time in the hospital.  I discussed hospice with them and they were in agreement with this.  Have contacted hospice he will follow-up with the patient in a few days.  Have informed patient's nursing facility of this change and will make arrangements with them.      Disposition plan: Discharge  -----        Final diagnoses:   Dementia, unspecified dementia severity, unspecified dementia type, unspecified whether behavioral, psychotic, or mood disturbance or anxiety   Open wound of skin          Edge, Remy Galaviz MD  12/24/24 7930

## 2024-12-24 NOTE — DISCHARGE INSTRUCTIONS
Mr. Cabezas was evaluated due to concerns for altered mental status.  We found that his wounds had leaves on them and a salve which we would recommend against.  However, we discussed his case with his family who states that he would want a be comfortable, would not want any painful interventions, and would likely not want recurrent trips to the hospital.  Given this discussed hospice with the family who would be interested in this.  We contacted hospice who will follow up with you at your nursing facility.  He is now stable for discharge.  Would continue his outpatient regimen as he is already on.  He did also have an elevation in his troponin however, family did not believe that he would be interested in a heart catheterization given the above.  He is now stable for discharge.

## 2024-12-24 NOTE — DISCHARGE PLACEMENT REQUEST
"HOSPICE REFERRAL:    Cassandra Cabezas (74 y.o. Male)       Date of Birth   1950    Social Security Number       Address   [address] [place] [address]    Home Phone   [phone]    MRN   [id number]       Episcopalian   Mennonite    Marital Status                               Admission Date   12/24/24    Admission Type   Emergency    Admitting Provider       Attending Provider   Remy Pendleton MD    Department, Room/Bed   [facility] EMERGENCY DEPARTMENT, 08/08       Discharge Date       Discharge Disposition       Discharge Destination                                 Attending Provider: Remy Pendleton MD    Allergies: Codeine    Isolation: None   Infection: None   Code Status: Prior    Ht: 170.2 cm (67\")   Wt: 67 kg (147 lb 11.3 oz)    Admission Cmt: None   Principal Problem: None                  Active Insurance as of 12/24/2024       Primary Coverage       Payor Plan Insurance Group Employer/Plan Group    [place] [id number]       Payor Plan Address Payor Plan Phone Number Payor Plan Fax Number Effective Dates    [employer] [address] [address] [phone]  6/1/2023 - None Entered    [facility] [id number]         Subscriber Name Subscriber Birth Date Member ID       CASSANDRA CABEZAS 1950 [id number]                     Emergency Contacts        (Rel.) Home Phone Work Phone Mobile Phone    RICADRO CABEZAS (Spouse) -- -- [phone]    Willard Cabezas (Son) -- -- [phone]    BALDEMAR VELA (Daughter) -- -- [phone]                 Emergency Department Notes        Randolph Guzman PCT at 12/24/24 1057       Summary:Dispatch                 Called and requested dispatch pickup this pt. They will be here shortly    Electronically signed by Randolph Guzman PCT at 12/24/24 1057       Remy Pendleton MD at 12/24/24 6154          Subjective:  History of Present Illness:    Patient is a 74-year-old male with history of CAD, " diabetes mellitus, hypertension, Parkinson's disease, chronic wounds to the finger as well as the bilateral legs currently on outpatient antibiotics who presents back today with concerns for altered mental status.  Had been seen in our emergency department yesterday due to concerns for altered mental status with hypoglycemia.  Have been evaluated in our emergency department for this and was ultimately discharged.  Patient apparently has history of transient alteration of awareness and history of dementia.  He now presents back this morning due to concerns for altered mental status.  Patient's chronic wounds appear baseline per nurses familiar yesterday.  No fevers reported.  Patient's glucose is normal.  He is following commands at the bedside.      Nurses Notes reviewed and agree, including vitals, allergies, social history and prior medical history.     REVIEW OF SYSTEMS: All systems reviewed and not pertinent unless noted.  Review of Systems   Unable to perform ROS: Dementia       Past Medical History:   Diagnosis Date    Arthritis     Coronary artery disease involving coronary bypass graft of native heart without angina pectoris 01/01/2016     bypass x3 in January 2012 at [place] (LIMA to the left anterior descending, SVG to right coronary artery, SVG to OM), a stent to the right coronary artery in January 2016    Diabetes     Heart attack     High blood pressure     Impaired mobility     Parkinson disease        Allergies:    Codeine      Past Surgical History:   Procedure Laterality Date    ARTERIAL BYPASS SURGERY      BACK SURGERY      CARDIAC SURGERY      CAROTID STENT      HIP SURGERY      INSERT / REPLACE / REMOVE PACEMAKER      PACEMAKER IMPLANTATION           Social History     Socioeconomic History    Marital status:    Tobacco Use    Smoking status: Never    Smokeless tobacco: Never   Vaping Use    Vaping status: Never Used   Substance and Sexual Activity    Alcohol use: Never    Drug use:  "Never    Sexual activity: Defer         Family History   Problem Relation Age of Onset    Parkinsonism Other     Diabetes Other     Other Mother         mitral valve replacement       Objective  Physical Exam:  /68   Pulse 97   Temp 98.2 °F (36.8 °C) (Oral)   Resp 18   Ht 170.2 cm (67\")   Wt 67 kg (147 lb 11.3 oz)   SpO2 99%   BMI 23.13 kg/m²      Physical Exam  Constitutional:       General: He is not in acute distress.     Appearance: Normal appearance. He is normal weight. He is ill-appearing. He is not toxic-appearing or diaphoretic.   HENT:      Head: Normocephalic and atraumatic.      Nose: Nose normal. No congestion or rhinorrhea.      Mouth/Throat:      Mouth: Mucous membranes are moist.      Pharynx: Oropharynx is clear.   Eyes:      Extraocular Movements: Extraocular movements intact.      Conjunctiva/sclera: Conjunctivae normal.      Pupils: Pupils are equal, round, and reactive to light.   Cardiovascular:      Rate and Rhythm: Normal rate and regular rhythm.      Pulses: Normal pulses.   Pulmonary:      Effort: Pulmonary effort is normal. No respiratory distress.      Breath sounds: Normal breath sounds.   Abdominal:      General: Abdomen is flat. Bowel sounds are normal. There is no distension.      Palpations: Abdomen is soft.      Tenderness: There is no abdominal tenderness.   Musculoskeletal:         General: No swelling or tenderness. Normal range of motion.      Cervical back: Normal range of motion and neck supple. No rigidity or tenderness.      Comments: Patient with chronic wound to the right hand, ischemia noted with no overlying cellulitis, chronic wound, patient also with wounds to bilateral lower extremities.  Both wounds appear to have leaves that a been placed on them as well as an unknown salve over them.  No obvious overlying cellulitis   Skin:     General: Skin is warm and dry.      Capillary Refill: Capillary refill takes less than 2 seconds.   Neurological:      " General: No focal deficit present.      Mental Status: He is alert. Mental status is at baseline. He is confused.      Cranial Nerves: No cranial nerve deficit.      Sensory: No sensory deficit.      Motor: No weakness.      Comments: ANO x 2 at this time, following commands, no focal weakness   Psychiatric:         Mood and Affect: Mood normal.         Behavior: Behavior normal.         Thought Content: Thought content normal.         Judgment: Judgment normal.         Procedures    ED Course:    ED Course as of 12/24/24 1107   Tue Dec 24, 2024   0844 EKG interpreted by me, normal sinus rhythm with no concerning ST changes noted, left axis deviation, rate of 96 [JE]   0928 Chest x-ray independently interpreted by me showing no acute process [JE]   1042 I went to bedside to discuss patient's workup today and goals of care.  Family at bedside would prefer for patient to be at his assisted living facility and to be made as comfortable as possible without recurrent visits to the hospital.  They do not believe patient's would want any further painful interventions or surgery on his legs.  They also declined any further workup for patient's troponin elevation.  States that he would not want a heart catheterization.  Given this, discussed hospice with family who is receptive to this.  Contacted hospice who will follow-up with patient in a few days.  In the meantime, we will send patient back to his skilled nursing facility. [JE]      ED Course User Index  [JE] Remy Pendleton MD       Lab Results (last 24 hours)       Procedure Component Value Units Date/Time    POC Glucose Finger Q1H [[id number]]  (Normal) Collected: 12/23/24 1345    Specimen: Blood from Finger Updated: 12/23/24 1347     Glucose 104 mg/dL      Comment: Serial Number: [id number]:  [id number]       POC Glucose Once [[id number]]  (Normal) Collected: 12/23/24 1440    Specimen: Blood Updated: 12/23/24 1443     Glucose 113 mg/dL      Comment: Serial  Number: [id number]:  [id number]       COVID-19 and FLU A/B PCR, 1 HR TAT - Swab, Nasopharynx [[id number]]  (Normal) Collected: 12/24/24 0835    Specimen: Swab from Nasopharynx Updated: 12/24/24 0912     COVID19 Not Detected     Influenza A PCR Not Detected     Influenza B PCR Not Detected    Narrative:      Fact sheet for providers: https://www.fda.gov/media/584795/download    Fact sheet for patients: https://www.fda.gov/media/149909/download    Test performed by PCR.    CBC & Differential [[id number]]  (Abnormal) Collected: 12/24/24 0846    Specimen: Blood Updated: 12/24/24 0853    Narrative:      The following orders were created for panel order CBC & Differential.  Procedure                               Abnormality         Status                     ---------                               -----------         ------                     CBC Auto Differential[[id number]]        Abnormal            Final result                 Please view results for these tests on the individual orders.    Comprehensive Metabolic Panel [[id number]]  (Abnormal) Collected: 12/24/24 0846    Specimen: Blood Updated: 12/24/24 0916     Glucose 151 mg/dL      BUN 21 mg/dL      Creatinine 1.20 mg/dL      Sodium 139 mmol/L      Potassium 5.0 mmol/L      Chloride 101 mmol/L      CO2 22.8 mmol/L      Calcium 9.4 mg/dL      Total Protein 7.4 g/dL      Albumin 3.7 g/dL      ALT (SGPT) 68 U/L      AST (SGOT) 105 U/L      Alkaline Phosphatase 169 U/L      Total Bilirubin 0.5 mg/dL      Globulin 3.7 gm/dL      A/G Ratio 1.0 g/dL      BUN/Creatinine Ratio 17.5     Anion Gap 15.2 mmol/L      eGFR 63.5 mL/min/1.73     Narrative:      GFR Categories in Chronic Kidney Disease (CKD)      GFR Category          GFR (mL/min/1.73)    Interpretation  G1                     90 or greater         Normal or high (1)  G2                      60-89                Mild decrease (1)  G3a                   45-59                Mild to moderate decrease  G3b                    30-44                Moderate to severe decrease  G4                    15-29                Severe decrease  G5                    14 or less           Kidney failure          (1)In the absence of evidence of kidney disease, neither GFR category G1 or G2 fulfill the criteria for CKD.    eGFR calculation 2021 CKD-EPI creatinine equation, which does not include race as a factor    Lipase [[id number]]  (Abnormal) Collected: 12/24/24 0846    Specimen: Blood Updated: 12/24/24 0916     Lipase 8 U/L     High Sensitivity Troponin T [[id number]]  (Abnormal) Collected: 12/24/24 0846    Specimen: Blood Updated: 12/24/24 0934     HS Troponin T 338 ng/L     Narrative:      High Sensitive Troponin T Reference Range:  <14.0 ng/L- Negative Female for AMI  <22.0 ng/L- Negative Male for AMI  >=14 - Abnormal Female indicating possible myocardial injury.  >=22 - Abnormal Male indicating possible myocardial injury.   Clinicians would have to utilize clinical acumen, EKG, Troponin, and serial changes to determine if it is an Acute Myocardial Infarction or myocardial injury due to an underlying chronic condition.         BNP [[id number]]  (Abnormal) Collected: 12/24/24 0846    Specimen: Blood Updated: 12/24/24 0921     proBNP 1,333.0 pg/mL     Narrative:      This assay is used as an aid in the diagnosis of individuals suspected of having heart failure. It can be used as an aid in the diagnosis of acute decompensated heart failure (ADHF) in patients presenting with signs and symptoms of ADHF to the emergency department (ED). In addition, NT-proBNP of <300 pg/mL indicates ADHF is not likely.    Age Range Result Interpretation  NT-proBNP Concentration (pg/mL:      <50             Positive            >450                   Gray                 300-450                    Negative             <300    50-75           Positive            >900                  Gray                300-900                  Negative            <300      >75    "          Positive            >1800                  Gray                300-1800                  Negative            <300    C-reactive Protein [[id number]]  (Abnormal) Collected: 12/24/24 0846    Specimen: Blood Updated: 12/24/24 0916     C-Reactive Protein 10.41 mg/dL     Procalcitonin [[id number]]  (Normal) Collected: 12/24/24 0846    Specimen: Blood Updated: 12/24/24 0925     Procalcitonin 0.19 ng/mL     Narrative:      As a Marker for Sepsis (Non-Neonates):    1. <0.5 ng/mL represents a low risk of severe sepsis and/or septic shock.  2. >2 ng/mL represents a high risk of severe sepsis and/or septic shock.    As a Marker for Lower Respiratory Tract Infections that require antibiotic therapy:    PCT on Admission    Antibiotic Therapy       6-12 Hrs later    >0.5                Strongly Recommended  >0.25 - <0.5        Recommended   0.1 - 0.25          Discouraged              Remeasure/reassess PCT  <0.1                Strongly Discouraged     Remeasure/reassess PCT    As 28 day mortality risk marker: \"Change in Procalcitonin Result\" (>80% or <=80%) if Day 0 (or Day 1) and Day 4 values are available. Refer to http://www.[facility]-pct-calculator.com    Change in PCT <=80%  A decrease of PCT levels below or equal to 80% defines a positive change in PCT test result representing a higher risk for 28-day all-cause mortality of patients diagnosed with severe sepsis for septic shock.    Change in PCT >80%  A decrease of PCT levels of more than 80% defines a negative change in PCT result representing a lower risk for 28-day all-cause mortality of patients diagnosed with severe sepsis or septic shock.       Lactic Acid, Plasma [[id number]]  (Normal) Collected: 12/24/24 0846    Specimen: Blood Updated: 12/24/24 0914     Lactate 1.1 mmol/L     Magnesium [[id number]]  (Normal) Collected: 12/24/24 0846    Specimen: Blood Updated: 12/24/24 0916     Magnesium 2.2 mg/dL     CBC Auto Differential [[id number]]  (Abnormal) Collected: " 12/24/24 0846    Specimen: Blood Updated: 12/24/24 0853     WBC 8.32 10*3/mm3      RBC 4.24 10*6/mm3      Hemoglobin 12.4 g/dL      Hematocrit 38.0 %      MCV 89.6 fL      MCH 29.2 pg      MCHC 32.6 g/dL      RDW 13.4 %      RDW-SD 44.3 fl      MPV 9.8 fL      Platelets 241 10*3/mm3      Neutrophil % 83.2 %      Lymphocyte % 8.2 %      Monocyte % 7.6 %      Eosinophil % 0.0 %      Basophil % 0.6 %      Immature Grans % 0.4 %      Neutrophils, Absolute 6.93 10*3/mm3      Lymphocytes, Absolute 0.68 10*3/mm3      Monocytes, Absolute 0.63 10*3/mm3      Eosinophils, Absolute 0.00 10*3/mm3      Basophils, Absolute 0.05 10*3/mm3      Immature Grans, Absolute 0.03 10*3/mm3      nRBC 0.0 /100 WBC     POC Glucose Once [[id number]]  (Abnormal) Collected: 12/24/24 0846    Specimen: Blood Updated: 12/24/24 0849     Glucose 142 mg/dL      Comment: Serial Number: [id number]:  [id number]       Urinalysis With Culture If Indicated - Urine, Clean Catch [[id number]]  (Abnormal) Collected: 12/24/24 0945    Specimen: Urine, Clean Catch Updated: 12/24/24 0959     Color, UA Yellow     Appearance, UA Clear     pH, UA 5.5     Specific Gravity, UA 1.024     Glucose, UA >=1000 mg/dL (3+)     Ketones, UA 15 mg/dL (1+)     Bilirubin, UA Negative     Blood, UA Large (3+)     Protein, UA 30 mg/dL (1+)     Leuk Esterase, UA Negative     Nitrite, UA Negative     Urobilinogen, UA 0.2 E.U./dL    Narrative:      In absence of clinical symptoms, the presence of pyuria, bacteria, and/or nitrites on the urinalysis result does not correlate with infection.    High Sensitivity Troponin T 1Hr [[id number]]  (Abnormal) Collected: 12/24/24 0945    Specimen: Blood Updated: 12/24/24 1035     HS Troponin T 335 ng/L      Troponin T Numeric Delta -3 ng/L      Troponin T % Delta -1 %     Narrative:      High Sensitive Troponin T Reference Range:  <14.0 ng/L- Negative Female for AMI  <22.0 ng/L- Negative Male for AMI  >=14 - Abnormal Female indicating possible  myocardial injury.  >=22 - Abnormal Male indicating possible myocardial injury.   Clinicians would have to utilize clinical acumen, EKG, Troponin, and serial changes to determine if it is an Acute Myocardial Infarction or myocardial injury due to an underlying chronic condition.         Urinalysis, Microscopic Only - Urine, Clean Catch [[id number]] Collected: 12/24/24 0945    Specimen: Urine, Clean Catch Updated: 12/24/24 1013     RBC, UA None Seen /HPF      WBC, UA None Seen /HPF      Comment: Urine culture not indicated.        Bacteria, UA None Seen /HPF      Squamous Epithelial Cells, UA None Seen /HPF      Hyaline Casts, UA None Seen /LPF      Methodology Manual Light Microscopy             CT Head Without Contrast    Result Date: 12/24/2024  HEAD CT     12/24/2024 10:14 AM  HISTORY: Altered mental status; F03.90-Unspecified dementia, unspecified severity, without behavioral disturbance, psychotic disturbance, mood disturbance, and anxiety; T14.8XXA-Other injury of unspecified body region, initial encounter  TECHNIQUE: Multiple axial CT images were performed from the foramen magnum to the vertex. Coronal reformatted images were reconstructed from axial data set. This study was performed with techniques to keep radiation doses as low as reasonably achievable (ALARA). Individualized dose reduction techniques using automated exposure control or adjustment of mA and/or kV according to the patient size were employed. 695 mGy*cm  COMPARISON: CT head 6/18/2024.  FINDINGS: No acute intracranial hemorrhage or large acute cortical infarct. Chronic small vessel ischemic white matter changes and generalized cerebral volume loss are present. Ventricles are prominent. No midline shift. The basal cisterns are patent. Intracranial arterial atherosclerotic calcifications. The vascular structures are of somewhat increased density which may be related to dehydration and less likely other causes of elevated hematocrit.  No skull  fracture. The visualized paranasal sinuses and mastoid air cells are clear.      Impression: No acute intracranial hemorrhage or large acute cortical infarct.   CRITICAL RESULT: No.  COMMUNICATION: Per this written report.  Images personally reviewed, interpreted, and dictated by KAILEE Sanford.            This report was signed and finalized on 12/24/2024 10:28 AM by KAILEE Sanford.      XR Chest 1 View    Result Date: 12/24/2024  CLINICAL INDICATION:  Altered mental status  EXAMINATION TECHNIQUE: XR CHEST 1 VW-  COMPARISON: Radiographs 5/8/2024.  FINDINGS: Prior median sternotomy. Left chest wall subclavian approach right atrioventricular implantable cardiac defibrillator in place. Heart is normal in size. Aortic atherosclerosis. Lungs are clear. No pneumothorax or pleural effusion. Surgical suture line in the medial left lung base. Emphysema/COPD. Scattered calcified pulmonary granulomas. Skinfolds over the both hemithoraces.      Impression: No acute cardiopulmonary findings.  Images personally reviewed, interpreted and dictated by KAILEE Sanford.     This report was signed and finalized on 12/24/2024 9:28 AM by KAILEE Sanford.          MDM      Initial impression of presenting illness: Altered mental status    DDX: includes but is not limited to: Dementia, urinary tract infection, cellulitis, sepsis, intracranial hemorrhage    Patient arrives stable with vitals interpreted by myself.     Pertinent features from physical exam: Clear to auscultation, regular rate and rhythm, no murmur, nontender to abdominal palpation.    Initial diagnostic plan: CBC, CMP, troponin, BNP, EKG, chest x-ray, CRP, Pro-Aamir, lactic acid, magnesium, CT head, point-of-care glucose    Results from initial plan were reviewed and interpreted by me revealing glucose normal at the time of my assessment patient with elevated troponin with no delta, no EKG changes, no concern for intracranial hemorrhage or sepsis on  workup    Diagnostic information from other sources: Reviewed past medical records    Interventions / Re-evaluation: 7 emergency department for over 2 and half hours with no change in vital signs, patient mental status improves while in emergency department    Results/clinical rationale were discussed with patient and found members at bedside    Consultations/Discussion of results with other physicians: I discussed my concern for patient's chronic wounds with family member.  They report that patient would not want any further operations and would not want a cardiac catheterization and they would like to maximize patient's comfort.  They also would like for him to not spend as much time in the hospital.  I discussed hospice with them and they were in agreement with this.  Have contacted hospice he will follow-up with the patient in a few days.  Have informed patient's nursing facility of this change and will make arrangements with them.      Disposition plan: Discharge  -----        Final diagnoses:   Dementia, unspecified dementia severity, unspecified dementia type, unspecified whether behavioral, psychotic, or mood disturbance or anxiety   Open wound of skin          Remy Pendleton MD  12/24/24 1107      Electronically signed by Remy Pendleton MD at 12/24/24 1107       No current facility-administered medications for this encounter.     Current Outpatient Medications   Medication Sig Dispense Refill    amantadine (SYMMETREL) 100 MG capsule Take 1 capsule by mouth 2 (Two) Times a Day.      clopidogrel (PLAVIX) 75 MG tablet Take 1 tablet by mouth Daily.      dapagliflozin Propanediol (Farxiga) 10 MG tablet Take  by mouth.      dextrose (GLUTOSE) 40 % gel Take 15 g by mouth Every 15 (Fifteen) Minutes As Needed for Low Blood Sugar (Blood sugar less than 70). 60 g     hydrOXYzine (ATARAX) 10 MG tablet Take 1 tablet by mouth 2 (Two) Times a Day.      insulin detemir (LEVEMIR) 100 UNIT/ML injection Inject 10  Units under the skin into the appropriate area as directed 2 (Two) Times a Day. Titrate up for continued hyperglycemia.      latanoprost (XALATAN) 0.005 % ophthalmic solution Administer 1 drop to both eyes Every Night.      lisinopril (PRINIVIL,ZESTRIL) 2.5 MG tablet Take 1 tablet by mouth Daily.      mirtazapine (REMERON SOL-TAB) 30 MG disintegrating tablet Place 1 tablet on the tongue Every Night.      polyethylene glycol (MiraLax) 17 g packet Take 17 g by mouth Daily.      pramipexole (MIRAPEX) 0.5 MG tablet Take 1 tablet by mouth 3 (Three) Times a Day.      QUEtiapine (SEROquel) 100 MG tablet Take 1.5 tablets by mouth Every Night.      rosuvastatin (CRESTOR) 5 MG tablet Take 1 tablet by mouth Daily. (Patient taking differently: Take 4 tablets by mouth Daily.) 30 tablet 11    tamsulosin (FLOMAX) 0.4 MG capsule 24 hr capsule Take 1 capsule by mouth Daily.      traMADol (ULTRAM) 50 MG tablet 1 tablet.      vitamin D3 125 MCG (5000 UT) capsule capsule Take 1 capsule by mouth Daily.       Ambulatory Referral to Home Hospice [[id number]] (Order [id number])  Order  Date: 12/24/2024 Department: [facility] EMERGENCY DEPARTMENT Ordering/Authorizing: Remy Pendleton MD     Order History  Outpatient  Date/Time Action Taken User Additional Information   12/24/24 1018 Sign Remy Pendleton MD      Order Details    Frequency Duration Priority Order Class   None None Routine Internal Referral     Start Date/Time    Start Date   12/24/24     Order Information    Order Date Service Start Date Start Time   12/24/24 Emergency Medicine 12/24/24      Comments    Please evaluate Imtiaz Cabezas for admission to Hospice.    Patient/Family aware of referral: yes    Services to provide: Wound/Ostomy Care and Pain and Symptom management    Physician to follow patient's care (the person listed here will be responsible for signing ongoing orders): PCP    Referring Provider Call-back Phone Number:    Requested Start of Care  Date: Within 2-3 days    Special Instructions: Lives in assisted living facility            Process Instructions    JENNY,LAG, AUBREY only: Please fax Order and Patient Demographics to [phone]. If you have any questions or concerns about this referral please call [phone], option 2.       Reference Links    Associated Reports   View Encounter           Scheduling Instructions    JENNY,LAG, AUBREY only: Please fax Order and Patient Demographics to [phone]. If you have any questions or concerns about this referral please call [phone], option 2.                  Source Order Set / Preference List    Preference List   [facility] FACILITY REFERRALS [[id number]]           Clinical Indications     ICD-10-CM ICD-9-CM   Dementia, unspecified dementia severity, unspecified dementia type, unspecified whether behavioral, psychotic, or mood disturbance or anxiety  - Primary    F03.90 294.20   Open wound of skin    T14.8XXA 879.8                             Reprint Order Requisition    Ambulatory Referral to Home Hospice (Order #[id number]) on 12/24/24         Encounter    View Encounter                Order Provider Info        Office phone Pager E-mail   Ordering User  Remy Pendleton MD  [phone] -- --   Authorizing Provider  Remy Pendleton MD  [phone] -- --   Attending Provider  Remy Pendleton MD  [phone] -- --     Tracking Reports    Cosign Tracking Order Transmittal Tracking     Authorized by:  Remy Pendleton MD  (NPI: [id number])                Lab Component SmartPhrase Guide    Ambulatory Referral to Home Hospice (Order #[id number]) on 12/24/24

## 2024-12-24 NOTE — CASE MANAGEMENT/SOCIAL WORK
Case Management/Social Work    Patient Name:  Imtiaz Cabezas  YOB: 1950  MRN: [id number]  Admit Date:  12/24/2024    0910:  Patient awake and talkative with .  He is alert and joking.  Wife is at bedside.  Initiated conversation about the patient's current physical condition and inquired about possibility that the patient needs more assistance than what assisted living can offer.  The patient's wife asked what those options would be.  I mentioned skilled nursing due to his wounds, and that would mean the patient would go to a nursing home.  The patient's wife said she has never wanted the patient to go to a nursing home but did ask about the possibility for Hospice to come see the patient at his Assisted Living facility.  I told her that would absolutely be an option.  This  informed the patient and his wife that I will make some calls and will return to them with additional information.  Dr. Pendleton is aware of this conversation.    0935:  Called [facility] line ([phone]).  Spoke with call center employee who took my contact number and the patient's name.  The employee said he will send the information to the on-call Hospice staff to return my call.    1000:  Received return call from Reji at [facility].  She informed me that it will be a couple days before Hospice can visit with the patient since they will have to talk to the DON at [facility].  She states that Hospice also has other patient's at [facility] and they would love to  the patient.  She asked to have the patient's referral sent to her e-fax # [phone].  Case Management/Social Work will send referral when labs/scan/x-rays are complete.      1020:  Spoke with patient's wife at bedside.  She is agreeable for the patient's information to be sent to [facility].  Also provided the patient's wife with a [facility] Resource Guide, pages 76-77 for information about Hospice and  the contact number for [facility].  Additionally provided a list of extra benefits available through the patient's [employer] plan.  The patient's wife is appreciative and accepting of the resources.  Dr. Pendleton is aware of the above interaction with the patient and his wife.    1045:  Spoke with Nidhi, , at [facility].  She is aware the patient will return to the facility with referral for Hospice services.  She is also in agreement that the patient is in need of Hospice care.    1116:  E-fax sent at this time to [phone].    Electronically signed by:  Donna Berg RN  12/24/24 09:30 EST